# Patient Record
Sex: MALE | Race: BLACK OR AFRICAN AMERICAN | Employment: OTHER | URBAN - METROPOLITAN AREA
[De-identification: names, ages, dates, MRNs, and addresses within clinical notes are randomized per-mention and may not be internally consistent; named-entity substitution may affect disease eponyms.]

---

## 2017-05-26 ENCOUNTER — HOSPITAL ENCOUNTER (EMERGENCY)
Age: 67
Discharge: HOME | End: 2017-05-26
Attending: EMERGENCY MEDICINE
Payer: MEDICARE

## 2017-05-26 VITALS
TEMPERATURE: 97.9 F | SYSTOLIC BLOOD PRESSURE: 147 MMHG | DIASTOLIC BLOOD PRESSURE: 95 MMHG | RESPIRATION RATE: 18 BRPM | OXYGEN SATURATION: 98 % | HEART RATE: 81 BPM

## 2017-05-26 DIAGNOSIS — M25.511 PAIN OF BOTH SHOULDER JOINTS: Primary | ICD-10-CM

## 2017-05-26 DIAGNOSIS — F10.920 ALCOHOL INTOXICATION, UNCOMPLICATED (HCC): ICD-10-CM

## 2017-05-26 DIAGNOSIS — M25.512 PAIN OF BOTH SHOULDER JOINTS: Primary | ICD-10-CM

## 2017-05-26 LAB
ALBUMIN SERPL BCP-MCNC: 3.6 G/DL (ref 3.2–4.6)
ALBUMIN/GLOB SERPL: 0.9 {RATIO} (ref 1.2–3.5)
ALP SERPL-CCNC: 101 U/L (ref 50–136)
ALT SERPL-CCNC: 26 U/L (ref 12–65)
ANION GAP BLD CALC-SCNC: 9 MMOL/L (ref 7–16)
AST SERPL W P-5'-P-CCNC: 21 U/L (ref 15–37)
ATRIAL RATE: 87 BPM
BACTERIA URNS QL MICRO: 0 /HPF
BASOPHILS # BLD AUTO: 0 K/UL (ref 0–0.2)
BASOPHILS # BLD: 0 % (ref 0–2)
BILIRUB SERPL-MCNC: 0.6 MG/DL (ref 0.2–1.1)
BUN SERPL-MCNC: 15 MG/DL (ref 8–23)
CALCIUM SERPL-MCNC: 9 MG/DL (ref 8.3–10.4)
CALCULATED P AXIS, ECG09: 50 DEGREES
CALCULATED R AXIS, ECG10: 47 DEGREES
CALCULATED T AXIS, ECG11: 21 DEGREES
CASTS URNS QL MICRO: 0 /LPF
CHLORIDE SERPL-SCNC: 111 MMOL/L (ref 98–107)
CO2 SERPL-SCNC: 25 MMOL/L (ref 21–32)
CREAT SERPL-MCNC: 1.46 MG/DL (ref 0.8–1.5)
DIAGNOSIS, 93000: NORMAL
DIFFERENTIAL METHOD BLD: ABNORMAL
EOSINOPHIL # BLD: 0 K/UL (ref 0–0.8)
EOSINOPHIL NFR BLD: 1 % (ref 0.5–7.8)
EPI CELLS #/AREA URNS HPF: 0 /HPF
ERYTHROCYTE [DISTWIDTH] IN BLOOD BY AUTOMATED COUNT: 13.7 % (ref 11.9–14.6)
ETHANOL SERPL-MCNC: 263 MG/DL
GLOBULIN SER CALC-MCNC: 4 G/DL (ref 2.3–3.5)
GLUCOSE SERPL-MCNC: 107 MG/DL (ref 65–100)
HCT VFR BLD AUTO: 43.2 % (ref 41.1–50.3)
HGB BLD-MCNC: 15.7 G/DL (ref 13.6–17.2)
IMM GRANULOCYTES # BLD: 0 K/UL (ref 0–0.5)
IMM GRANULOCYTES NFR BLD AUTO: 0.2 % (ref 0–5)
LACTATE BLD-SCNC: 2 MMOL/L (ref 0.5–1.9)
LYMPHOCYTES # BLD AUTO: 43 % (ref 13–44)
LYMPHOCYTES # BLD: 2 K/UL (ref 0.5–4.6)
MCH RBC QN AUTO: 33.1 PG (ref 26.1–32.9)
MCHC RBC AUTO-ENTMCNC: 36.3 G/DL (ref 31.4–35)
MCV RBC AUTO: 91.1 FL (ref 79.6–97.8)
MONOCYTES # BLD: 0.3 K/UL (ref 0.1–1.3)
MONOCYTES NFR BLD AUTO: 6 % (ref 4–12)
NEUTS SEG # BLD: 2.3 K/UL (ref 1.7–8.2)
NEUTS SEG NFR BLD AUTO: 50 % (ref 43–78)
P-R INTERVAL, ECG05: 186 MS
PLATELET # BLD AUTO: 159 K/UL (ref 150–450)
PMV BLD AUTO: 10.3 FL (ref 10.8–14.1)
POTASSIUM SERPL-SCNC: 3.2 MMOL/L (ref 3.5–5.1)
PROT SERPL-MCNC: 7.6 G/DL (ref 6.3–8.2)
Q-T INTERVAL, ECG07: 356 MS
QRS DURATION, ECG06: 118 MS
QTC CALCULATION (BEZET), ECG08: 428 MS
RBC # BLD AUTO: 4.74 M/UL (ref 4.23–5.67)
RBC #/AREA URNS HPF: NORMAL /HPF
SODIUM SERPL-SCNC: 145 MMOL/L (ref 136–145)
TROPONIN I SERPL-MCNC: <0.02 NG/ML (ref 0.02–0.05)
VENTRICULAR RATE, ECG03: 87 BPM
WBC # BLD AUTO: 4.6 K/UL (ref 4.3–11.1)
WBC URNS QL MICRO: 0 /HPF

## 2017-05-26 PROCEDURE — 99285 EMERGENCY DEPT VISIT HI MDM: CPT | Performed by: EMERGENCY MEDICINE

## 2017-05-26 PROCEDURE — 81015 MICROSCOPIC EXAM OF URINE: CPT | Performed by: EMERGENCY MEDICINE

## 2017-05-26 PROCEDURE — 80053 COMPREHEN METABOLIC PANEL: CPT | Performed by: EMERGENCY MEDICINE

## 2017-05-26 PROCEDURE — 80307 DRUG TEST PRSMV CHEM ANLYZR: CPT | Performed by: EMERGENCY MEDICINE

## 2017-05-26 PROCEDURE — 84484 ASSAY OF TROPONIN QUANT: CPT | Performed by: EMERGENCY MEDICINE

## 2017-05-26 PROCEDURE — 81003 URINALYSIS AUTO W/O SCOPE: CPT | Performed by: EMERGENCY MEDICINE

## 2017-05-26 PROCEDURE — 93005 ELECTROCARDIOGRAM TRACING: CPT | Performed by: EMERGENCY MEDICINE

## 2017-05-26 PROCEDURE — 85025 COMPLETE CBC W/AUTO DIFF WBC: CPT | Performed by: EMERGENCY MEDICINE

## 2017-05-26 PROCEDURE — 83605 ASSAY OF LACTIC ACID: CPT

## 2017-05-26 RX ORDER — TRAMADOL HYDROCHLORIDE 50 MG/1
50 TABLET ORAL
Qty: 12 TAB | Refills: 0 | Status: SHIPPED | OUTPATIENT
Start: 2017-05-26 | End: 2018-11-16

## 2017-05-26 NOTE — ED NOTES
\"My shoulder has been hurting for a week but I was scared to come up here. 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 I quit hurting in this arm on this side and then the other one stared hurting\"

## 2017-05-26 NOTE — ED TRIAGE NOTES
PT arrived of ED c/o shoulder pain for the past week. Per EMS PT's girlfriend states that PT has been acting strange for the past 4 days. Pt admits to drinking ETOH. PT flows commands. PT is alert and oriented.

## 2017-05-26 NOTE — DISCHARGE INSTRUCTIONS
Acute Alcohol Intoxication: Care Instructions  Your Care Instructions  You have had treatment to help your body rid itself of alcohol. Too much alcohol upsets the body's fluid balance. Your doctor may have given you fluids and vitamins. For some people, drinking too much alcohol is a one-time event. For others, it is an ongoing problem. In either case, it is serious. It can be life-threatening. Follow-up care is a key part of your treatment and safety. Be sure to make and go to all appointments, and call your doctor if you are having problems. It's also a good idea to know your test results and keep a list of the medicines you take. How can you care for yourself at home? · Be safe with medicines. Take your medicines exactly as prescribed. Call your doctor if you think you are having a problem with your medicine. · Your doctor may have prescribed disulfiram (Antabuse). Do not drink any alcohol while you are taking this medicine. You may have severe or even life-threatening side effects from even small amounts of alcohol. · If you were given medicine to prevent nausea, be sure to take it exactly as prescribed. · Before you take any medicine, tell your doctor if:  ¨ You have had a bad reaction to any medicines in the past.  ¨ You are taking other medicines, including over-the-counter ones, or have other health problems. ¨ You are or could be pregnant. · Be prepared to have some symptoms of withdrawal in the next few days. · Drink plenty of liquids in the next few days. · Seek help if you need it to stop drinking. Getting counseling and joining a support group can help you stay sober. Try a support group such as Alcoholics Anonymous. · Avoid alcohol when you take medicines. It can react with many medicines and cause serious problems. When should you call for help? Call 911 anytime you think you may need emergency care.  For example, call if:  · You feel confused and are seeing things that are not there.  · You are thinking about killing yourself or hurting others. · You have a seizure. · You vomit blood or what looks like coffee grounds. Call your doctor now or seek immediate medical care if:  · You have trembling, restlessness, sweating, and other withdrawal symptoms that are new or that get worse. · Your withdrawal symptoms come back after not bothering you for days or weeks. · You can't stop vomiting. Watch closely for changes in your health, and be sure to contact your doctor if:  · You need help to stop drinking. Where can you learn more? Go to http://keonZila Networksgray.info/. Enter T102 in the search box to learn more about \"Acute Alcohol Intoxication: Care Instructions. \"  Current as of: November 3, 2016  Content Version: 11.2  © 1726-6461 [employer]. Care instructions adapted under license by [employer] (which disclaims liability or warranty for this information). If you have questions about a medical condition or this instruction, always ask your healthcare professional. [place] [address] any warranty or liability for your use of this information. Shoulder Pain: Care Instructions  Your Care Instructions    You can hurt your shoulder by using it too much during an activity, such as fishing or baseball. It can also happen as part of the everyday wear and tear of getting older. Shoulder injuries can be slow to heal, but your shoulder should get better with time. Your doctor may recommend a sling to rest your shoulder. If you have injured your shoulder, you may need testing and treatment. Follow-up care is a key part of your treatment and safety. Be sure to make and go to all appointments, and call your doctor if you are having problems. It's also a good idea to know your test results and keep a list of the medicines you take. How can you care for yourself at home? · Take pain medicines exactly as directed.   ¨ If the doctor gave you a prescription medicine for pain, take it as prescribed. ¨ If you are not taking a prescription pain medicine, ask your doctor if you can take an over-the-counter medicine. ¨ Do not take two or more pain medicines at the same time unless the doctor told you to. Many pain medicines contain acetaminophen, which is Tylenol. Too much acetaminophen (Tylenol) can be harmful. · If your doctor recommends that you wear a sling, use it as directed. Do not take it off before your doctor tells you to. · Put ice or a cold pack on the sore area for 10 to 20 minutes at a time. Put a thin cloth between the ice and your skin. · If there is no swelling, you can put moist heat, a heating pad, or a warm cloth on your shoulder. Some doctors suggest alternating between hot and cold. · Rest your shoulder for a few days. If your doctor recommends it, you can then begin gentle exercise of the shoulder, but do not lift anything heavy. When should you call for help? Call 911 anytime you think you may need emergency care. For example, call if:  · You have chest pain or pressure. This may occur with:  ¨ Sweating. ¨ Shortness of breath. ¨ Nausea or vomiting. ¨ Pain that spreads from the chest to the neck, jaw, or one or both shoulders or arms. ¨ Dizziness or lightheadedness. ¨ A fast or uneven pulse. After calling 911, chew 1 adult-strength aspirin. Wait for an ambulance. Do not try to drive yourself. · Your arm or hand is cool or pale or changes color. Call your doctor now or seek immediate medical care if:  · You have signs of infection, such as:  ¨ Increased pain, swelling, warmth, or redness in your shoulder. ¨ Red streaks leading from a place on your shoulder. ¨ Pus draining from an area of your shoulder. ¨ Swollen lymph nodes in your neck, armpits, or groin. ¨ A fever. Watch closely for changes in your health, and be sure to contact your doctor if:  · You cannot use your shoulder.   · Your shoulder does not get better as expected. Where can you learn more? Go to http://keon-gray.info/. Enter U025 in the search box to learn more about \"Shoulder Pain: Care Instructions. \"  Current as of: May 23, 2016  Content Version: 11.2  © 3334-7733 [employer]. Care instructions adapted under license by [employer] (which disclaims liability or warranty for this information). If you have questions about a medical condition or this instruction, always ask your healthcare professional. [address] any warranty or liability for your use of this information.

## 2017-05-26 NOTE — ED TRIAGE NOTES
PT arrived of ED c/o shoulder pain for the past week. Per EMS PT's girlfriend states that PT has been acting strange for the past 4 days. Pt admits to drinking ETOH. PT flows commands. PT is alert and oriented. PT has multiple bottles of the same medications.

## 2017-05-26 NOTE — ED PROVIDER NOTES
HPI Comments: Patient presents with bilateral shoulder pain and some question of chest pain over the last 3-4 days. Patient is a 73 y.o. male presenting with shoulder pain. The history is provided by the patient and the spouse. Shoulder Pain The incident occurred more than 2 days ago. There was no injury mechanism. Both shoulders are affected. The patient is experiencing no pain. The pain has been intermittent since onset. The pain does not radiate. There is no history of shoulder injury. He has no other injuries. There is no history of shoulder surgery. Pertinent negatives include no numbness, no muscle weakness and no tingling. He reports no foreign bodies present. Past Medical History:  
Diagnosis Date  Anxiety  Dermatophytosis of nail  Diabetes ([facility] [address] 9/12/2014  Diabetic polyneuropathy ([facility] [address]  Heart murmur AORTIC SYSTOLIC FLOW MURMUR, [address] [place]  
 Hepatic encephalopathy ([facility] [address] 5/5/2014  Hyperlipidemia  Hypertension  Metatarsal stress fracture  Metatarsalgia  Osteoarthritis  Peripheral edema  Psychiatric disorder   
 depression  UTI (urinary tract infection) Past Surgical History:  
Procedure Laterality Date  HX HERNIA REPAIR  2013  HX LAPAROTOMY  2012 ABDOMINAL SURGERY   
 HX OTHER SURGICAL    
 cyst removed from back x 1 week  NEUROLOGICAL PROCEDURE UNLISTED    
 plate in  head. Gevena Elaine Family History:  
Problem Relation Age of Onset  Colon Cancer Mother  Stroke Father  Hypertension Sister Social History Social History  Marital status:  Spouse name: N/A  
 Number of children: N/A  
 Years of education: N/A Occupational History  Not on file. Social History Main Topics  Smoking status: Current Every Day Smoker  Smokeless tobacco: Never Used Comment: LIGHT TOBACCO SMOKER 1-2 A DAY  Alcohol use 0.0 oz/week Comment: large unknown amount daily  Drug use: No  
 Sexual activity: Not Currently Other Topics Concern  Not on file Social History Narrative ALLERGIES: Pcn [penicillins] Review of Systems Constitutional: Negative for chills and fever. Neurological: Negative for tingling and numbness. All other systems reviewed and are negative. Vitals:  
 05/26/17 0449 BP: (!) 174/108 Pulse: 86 Resp: 18 Temp: 97.9 °F (36.6 °C) Physical Exam  
Constitutional: He is oriented to person, place, and time. He appears well-developed and well-nourished. No distress. HENT:  
Head: Normocephalic and atraumatic. Right Ear: Tympanic membrane and external ear normal.  
Left Ear: Tympanic membrane and external ear normal.  
Mouth/Throat: Oropharynx is clear and moist.  
Eyes: Conjunctivae and EOM are normal. Pupils are equal, round, and reactive to light. Neck: Normal range of motion. Neck supple. No tracheal deviation present. Cardiovascular: Normal rate, regular rhythm, normal heart sounds and intact distal pulses. Exam reveals no gallop and no friction rub. No murmur heard. Pulmonary/Chest: Effort normal and breath sounds normal. No respiratory distress. He has no wheezes. Abdominal: Soft. Bowel sounds are normal. He exhibits no distension and no mass. There is no hepatosplenomegaly. There is no tenderness. There is no rebound and no guarding. Musculoskeletal: Normal range of motion. He exhibits no edema. Patient's shoulders are non tender and he supports himself without difficulty with either one Lymphadenopathy:  
  He has no cervical adenopathy. Neurological: He is alert and oriented to person, place, and time. He has normal strength. No cranial nerve deficit or sensory deficit. Coordination abnormal.  
Skin: Skin is warm and dry. No rash noted. He is not diaphoretic. No erythema. Psychiatric: He has a normal mood and affect. His speech is slurred. He is slowed.  Cognition and memory are normal.  
Nursing note and vitals reviewed. MDM Number of Diagnoses or Management Options Alcohol intoxication, uncomplicated ([facility] [address]): new and requires workup Pain of both shoulder joints: new and requires workup Amount and/or Complexity of Data Reviewed Clinical lab tests: ordered and reviewed Tests in the radiology section of CPT®: reviewed and ordered Decide to obtain previous medical records or to obtain history from someone other than the patient: yes Review and summarize past medical records: yes Independent visualization of images, tracings, or specimens: yes Risk of Complications, Morbidity, and/or Mortality Presenting problems: high Diagnostic procedures: minimal 
Management options: moderate Patient Progress Patient progress: stable ED Course Procedures The patient was observed in the ED. Results Reviewed: 
 
 
Recent Results (from the past 24 hour(s)) EKG, 12 LEAD, INITIAL Collection Time: 05/26/17  4:50 AM  
Result Value Ref Range Ventricular Rate 87 BPM  
 Atrial Rate 87 BPM  
 P-R Interval 186 ms QRS Duration 118 ms Q-T Interval 356 ms  
 QTC Calculation (Bezet) 428 ms Calculated P Axis 50 degrees Calculated R Axis 47 degrees Calculated T Axis 21 degrees Diagnosis    
  !! AGE AND GENDER SPECIFIC ECG ANALYSIS !! Normal sinus rhythm Right bundle branch block Abnormal ECG When compared with ECG of 08-JUL-2015 00:58, Right bundle branch block is now Present CBC WITH AUTOMATED DIFF Collection Time: 05/26/17  5:06 AM  
Result Value Ref Range WBC 4.6 4.3 - 11.1 K/uL  
 RBC 4.74 4.23 - 5.67 M/uL  
 HGB 15.7 13.6 - 17.2 g/dL HCT 43.2 41.1 - 50.3 % MCV 91.1 79.6 - 97.8 FL  
 MCH 33.1 (H) 26.1 - 32.9 PG  
 MCHC 36.3 (H) 31.4 - 35.0 g/dL  
 RDW 13.7 11.9 - 14.6 % PLATELET 752 422 - 168 K/uL MPV 10.3 (L) 10.8 - 14.1 FL  
 DF AUTOMATED NEUTROPHILS 50 43 - 78 % LYMPHOCYTES 43 13 - 44 % MONOCYTES 6 4.0 - 12.0 %  EOSINOPHILS 1 0.5 - 7.8 % BASOPHILS 0 0.0 - 2.0 % IMMATURE GRANULOCYTES 0.2 0.0 - 5.0 %  
 ABS. NEUTROPHILS 2.3 1.7 - 8.2 K/UL  
 ABS. LYMPHOCYTES 2.0 0.5 - 4.6 K/UL  
 ABS. MONOCYTES 0.3 0.1 - 1.3 K/UL  
 ABS. EOSINOPHILS 0.0 0.0 - 0.8 K/UL  
 ABS. BASOPHILS 0.0 0.0 - 0.2 K/UL  
 ABS. IMM. GRANS. 0.0 0.0 - 0.5 K/UL METABOLIC PANEL, COMPREHENSIVE Collection Time: 05/26/17  5:06 AM  
Result Value Ref Range Sodium 145 136 - 145 mmol/L Potassium 3.2 (L) 3.5 - 5.1 mmol/L Chloride 111 (H) 98 - 107 mmol/L  
 CO2 25 21 - 32 mmol/L Anion gap 9 7 - 16 mmol/L Glucose 107 (H) 65 - 100 mg/dL BUN 15 8 - 23 MG/DL Creatinine 1.46 0.8 - 1.5 MG/DL  
 GFR est AA >60 >60 ml/min/1.73m2 GFR est non-AA 52 (L) >60 ml/min/1.73m2 Calcium 9.0 8.3 - 10.4 MG/DL Bilirubin, total 0.6 0.2 - 1.1 MG/DL  
 ALT (SGPT) 26 12 - 65 U/L  
 AST (SGOT) 21 15 - 37 U/L Alk. phosphatase 101 50 - 136 U/L Protein, total 7.6 6.3 - 8.2 g/dL Albumin 3.6 3.2 - 4.6 g/dL Globulin 4.0 (H) 2.3 - 3.5 g/dL A-G Ratio 0.9 (L) 1.2 - 3.5    
TROPONIN I Collection Time: 05/26/17  5:06 AM  
Result Value Ref Range Troponin-I, Qt. <0.02 (L) 0.02 - 0.05 NG/ML  
ETHYL ALCOHOL Collection Time: 05/26/17  5:06 AM  
Result Value Ref Range ALCOHOL(ETHYL),SERUM 263 MG/DL POC LACTIC ACID Collection Time: 05/26/17  5:09 AM  
Result Value Ref Range Lactic Acid (POC) 2.0 (H) 0.5 - 1.9 mmol/L  
URINE MICROSCOPIC Collection Time: 05/26/17  5:30 AM  
Result Value Ref Range WBC 0 0 /hpf  
 RBC 0-3 0 /hpf Epithelial cells 0 0 /hpf Bacteria 0 0 /hpf Casts 0 0 /lpf No orders to display I discussed the results of all labs, procedures, radiographs, and treatments with the patient and available family. Treatment plan is agreed upon and the patient is ready for discharge. All voiced understanding of the discharge plan and medication instructions or changes as appropriate.   Questions about treatment in the ED were answered. All were encouraged to return should symptoms worsen or new problems develop.

## 2017-06-30 ENCOUNTER — HOSPITAL ENCOUNTER (EMERGENCY)
Age: 67
Discharge: HOME | End: 2017-06-30
Attending: EMERGENCY MEDICINE
Payer: MEDICARE

## 2017-06-30 ENCOUNTER — APPOINTMENT (OUTPATIENT)
Dept: GENERAL RADIOLOGY | Age: 67
End: 2017-06-30
Attending: EMERGENCY MEDICINE
Payer: MEDICARE

## 2017-06-30 VITALS
WEIGHT: 180 LBS | DIASTOLIC BLOOD PRESSURE: 94 MMHG | BODY MASS INDEX: 29.99 KG/M2 | RESPIRATION RATE: 16 BRPM | OXYGEN SATURATION: 97 % | HEART RATE: 63 BPM | SYSTOLIC BLOOD PRESSURE: 170 MMHG | HEIGHT: 65 IN | TEMPERATURE: 98.4 F

## 2017-06-30 DIAGNOSIS — I10 ESSENTIAL HYPERTENSION: Primary | ICD-10-CM

## 2017-06-30 DIAGNOSIS — K59.00 CONSTIPATION, UNSPECIFIED CONSTIPATION TYPE: ICD-10-CM

## 2017-06-30 LAB
ALBUMIN SERPL BCP-MCNC: 4 G/DL (ref 3.2–4.6)
ALBUMIN/GLOB SERPL: 1.1 {RATIO} (ref 1.2–3.5)
ALP SERPL-CCNC: 98 U/L (ref 50–136)
ALT SERPL-CCNC: 27 U/L (ref 12–65)
ANION GAP BLD CALC-SCNC: 10 MMOL/L (ref 7–16)
AST SERPL W P-5'-P-CCNC: 23 U/L (ref 15–37)
ATRIAL RATE: 61 BPM
BASOPHILS # BLD AUTO: 0 K/UL (ref 0–0.2)
BASOPHILS # BLD: 0 % (ref 0–2)
BILIRUB SERPL-MCNC: 1.6 MG/DL (ref 0.2–1.1)
BUN SERPL-MCNC: 9 MG/DL (ref 8–23)
CALCIUM SERPL-MCNC: 8.7 MG/DL (ref 8.3–10.4)
CALCULATED P AXIS, ECG09: 59 DEGREES
CALCULATED R AXIS, ECG10: 31 DEGREES
CALCULATED T AXIS, ECG11: 22 DEGREES
CHLORIDE SERPL-SCNC: 105 MMOL/L (ref 98–107)
CO2 SERPL-SCNC: 25 MMOL/L (ref 21–32)
CREAT SERPL-MCNC: 1.44 MG/DL (ref 0.8–1.5)
DIAGNOSIS, 93000: NORMAL
DIFFERENTIAL METHOD BLD: ABNORMAL
EOSINOPHIL # BLD: 0.1 K/UL (ref 0–0.8)
EOSINOPHIL NFR BLD: 1 % (ref 0.5–7.8)
ERYTHROCYTE [DISTWIDTH] IN BLOOD BY AUTOMATED COUNT: 13.7 % (ref 11.9–14.6)
ETHANOL SERPL-MCNC: <3 MG/DL
GLOBULIN SER CALC-MCNC: 3.8 G/DL (ref 2.3–3.5)
GLUCOSE SERPL-MCNC: 92 MG/DL (ref 65–100)
HCT VFR BLD AUTO: 45.3 % (ref 41.1–50.3)
HGB BLD-MCNC: 16.7 G/DL (ref 13.6–17.2)
IMM GRANULOCYTES # BLD: 0 K/UL (ref 0–0.5)
IMM GRANULOCYTES NFR BLD AUTO: 0.2 % (ref 0–5)
LYMPHOCYTES # BLD AUTO: 23 % (ref 13–44)
LYMPHOCYTES # BLD: 1.4 K/UL (ref 0.5–4.6)
MCH RBC QN AUTO: 33.1 PG (ref 26.1–32.9)
MCHC RBC AUTO-ENTMCNC: 36.9 G/DL (ref 31.4–35)
MCV RBC AUTO: 89.7 FL (ref 79.6–97.8)
MONOCYTES # BLD: 0.5 K/UL (ref 0.1–1.3)
MONOCYTES NFR BLD AUTO: 8 % (ref 4–12)
NEUTS SEG # BLD: 4.1 K/UL (ref 1.7–8.2)
NEUTS SEG NFR BLD AUTO: 68 % (ref 43–78)
P-R INTERVAL, ECG05: 162 MS
PLATELET # BLD AUTO: 145 K/UL (ref 150–450)
PMV BLD AUTO: 10.7 FL (ref 10.8–14.1)
POTASSIUM SERPL-SCNC: 3.4 MMOL/L (ref 3.5–5.1)
PROT SERPL-MCNC: 7.8 G/DL (ref 6.3–8.2)
Q-T INTERVAL, ECG07: 406 MS
QRS DURATION, ECG06: 118 MS
QTC CALCULATION (BEZET), ECG08: 408 MS
RBC # BLD AUTO: 5.05 M/UL (ref 4.23–5.67)
SODIUM SERPL-SCNC: 140 MMOL/L (ref 136–145)
VENTRICULAR RATE, ECG03: 61 BPM
WBC # BLD AUTO: 6.1 K/UL (ref 4.3–11.1)

## 2017-06-30 PROCEDURE — 80053 COMPREHEN METABOLIC PANEL: CPT

## 2017-06-30 PROCEDURE — 74022 RADEX COMPL AQT ABD SERIES: CPT

## 2017-06-30 PROCEDURE — 74011250637 HC RX REV CODE- 250/637: Performed by: EMERGENCY MEDICINE

## 2017-06-30 PROCEDURE — 81003 URINALYSIS AUTO W/O SCOPE: CPT | Performed by: EMERGENCY MEDICINE

## 2017-06-30 PROCEDURE — 99285 EMERGENCY DEPT VISIT HI MDM: CPT | Performed by: EMERGENCY MEDICINE

## 2017-06-30 PROCEDURE — 93005 ELECTROCARDIOGRAM TRACING: CPT | Performed by: EMERGENCY MEDICINE

## 2017-06-30 PROCEDURE — 80307 DRUG TEST PRSMV CHEM ANLYZR: CPT | Performed by: EMERGENCY MEDICINE

## 2017-06-30 PROCEDURE — 85025 COMPLETE CBC W/AUTO DIFF WBC: CPT

## 2017-06-30 RX ORDER — SODIUM CHLORIDE 0.9 % (FLUSH) 0.9 %
5-10 SYRINGE (ML) INJECTION AS NEEDED
Status: DISCONTINUED | OUTPATIENT
Start: 2017-06-30 | End: 2017-06-30 | Stop reason: HOSPADM

## 2017-06-30 RX ORDER — CLONIDINE HYDROCHLORIDE 0.1 MG/1
0.1 TABLET ORAL
Status: COMPLETED | OUTPATIENT
Start: 2017-06-30 | End: 2017-06-30

## 2017-06-30 RX ORDER — DOCUSATE SODIUM 100 MG/1
100 CAPSULE, LIQUID FILLED ORAL 2 TIMES DAILY
Qty: 20 CAP | Refills: 2 | Status: SHIPPED | OUTPATIENT
Start: 2017-06-30 | End: 2017-07-10

## 2017-06-30 RX ORDER — POLYETHYLENE GLYCOL 3350 17 G/17G
17 POWDER, FOR SOLUTION ORAL DAILY
Qty: 238 G | Refills: 0 | Status: SHIPPED | OUTPATIENT
Start: 2017-06-30 | End: 2019-01-21 | Stop reason: SDUPTHER

## 2017-06-30 RX ORDER — SODIUM CHLORIDE 0.9 % (FLUSH) 0.9 %
5-10 SYRINGE (ML) INJECTION EVERY 8 HOURS
Status: DISCONTINUED | OUTPATIENT
Start: 2017-06-30 | End: 2017-06-30 | Stop reason: HOSPADM

## 2017-06-30 RX ADMIN — CLONIDINE HYDROCHLORIDE 0.1 MG: 0.1 TABLET ORAL at 01:54

## 2017-06-30 NOTE — ED PROVIDER NOTES
Patient is a 73 y.o. male presenting with abdominal pain. The history is provided by the patient. Abdominal Pain This is a recurrent problem. The current episode started 12 to 24 hours ago. The problem occurs constantly. The problem has not changed since onset. The pain is associated with an unknown factor. The pain is located in the generalized abdominal region. The quality of the pain is aching and cramping. The pain is moderate. Associated symptoms include nausea, constipation, headaches and arthralgias. Pertinent negatives include no fever, no diarrhea, no vomiting, no dysuria, no frequency, no hematuria, no myalgias, no chest pain and no back pain. The pain is worsened by activity. The pain is relieved by nothing. Past workup includes no CT scan, no ultrasound. His past medical history is significant for PUD. His past medical history does not include DM. perforated duodenal ulcer repair Past Medical History:  
Diagnosis Date  Anxiety  Dermatophytosis of nail  Diabetes ([facility] [address] 9/12/2014  Diabetic polyneuropathy ([facility] [address]  Heart murmur AORTIC SYSTOLIC FLOW MURMUR, [address] [place]  
 Hepatic encephalopathy ([address] 5/5/2014  Hyperlipidemia  Hypertension  Metatarsal stress fracture  Metatarsalgia  Osteoarthritis  Peripheral edema  Psychiatric disorder   
 depression  UTI (urinary tract infection) Past Surgical History:  
Procedure Laterality Date  HX HERNIA REPAIR  2013  HX LAPAROTOMY  2012 ABDOMINAL SURGERY   
 HX OTHER SURGICAL    
 cyst removed from back x 1 week  NEUROLOGICAL PROCEDURE UNLISTED    
 plate in  head. Chet Jennings Family History:  
Problem Relation Age of Onset  Colon Cancer Mother  Stroke Father  Hypertension Sister Social History Social History  Marital status:  Spouse name: N/A  
 Number of children: N/A  
 Years of education: N/A Occupational History  Not on file.   
 
Social History Main Topics  Smoking status: Current Every Day Smoker  Smokeless tobacco: Never Used Comment: LIGHT TOBACCO SMOKER 1-2 A DAY  Alcohol use 0.0 oz/week Comment: large unknown amount daily  Drug use: No  
 Sexual activity: Not Currently Other Topics Concern  Not on file Social History Narrative ALLERGIES: Pcn [penicillins] Review of Systems Constitutional: Negative for activity change, chills, diaphoresis and fever. HENT: Negative for dental problem, hearing loss, nosebleeds, rhinorrhea and sore throat. Eyes: Negative for pain, discharge, redness and visual disturbance. Respiratory: Negative for cough, chest tightness and shortness of breath. Cardiovascular: Negative for chest pain, palpitations and leg swelling. Gastrointestinal: Positive for abdominal pain, constipation and nausea. Negative for diarrhea and vomiting. Endocrine: Negative for cold intolerance, heat intolerance, polydipsia and polyuria. Genitourinary: Negative for dysuria, flank pain, frequency and hematuria. Musculoskeletal: Positive for arthralgias. Negative for back pain, joint swelling, myalgias and neck pain. Skin: Negative for pallor and rash. Allergic/Immunologic: Negative for environmental allergies and food allergies. Neurological: Positive for headaches. Negative for dizziness, tremors, light-headedness and numbness. Hematological: Negative for adenopathy. Does not bruise/bleed easily. Psychiatric/Behavioral: Negative for confusion and dysphoric mood. The patient is not nervous/anxious and is not hyperactive. All other systems reviewed and are negative. Vitals:  
 06/30/17 0040 BP: (!) 172/111 Pulse: 80 Resp: 20 Temp: 98.4 °F (36.9 °C) SpO2: 97% Weight: 81.6 kg (180 lb) Height: 5' 5\" (1.651 m) Physical Exam  
Constitutional: He is oriented to person, place, and time. He appears well-developed and well-nourished.  He appears distressed. HENT:  
Head: Normocephalic and atraumatic. Mouth/Throat: Oropharynx is clear and moist.  
Eyes: Conjunctivae and EOM are normal. Pupils are equal, round, and reactive to light. Right eye exhibits no discharge. Left eye exhibits no discharge. No scleral icterus. Neck: Normal range of motion. Neck supple. No JVD present. Cardiovascular: Normal rate, regular rhythm, normal heart sounds and intact distal pulses. Exam reveals no gallop and no friction rub. No murmur heard. Pulmonary/Chest: Effort normal and breath sounds normal. No respiratory distress. He has no wheezes. Abdominal: Soft. Bowel sounds are normal. He exhibits no distension. There is no hepatosplenomegaly. There is generalized tenderness. There is no rigidity, no rebound and no guarding. Musculoskeletal: Normal range of motion. He exhibits no edema or tenderness. Lymphadenopathy:  
  He has no cervical adenopathy. Neurological: He is alert and oriented to person, place, and time. He has normal strength. No cranial nerve deficit or sensory deficit. He exhibits normal muscle tone. GCS eye subscore is 4. GCS verbal subscore is 5. GCS motor subscore is 6. Skin: Skin is warm and dry. No rash noted. He is not diaphoretic. No erythema. Psychiatric: His speech is normal and behavior is normal. Judgment and thought content normal. His mood appears anxious. Cognition and memory are normal.  
Nursing note and vitals reviewed. MDM Number of Diagnoses or Management Options Constipation, unspecified constipation type: new and requires workup Essential hypertension: established and worsening Diagnosis management comments: Urinalysis is negative. EKG reviewed Sinus rhythm, normal intervals, no axis, no ectopy, no acute ischemic changes 
 
acute abdominal series revealed a nonspecific bowel gas pattern with subtle constipation. There is no free air.   I see no air fluid levels that would indicate ileus or obstruction. Amount and/or Complexity of Data Reviewed Clinical lab tests: ordered and reviewed Tests in the radiology section of CPT®: ordered and reviewed Tests in the medicine section of CPT®: ordered and reviewed Review and summarize past medical records: yes Risk of Complications, Morbidity, and/or Mortality Presenting problems: moderate Diagnostic procedures: moderate Management options: moderate General comments: Elements of this note have been dictated via voice recognition software. Text and phrases may be limited by the accuracy of the software. The chart has been reviewed, but errors may still be present. Patient Progress Patient progress: improved ED Course Procedures

## 2017-06-30 NOTE — DISCHARGE INSTRUCTIONS
Constipation: Care Instructions  Your Care Instructions  Constipation means that you have a hard time passing stools (bowel movements). People pass stools from 3 times a day to once every 3 days. What is normal for you may be different. Constipation may occur with pain in the rectum and cramping. The pain may get worse when you try to pass stools. Sometimes there are small amounts of bright red blood on toilet paper or the surface of stools. This is because of enlarged veins near the rectum (hemorrhoids). A few changes in your diet and lifestyle may help you avoid ongoing constipation. Your doctor may also prescribe medicine to help loosen your stool. Some medicines can cause constipation. These include pain medicines and antidepressants. Tell your doctor about all the medicines you take. Your doctor may want to make a medicine change to ease your symptoms. Follow-up care is a key part of your treatment and safety. Be sure to make and go to all appointments, and call your doctor if you are having problems. It's also a good idea to know your test results and keep a list of the medicines you take. How can you care for yourself at home? · Drink plenty of fluids, enough so that your urine is light yellow or clear like water. If you have kidney, heart, or liver disease and have to limit fluids, talk with your doctor before you increase the amount of fluids you drink. · Include high-fiber foods in your diet each day. These include fruits, vegetables, beans, and whole grains. · Get at least 30 minutes of exercise on most days of the week. Walking is a good choice. You also may want to do other activities, such as running, swimming, cycling, or playing tennis or team sports. · Take a fiber supplement, such as Citrucel or Metamucil, every day. Read and follow all instructions on the label. · Schedule time each day for a bowel movement. A daily routine may help.  Take your time having your bowel movement. · Support your feet with a small step stool when you sit on the toilet. This helps flex your hips and places your pelvis in a squatting position. · Your doctor may recommend an over-the-counter laxative to relieve your constipation. Examples are Milk of Magnesia and MiraLax. Read and follow all instructions on the label. Do not use laxatives on a long-term basis. When should you call for help? Call your doctor now or seek immediate medical care if:  · You have new or worse belly pain. · You have new or worse nausea or vomiting. · You have blood in your stools. Watch closely for changes in your health, and be sure to contact your doctor if:  · Your constipation is getting worse. · You do not get better as expected. Where can you learn more? Go to http://keon-gray.info/. Enter 21 [phone] in the search box to learn more about \"Constipation: Care Instructions. \"  Current as of: March 20, 2017  Content Version: 11.3  © 1844-2652 [employer]. Care instructions adapted under license by [employer] (which disclaims liability or warranty for this information). If you have questions about a medical condition or this instruction, always ask your healthcare professional. [address] any warranty or liability for your use of this information. High Blood Pressure: Care Instructions  Your Care Instructions  If your blood pressure is usually above 140/90, you have high blood pressure, or hypertension. That means the top number is 140 or higher or the bottom number is 90 or higher, or both. Despite what a lot of people think, high blood pressure usually doesn't cause headaches or make you feel dizzy or lightheaded. It usually has no symptoms. But it does increase your risk for heart attack, stroke, and kidney or eye damage. The higher your blood pressure, the more your risk increases. Your doctor will give you a goal for your blood pressure. Your goal will be based on your health and your age. An example of a goal is to keep your blood pressure below 140/90. Lifestyle changes, such as eating healthy and being active, are always important to help lower blood pressure. You might also take medicine to reach your blood pressure goal.  Follow-up care is a key part of your treatment and safety. Be sure to make and go to all appointments, and call your doctor if you are having problems. It's also a good idea to know your test results and keep a list of the medicines you take. How can you care for yourself at home? Medical treatment  · If you stop taking your medicine, your blood pressure will go back up. You may take one or more types of medicine to lower your blood pressure. Be safe with medicines. Take your medicine exactly as prescribed. Call your doctor if you think you are having a problem with your medicine. · Talk to your doctor before you start taking aspirin every day. Aspirin can help certain people lower their risk of a heart attack or stroke. But taking aspirin isn't right for everyone, because it can cause serious bleeding. · See your doctor regularly. You may need to see the doctor more often at first or until your blood pressure comes down. · If you are taking blood pressure medicine, talk to your doctor before you take decongestants or anti-inflammatory medicine, such as ibuprofen. Some of these medicines can raise blood pressure. · Learn how to check your blood pressure at home. Lifestyle changes  · Stay at a healthy weight. This is especially important if you put on weight around the waist. Losing even 10 pounds can help you lower your blood pressure. · If your doctor recommends it, get more exercise. Walking is a good choice. Bit by bit, increase the amount you walk every day. Try for at least 30 minutes on most days of the week. You also may want to swim, bike, or do other activities. · Avoid or limit alcohol.  Talk to your doctor about whether you can drink any alcohol. · Try to limit how much sodium you eat to less than 2,300 milligrams (mg) a day. Your doctor may ask you to try to eat less than 1,500 mg a day. · Eat plenty of fruits (such as bananas and oranges), vegetables, legumes, whole grains, and low-fat dairy products. · Lower the amount of saturated fat in your diet. Saturated fat is found in animal products such as milk, cheese, and meat. Limiting these foods may help you lose weight and also lower your risk for heart disease. · Do not smoke. Smoking increases your risk for heart attack and stroke. If you need help quitting, talk to your doctor about stop-smoking programs and medicines. These can increase your chances of quitting for good. When should you call for help? Call 911 anytime you think you may need emergency care. This may mean having symptoms that suggest that your blood pressure is causing a serious heart or blood vessel problem. Your blood pressure may be over 180/110. For example, call 911 if:  · You have symptoms of a heart attack. These may include:  ¨ Chest pain or pressure, or a strange feeling in the chest.  ¨ Sweating. ¨ Shortness of breath. ¨ Nausea or vomiting. ¨ Pain, pressure, or a strange feeling in the back, neck, jaw, or upper belly or in one or both shoulders or arms. ¨ Lightheadedness or sudden weakness. ¨ A fast or irregular heartbeat. · You have symptoms of a stroke. These may include:  ¨ Sudden numbness, tingling, weakness, or loss of movement in your face, arm, or leg, especially on only one side of your body. ¨ Sudden vision changes. ¨ Sudden trouble speaking. ¨ Sudden confusion or trouble understanding simple statements. ¨ Sudden problems with walking or balance. ¨ A sudden, severe headache that is different from past headaches. · You have severe back or belly pain. Do not wait until your blood pressure comes down on its own. Get help right away.   Call your doctor now or seek immediate care if:  · Your blood pressure is much higher than normal (such as 180/110 or higher), but you don't have symptoms. · You think high blood pressure is causing symptoms, such as:  ¨ Severe headache. ¨ Blurry vision. Watch closely for changes in your health, and be sure to contact your doctor if:  · Your blood pressure measures 140/90 or higher at least 2 times. That means the top number is 140 or higher or the bottom number is 90 or higher, or both. · You think you may be having side effects from your blood pressure medicine. · Your blood pressure is usually normal, but it goes above normal at least 2 times. Where can you learn more? Go to http://keon-gray.info/. Enter O394 in the search box to learn more about \"High Blood Pressure: Care Instructions. \"  Current as of: August 8, 2016  Content Version: 11.3  © 7377-4841 [employer]. Care instructions adapted under license by [employer] (which disclaims liability or warranty for this information). If you have questions about a medical condition or this instruction, always ask your healthcare professional. [place] [address] any warranty or liability for your use of this information.

## 2017-06-30 NOTE — ED TRIAGE NOTES
Constipation x 1 day with abdominal pain. Also having a headache. Pt states headache is from fall 3 - 4 months ago. Denies nausea, vomiting or diarrhea.

## 2018-07-12 ENCOUNTER — HOSPITAL ENCOUNTER (EMERGENCY)
Age: 68
Discharge: HOME | End: 2018-07-12
Attending: STUDENT IN AN ORGANIZED HEALTH CARE EDUCATION/TRAINING PROGRAM
Payer: MEDICARE

## 2018-07-12 ENCOUNTER — APPOINTMENT (OUTPATIENT)
Dept: GENERAL RADIOLOGY | Age: 68
End: 2018-07-12
Attending: STUDENT IN AN ORGANIZED HEALTH CARE EDUCATION/TRAINING PROGRAM
Payer: MEDICARE

## 2018-07-12 VITALS
SYSTOLIC BLOOD PRESSURE: 129 MMHG | TEMPERATURE: 98.5 F | HEART RATE: 108 BPM | DIASTOLIC BLOOD PRESSURE: 82 MMHG | RESPIRATION RATE: 18 BRPM | OXYGEN SATURATION: 91 %

## 2018-07-12 DIAGNOSIS — F10.10 ALCOHOL ABUSE: Primary | ICD-10-CM

## 2018-07-12 LAB
ALBUMIN SERPL-MCNC: 3.9 G/DL (ref 3.2–4.6)
ALBUMIN/GLOB SERPL: 1 {RATIO} (ref 1.2–3.5)
ALP SERPL-CCNC: 96 U/L (ref 50–136)
ALT SERPL-CCNC: 27 U/L (ref 12–65)
ANION GAP SERPL CALC-SCNC: 12 MMOL/L (ref 7–16)
AST SERPL-CCNC: 22 U/L (ref 15–37)
ATRIAL RATE: 93 BPM
BASOPHILS # BLD: 0.1 K/UL (ref 0–0.2)
BASOPHILS NFR BLD: 1 % (ref 0–2)
BILIRUB SERPL-MCNC: 0.5 MG/DL (ref 0.2–1.1)
BUN SERPL-MCNC: 21 MG/DL (ref 8–23)
CALCIUM SERPL-MCNC: 9.3 MG/DL (ref 8.3–10.4)
CALCULATED P AXIS, ECG09: 64 DEGREES
CALCULATED R AXIS, ECG10: 40 DEGREES
CALCULATED T AXIS, ECG11: 34 DEGREES
CHLORIDE SERPL-SCNC: 108 MMOL/L (ref 98–107)
CO2 SERPL-SCNC: 24 MMOL/L (ref 21–32)
CREAT SERPL-MCNC: 2.18 MG/DL (ref 0.8–1.5)
DIAGNOSIS, 93000: NORMAL
DIFFERENTIAL METHOD BLD: ABNORMAL
EOSINOPHIL # BLD: 0.1 K/UL (ref 0–0.8)
EOSINOPHIL NFR BLD: 1 % (ref 0.5–7.8)
ERYTHROCYTE [DISTWIDTH] IN BLOOD BY AUTOMATED COUNT: 13.8 % (ref 11.9–14.6)
ETHANOL SERPL-MCNC: 280 MG/DL
GLOBULIN SER CALC-MCNC: 3.8 G/DL (ref 2.3–3.5)
GLUCOSE SERPL-MCNC: 105 MG/DL (ref 65–100)
HCT VFR BLD AUTO: 44.8 % (ref 41.1–50.3)
HGB BLD-MCNC: 15.9 G/DL (ref 13.6–17.2)
IMM GRANULOCYTES # BLD: 0 K/UL (ref 0–0.5)
IMM GRANULOCYTES NFR BLD AUTO: 0 % (ref 0–5)
LYMPHOCYTES # BLD: 1.7 K/UL (ref 0.5–4.6)
LYMPHOCYTES NFR BLD: 34 % (ref 13–44)
MAGNESIUM SERPL-MCNC: 2 MG/DL (ref 1.8–2.4)
MCH RBC QN AUTO: 32.5 PG (ref 26.1–32.9)
MCHC RBC AUTO-ENTMCNC: 35.5 G/DL (ref 31.4–35)
MCV RBC AUTO: 91.6 FL (ref 79.6–97.8)
MONOCYTES # BLD: 0.3 K/UL (ref 0.1–1.3)
MONOCYTES NFR BLD: 7 % (ref 4–12)
NEUTS SEG # BLD: 2.8 K/UL (ref 1.7–8.2)
NEUTS SEG NFR BLD: 57 % (ref 43–78)
P-R INTERVAL, ECG05: 136 MS
PLATELET # BLD AUTO: 148 K/UL (ref 150–450)
PMV BLD AUTO: 10.3 FL (ref 10.8–14.1)
POTASSIUM SERPL-SCNC: 3.5 MMOL/L (ref 3.5–5.1)
PROT SERPL-MCNC: 7.7 G/DL (ref 6.3–8.2)
Q-T INTERVAL, ECG07: 348 MS
QRS DURATION, ECG06: 122 MS
QTC CALCULATION (BEZET), ECG08: 432 MS
RBC # BLD AUTO: 4.89 M/UL (ref 4.23–5.67)
SODIUM SERPL-SCNC: 144 MMOL/L (ref 136–145)
TROPONIN I BLD-MCNC: 0.01 NG/ML (ref 0.02–0.05)
VENTRICULAR RATE, ECG03: 93 BPM
WBC # BLD AUTO: 5 K/UL (ref 4.3–11.1)

## 2018-07-12 PROCEDURE — 80307 DRUG TEST PRSMV CHEM ANLYZR: CPT | Performed by: STUDENT IN AN ORGANIZED HEALTH CARE EDUCATION/TRAINING PROGRAM

## 2018-07-12 PROCEDURE — 93005 ELECTROCARDIOGRAM TRACING: CPT | Performed by: STUDENT IN AN ORGANIZED HEALTH CARE EDUCATION/TRAINING PROGRAM

## 2018-07-12 PROCEDURE — 85025 COMPLETE CBC W/AUTO DIFF WBC: CPT | Performed by: STUDENT IN AN ORGANIZED HEALTH CARE EDUCATION/TRAINING PROGRAM

## 2018-07-12 PROCEDURE — 80053 COMPREHEN METABOLIC PANEL: CPT | Performed by: STUDENT IN AN ORGANIZED HEALTH CARE EDUCATION/TRAINING PROGRAM

## 2018-07-12 PROCEDURE — 99284 EMERGENCY DEPT VISIT MOD MDM: CPT | Performed by: STUDENT IN AN ORGANIZED HEALTH CARE EDUCATION/TRAINING PROGRAM

## 2018-07-12 PROCEDURE — 84484 ASSAY OF TROPONIN QUANT: CPT

## 2018-07-12 PROCEDURE — 83735 ASSAY OF MAGNESIUM: CPT | Performed by: STUDENT IN AN ORGANIZED HEALTH CARE EDUCATION/TRAINING PROGRAM

## 2018-07-12 NOTE — ED NOTES
Walking down [address] with backpack in hand. Stating \" I need to get home\"  Pt removed IV. Bleeding controlled. Redirected pt back to treatment room.

## 2018-07-12 NOTE — ED PROVIDER NOTES
HPI Comments: 55-year-old male patient presents via EMS with various reports of pain including general body aches/cramping and intermittent chest discomfort. Patient admits to using alcohol earlier tonight and states he drank 2 beers. He drinks regularly poor reports. He denies shortness of breath, diaphoresis, nausea or vomiting with this discomfort. Pain is poorly described by patient as he is intoxicated on his initial evaluation. He does state this pain has been present for at least 2 days. Poor historian. Patient is a 78 y.o. male presenting with alcohol problem. The history is provided by the patient. Alcohol Problem   There areno weakness present at this time. This is a recurrent problem. Suspected agents include alcohol. Pertinent negatives include no fever, no nausea and no vomiting. Past Medical History:   Diagnosis Date    Anxiety     Dermatophytosis of nail     Diabetes ([address] 9/12/2014    [facility] [address]     Heart murmur     AORTIC SYSTOLIC FLOW MURMUR, [address]    Hepatic encephalopathy ([address] 5/5/2014    Hyperlipidemia     Hypertension     Metatarsal stress fracture     Metatarsalgia     Osteoarthritis     Peripheral edema     Psychiatric disorder     depression    UTI (urinary tract infection)        Past Surgical History:   Procedure Laterality Date    HX HERNIA REPAIR  2013    HX LAPAROTOMY  2012    ABDOMINAL SURGERY     HX OTHER SURGICAL      cyst removed from back x 1 week    NEUROLOGICAL PROCEDURE UNLISTED      plate in  head. .         Family History:   Problem Relation Age of Onset    Colon Cancer Mother     Stroke Father     Hypertension Sister        Social History     Social History    Marital status:      Spouse name: N/A    Number of children: N/A    Years of education: N/A     Occupational History    Not on file.      Social History Main Topics    Smoking status: Current Every Day Smoker    Smokeless tobacco: Never Used      Comment: LIGHT TOBACCO SMOKER 1-2 A DAY     Alcohol use 0.0 oz/week      Comment: large unknown amount daily    Drug use: No    Sexual activity: Not Currently     Other Topics Concern    Not on file     Social History Narrative         ALLERGIES: Pcn [penicillins]    Review of Systems   Constitutional: Negative for chills, diaphoresis and fever. HENT: Negative for congestion, sneezing and sore throat. Eyes: Negative for visual disturbance. Respiratory: Negative for cough, chest tightness, shortness of breath and wheezing. Cardiovascular: Positive for chest pain. Negative for leg swelling. Gastrointestinal: Negative for abdominal pain, blood in stool, diarrhea, nausea and vomiting. Endocrine: Negative for polyuria. Genitourinary: Negative for difficulty urinating, dysuria, flank pain, hematuria and urgency. Musculoskeletal: Positive for myalgias. Negative for back pain, neck pain and neck stiffness. Skin: Negative for color change and rash. Neurological: Negative for dizziness, syncope, speech difficulty, weakness, light-headedness, numbness and headaches. Psychiatric/Behavioral: Negative for behavioral problems. All other systems reviewed and are negative. Vitals:    07/12/18 0347 07/12/18 0359 07/12/18 0400   BP: 127/73  129/82   Pulse: 98 (!) 108    Resp: 18     Temp: 98.5 °F (36.9 °C)     SpO2: 93% 91%             Physical Exam   Constitutional: He is oriented to person, place, and time. He appears well-developed and well-nourished. No distress. Patient appears intoxicated and smells of alcohol. Alert and oriented to person, place and time. No acute distress. Speaks in clear, fluent sentences. HENT:   Head: Normocephalic and atraumatic. Nose: Nose normal.   Eyes: Conjunctivae and EOM are normal. Pupils are equal, round, and reactive to light. Neck: Normal range of motion. Neck supple. No JVD present. No tracheal deviation present.    Cardiovascular: Normal rate, regular rhythm, S1 normal, S2 normal, normal heart sounds and intact distal pulses. Exam reveals no gallop, no distant heart sounds and no friction rub. No murmur heard. Pulmonary/Chest: Effort normal and breath sounds normal. No accessory muscle usage or stridor. No tachypnea and no bradypnea. No respiratory distress. He has no decreased breath sounds. He has no wheezes. He has no rhonchi. He has no rales. He exhibits no tenderness. Abdominal: Soft. Normal appearance. He exhibits no distension and no mass. There is no hepatosplenomegaly, splenomegaly or hepatomegaly. There is no tenderness. There is no rigidity, no rebound, no guarding, no CVA tenderness, no tenderness at McBurney's point and negative Mayfield's sign. Musculoskeletal: Normal range of motion. He exhibits no edema, tenderness or deformity. Neurological: He is alert and oriented to person, place, and time. No cranial nerve deficit. Skin: Skin is warm and dry. No rash noted. He is not diaphoretic. Psychiatric: He has a normal mood and affect. His behavior is normal.   Nursing note and vitals reviewed. MDM  Number of Diagnoses or Management Options  Alcohol abuse: established and worsening  Diagnosis management comments: EKG obtained on arrival shows a normal sinus rhythm without evidence of acute ischemia. The suspicion for cardiac cause of patient's pain as it is intermittent in nature and has been present for at least 2 days. Initial evaluation is unremarkable aside from slight increase in patient's creatinine level. Apparently within normal limits    Unfortunately patient eloped prior to completion of his treatment. He was able to ambulate about the department without difficulty.        Amount and/or Complexity of Data Reviewed  Clinical lab tests: ordered and reviewed  Tests in the radiology section of CPT®: ordered and reviewed  Tests in the medicine section of CPT®: ordered and reviewed  Independent visualization of images, tracings, or specimens: yes    Risk of Complications, Morbidity, and/or Mortality  Presenting problems: moderate  Diagnostic procedures: low  Management options: moderate    Patient Progress  Patient progress: stable        ED Course       Procedures

## 2018-07-12 NOTE — ED NOTES
Patient found out of bed and sitting in chair with phone in hand. Patient states that he would like to leave. Patient's IV found in bed. Bleeding appears to have stopped at removal site. Charge RN aware.

## 2018-07-12 NOTE — ED NOTES
Backpack in hand again heading toward waiting room down back [address] to avoid staff. Again greeted pt and redirected to treatment. Pt refused and headed toward waiting room. Dr. Rodney Jeffery aware.

## 2018-07-12 NOTE — ED TRIAGE NOTES
Pt arrives to ED via [facility] from home. Initial complaint of low blood sugar, which was 118. Then complaining of headache and now back pain. Hx HTN and DM type 1. Pt states he has been drinking and had 2 beers tonight.

## 2018-07-31 ENCOUNTER — APPOINTMENT (OUTPATIENT)
Dept: GENERAL RADIOLOGY | Age: 68
End: 2018-07-31
Attending: EMERGENCY MEDICINE
Payer: MEDICARE

## 2018-07-31 ENCOUNTER — HOSPITAL ENCOUNTER (EMERGENCY)
Age: 68
Discharge: HOME | End: 2018-07-31
Attending: EMERGENCY MEDICINE
Payer: MEDICARE

## 2018-07-31 ENCOUNTER — APPOINTMENT (OUTPATIENT)
Dept: CT IMAGING | Age: 68
End: 2018-07-31
Attending: EMERGENCY MEDICINE
Payer: MEDICARE

## 2018-07-31 VITALS
TEMPERATURE: 98.1 F | DIASTOLIC BLOOD PRESSURE: 91 MMHG | OXYGEN SATURATION: 97 % | BODY MASS INDEX: 26.66 KG/M2 | SYSTOLIC BLOOD PRESSURE: 132 MMHG | WEIGHT: 180 LBS | HEIGHT: 69 IN | RESPIRATION RATE: 18 BRPM | HEART RATE: 98 BPM

## 2018-07-31 DIAGNOSIS — R51.9 ACUTE NONINTRACTABLE HEADACHE, UNSPECIFIED HEADACHE TYPE: Primary | ICD-10-CM

## 2018-07-31 LAB
ALBUMIN SERPL-MCNC: 3.7 G/DL (ref 3.2–4.6)
ALBUMIN/GLOB SERPL: 0.9 {RATIO} (ref 1.2–3.5)
ALP SERPL-CCNC: 94 U/L (ref 50–136)
ALT SERPL-CCNC: 26 U/L (ref 12–65)
ANION GAP SERPL CALC-SCNC: 9 MMOL/L (ref 7–16)
AST SERPL-CCNC: 21 U/L (ref 15–37)
BASOPHILS # BLD: 0 K/UL (ref 0–0.2)
BASOPHILS NFR BLD: 1 % (ref 0–2)
BILIRUB DIRECT SERPL-MCNC: 0.1 MG/DL
BILIRUB SERPL-MCNC: 0.6 MG/DL (ref 0.2–1.1)
BUN SERPL-MCNC: 8 MG/DL (ref 8–23)
CALCIUM SERPL-MCNC: 8.9 MG/DL (ref 8.3–10.4)
CHLORIDE SERPL-SCNC: 110 MMOL/L (ref 98–107)
CO2 SERPL-SCNC: 24 MMOL/L (ref 21–32)
CREAT SERPL-MCNC: 1.6 MG/DL (ref 0.8–1.5)
DIFFERENTIAL METHOD BLD: ABNORMAL
EOSINOPHIL # BLD: 0 K/UL (ref 0–0.8)
EOSINOPHIL NFR BLD: 1 % (ref 0.5–7.8)
ERYTHROCYTE [DISTWIDTH] IN BLOOD BY AUTOMATED COUNT: 14.3 % (ref 11.9–14.6)
ETHANOL SERPL-MCNC: 270 MG/DL
GLOBULIN SER CALC-MCNC: 4 G/DL (ref 2.3–3.5)
GLUCOSE SERPL-MCNC: 96 MG/DL (ref 65–100)
HCT VFR BLD AUTO: 43.8 % (ref 41.1–50.3)
HGB BLD-MCNC: 15.8 G/DL (ref 13.6–17.2)
IMM GRANULOCYTES # BLD: 0 K/UL (ref 0–0.5)
IMM GRANULOCYTES NFR BLD AUTO: 0 % (ref 0–5)
LIPASE SERPL-CCNC: 487 U/L (ref 73–393)
LYMPHOCYTES # BLD: 2.3 K/UL (ref 0.5–4.6)
LYMPHOCYTES NFR BLD: 40 % (ref 13–44)
MCH RBC QN AUTO: 33.1 PG (ref 26.1–32.9)
MCHC RBC AUTO-ENTMCNC: 36.1 G/DL (ref 31.4–35)
MCV RBC AUTO: 91.6 FL (ref 79.6–97.8)
MONOCYTES # BLD: 0.3 K/UL (ref 0.1–1.3)
MONOCYTES NFR BLD: 5 % (ref 4–12)
NEUTS SEG # BLD: 3 K/UL (ref 1.7–8.2)
NEUTS SEG NFR BLD: 53 % (ref 43–78)
PLATELET # BLD AUTO: 162 K/UL (ref 150–450)
PMV BLD AUTO: 10.1 FL (ref 10.8–14.1)
POTASSIUM SERPL-SCNC: 3.5 MMOL/L (ref 3.5–5.1)
PROT SERPL-MCNC: 7.7 G/DL (ref 6.3–8.2)
RBC # BLD AUTO: 4.78 M/UL (ref 4.23–5.67)
SODIUM SERPL-SCNC: 143 MMOL/L (ref 136–145)
WBC # BLD AUTO: 5.7 K/UL (ref 4.3–11.1)

## 2018-07-31 PROCEDURE — 71045 X-RAY EXAM CHEST 1 VIEW: CPT

## 2018-07-31 PROCEDURE — 80048 BASIC METABOLIC PNL TOTAL CA: CPT | Performed by: EMERGENCY MEDICINE

## 2018-07-31 PROCEDURE — 74011250636 HC RX REV CODE- 250/636: Performed by: EMERGENCY MEDICINE

## 2018-07-31 PROCEDURE — 80076 HEPATIC FUNCTION PANEL: CPT | Performed by: EMERGENCY MEDICINE

## 2018-07-31 PROCEDURE — 99284 EMERGENCY DEPT VISIT MOD MDM: CPT | Performed by: EMERGENCY MEDICINE

## 2018-07-31 PROCEDURE — 83690 ASSAY OF LIPASE: CPT | Performed by: EMERGENCY MEDICINE

## 2018-07-31 PROCEDURE — 85025 COMPLETE CBC W/AUTO DIFF WBC: CPT | Performed by: EMERGENCY MEDICINE

## 2018-07-31 PROCEDURE — 80307 DRUG TEST PRSMV CHEM ANLYZR: CPT | Performed by: EMERGENCY MEDICINE

## 2018-07-31 PROCEDURE — 96374 THER/PROPH/DIAG INJ IV PUSH: CPT | Performed by: EMERGENCY MEDICINE

## 2018-07-31 RX ORDER — ONDANSETRON 2 MG/ML
4 INJECTION INTRAMUSCULAR; INTRAVENOUS
Status: COMPLETED | OUTPATIENT
Start: 2018-07-31 | End: 2018-07-31

## 2018-07-31 RX ADMIN — SODIUM CHLORIDE 1000 ML: 900 INJECTION, SOLUTION INTRAVENOUS at 06:41

## 2018-07-31 RX ADMIN — ONDANSETRON 4 MG: 2 INJECTION INTRAMUSCULAR; INTRAVENOUS at 06:41

## 2018-07-31 NOTE — ED PROVIDER NOTES
HPI Comments: Patient has been seen in our ER before for alcohol abuse. Presents with bilateral frontal headache and bilateral leg pain. Brought her by EMS. He is slow to answer questions. Denies any chest pain or shortness of breath. Patient is a 78 y.o. male presenting with head problem. The history is provided by the patient. No  was used. Head Pain This is a new problem. The current episode started more than 1 week ago. The problem occurs constantly. The problem has not changed since onset. The headache is aggravated by nothing. The pain is located in the bilateral and frontal region. The quality of the pain is described as throbbing. The pain is moderate. Pertinent negatives include no fever, no malaise/fatigue, no chest pressure, no orthopnea, no palpitations, no shortness of breath, no weakness, no tingling, no dizziness, no visual change, no nausea and no vomiting. He has tried nothing for the symptoms. Past Medical History:  
Diagnosis Date  Anxiety  Dermatophytosis of nail  Diabetes ([facility] [address] 9/12/2014  Diabetic polyneuropathy ([address]  Heart murmur AORTIC SYSTOLIC FLOW MURMUR, [address]  
 Hepatic encephalopathy ([address] 5/5/2014  Hyperlipidemia  Hypertension  Metatarsal stress fracture  Metatarsalgia  Osteoarthritis  Peripheral edema  Psychiatric disorder   
 depression  UTI (urinary tract infection) Past Surgical History:  
Procedure Laterality Date  HX HERNIA REPAIR  2013  HX LAPAROTOMY  2012 ABDOMINAL SURGERY   
 HX OTHER SURGICAL    
 cyst removed from back x 1 week  NEUROLOGICAL PROCEDURE UNLISTED    
 plate in  head. Nathen Bucio Family History:  
Problem Relation Age of Onset  Colon Cancer Mother  Stroke Father  Hypertension Sister Social History Social History  Marital status:    Spouse name: N/A  
 Number of children: N/A  
 Years of education: N/A Occupational History  Not on file. Social History Main Topics  Smoking status: Current Every Day Smoker  Smokeless tobacco: Never Used Comment: LIGHT TOBACCO SMOKER 1-2 A DAY  Alcohol use 0.0 oz/week Comment: large unknown amount daily  Drug use: No  
 Sexual activity: Not Currently Other Topics Concern  Not on file Social History Narrative ALLERGIES: Pcn [penicillins] Review of Systems Constitutional: Negative for chills, fever and malaise/fatigue. HENT: Negative for rhinorrhea and sore throat. Eyes: Negative for pain and redness. Respiratory: Negative for chest tightness, shortness of breath and wheezing. Cardiovascular: Negative for chest pain, palpitations, orthopnea and leg swelling. Gastrointestinal: Negative for abdominal pain, diarrhea, nausea and vomiting. Genitourinary: Negative for dysuria and hematuria. Musculoskeletal: Positive for arthralgias. Negative for back pain, gait problem, neck pain and neck stiffness. Skin: Negative for color change and rash. Neurological: Positive for headaches. Negative for dizziness, tingling, weakness and numbness. Psychiatric/Behavioral: Negative for confusion. Vitals:  
 07/31/18 8277 07/31/18 7115 BP: 115/74 131/86 Pulse: 100 97 Resp: 16 Temp: 98.1 °F (36.7 °C) SpO2: 96% 97% Weight: 81.6 kg (180 lb) Height: 5' 9\" (1.753 m) Physical Exam  
Constitutional: He is oriented to person, place, and time. He appears well-developed and well-nourished. HENT:  
Head: Normocephalic and atraumatic. Eyes: Conjunctivae and EOM are normal. Pupils are equal, round, and reactive to light. Neck: Normal range of motion. Neck supple. Cardiovascular: Normal rate and regular rhythm. No murmur heard. Pulmonary/Chest: Effort normal and breath sounds normal. He has no wheezes. Abdominal: Soft. Bowel sounds are normal. There is no tenderness.   
Musculoskeletal: Normal range of motion. He exhibits tenderness (left foot). He exhibits no edema. Neurological: He is alert and oriented to person, place, and time. No cranial nerve deficit. Slow to answer but answer appropriately. Skin: Skin is warm and dry. Nursing note and vitals reviewed. MDM Number of Diagnoses or Management Options Diagnosis management comments: Pt decided he did not want to be here anymore. He took his IV out and left the ER. Our nurse try to keep him here for further workup. He left anyways. Did not talk with me. Amount and/or Complexity of Data Reviewed Clinical lab tests: ordered and reviewed Tests in the radiology section of CPT®: ordered and reviewed Tests in the medicine section of CPT®: ordered and reviewed Patient Progress Patient progress: stable ED Course Procedures

## 2018-07-31 NOTE — ED NOTES
Pt came to the ed c/o leg pain for a couple of weeks. Denies cp or sob. Pt is alert to self but disoriented to year and situation. Discussed poc. Call bell in reach.

## 2018-07-31 NOTE — ED TRIAGE NOTES
Pt c\o headache and bilateral leg pain and left wrist pain also c\o kidney pain which he has had for years per pt

## 2018-09-13 ENCOUNTER — HOSPITAL ENCOUNTER (EMERGENCY)
Age: 68
Discharge: HOME | End: 2018-09-14
Attending: EMERGENCY MEDICINE
Payer: MEDICARE

## 2018-09-13 VITALS
BODY MASS INDEX: 26.64 KG/M2 | TEMPERATURE: 98.1 F | SYSTOLIC BLOOD PRESSURE: 153 MMHG | OXYGEN SATURATION: 96 % | DIASTOLIC BLOOD PRESSURE: 83 MMHG | WEIGHT: 179.9 LBS | RESPIRATION RATE: 18 BRPM | HEART RATE: 107 BPM | HEIGHT: 69 IN

## 2018-09-13 DIAGNOSIS — F10.920 ALCOHOLIC INTOXICATION WITHOUT COMPLICATION (HCC): Primary | ICD-10-CM

## 2018-09-13 LAB
ALBUMIN SERPL-MCNC: 4 G/DL (ref 3.2–4.6)
ALBUMIN/GLOB SERPL: 1.1 {RATIO} (ref 1.2–3.5)
ALP SERPL-CCNC: 98 U/L (ref 50–136)
ALT SERPL-CCNC: 26 U/L (ref 12–65)
ANION GAP SERPL CALC-SCNC: 11 MMOL/L (ref 7–16)
AST SERPL-CCNC: 22 U/L (ref 15–37)
BASOPHILS # BLD: 0.1 K/UL (ref 0–0.2)
BASOPHILS NFR BLD: 1 % (ref 0–2)
BILIRUB SERPL-MCNC: 0.4 MG/DL (ref 0.2–1.1)
BUN SERPL-MCNC: 14 MG/DL (ref 8–23)
CALCIUM SERPL-MCNC: 8.9 MG/DL (ref 8.3–10.4)
CHLORIDE SERPL-SCNC: 109 MMOL/L (ref 98–107)
CO2 SERPL-SCNC: 23 MMOL/L (ref 21–32)
CREAT SERPL-MCNC: 1.31 MG/DL (ref 0.8–1.5)
DIFFERENTIAL METHOD BLD: ABNORMAL
EOSINOPHIL # BLD: 0.1 K/UL (ref 0–0.8)
EOSINOPHIL NFR BLD: 2 % (ref 0.5–7.8)
ERYTHROCYTE [DISTWIDTH] IN BLOOD BY AUTOMATED COUNT: 14.6 %
ETHANOL SERPL-MCNC: 373 MG/DL
GLOBULIN SER CALC-MCNC: 3.7 G/DL (ref 2.3–3.5)
GLUCOSE SERPL-MCNC: 111 MG/DL (ref 65–100)
HCT VFR BLD AUTO: 44.5 % (ref 41.1–50.3)
HGB BLD-MCNC: 15.6 G/DL (ref 13.6–17.2)
IMM GRANULOCYTES # BLD: 0 K/UL (ref 0–0.5)
IMM GRANULOCYTES NFR BLD AUTO: 0 % (ref 0–5)
LIPASE SERPL-CCNC: 498 U/L (ref 73–393)
LYMPHOCYTES # BLD: 2.3 K/UL (ref 0.5–4.6)
LYMPHOCYTES NFR BLD: 39 % (ref 13–44)
MAGNESIUM SERPL-MCNC: 2.2 MG/DL (ref 1.8–2.4)
MCH RBC QN AUTO: 32.8 PG (ref 26.1–32.9)
MCHC RBC AUTO-ENTMCNC: 35.1 G/DL (ref 31.4–35)
MCV RBC AUTO: 93.7 FL (ref 79.6–97.8)
MONOCYTES # BLD: 0.6 K/UL (ref 0.1–1.3)
MONOCYTES NFR BLD: 9 % (ref 4–12)
NEUTS SEG # BLD: 2.9 K/UL (ref 1.7–8.2)
NEUTS SEG NFR BLD: 49 % (ref 43–78)
NRBC # BLD: 0 K/UL (ref 0–0.2)
PLATELET # BLD AUTO: 157 K/UL (ref 150–450)
PMV BLD AUTO: 10.6 FL (ref 9.4–12.3)
POTASSIUM SERPL-SCNC: 3.4 MMOL/L (ref 3.5–5.1)
PROT SERPL-MCNC: 7.7 G/DL (ref 6.3–8.2)
RBC # BLD AUTO: 4.75 M/UL (ref 4.23–5.6)
SODIUM SERPL-SCNC: 143 MMOL/L (ref 136–145)
WBC # BLD AUTO: 6 K/UL (ref 4.3–11.1)

## 2018-09-13 PROCEDURE — 99284 EMERGENCY DEPT VISIT MOD MDM: CPT | Performed by: EMERGENCY MEDICINE

## 2018-09-13 PROCEDURE — 80307 DRUG TEST PRSMV CHEM ANLYZR: CPT

## 2018-09-13 PROCEDURE — 85025 COMPLETE CBC W/AUTO DIFF WBC: CPT

## 2018-09-13 PROCEDURE — 80053 COMPREHEN METABOLIC PANEL: CPT

## 2018-09-13 PROCEDURE — 83735 ASSAY OF MAGNESIUM: CPT

## 2018-09-13 PROCEDURE — 83690 ASSAY OF LIPASE: CPT

## 2018-09-14 NOTE — ED PROVIDER NOTES
HPI Comments: Patient presents to the ER for alcohol intoxication. Apparently patient had been somewhat more belligerent at home. Patient did admit to drinking significant amount of alcohol tonight. History is limited by the patient's current intoxication    Patient is a 78 y.o. male presenting with intoxication. The history is provided by the patient. The history is limited by the condition of the patient. Alcohol intoxication   Primary symptoms include: intoxication. This is a new problem. The current episode started 6 to 12 hours ago. The problem has not changed since onset. Past Medical History:   Diagnosis Date    Anxiety     Dermatophytosis of nail     Diabetes ([facility] [address] 9/12/2014    Diabetic polyneuropathy ([facility] [address]     Heart murmur     AORTIC SYSTOLIC FLOW MURMUR, [address]    Hepatic encephalopathy ([facility] [address] 5/5/2014    Hyperlipidemia     Hypertension     Metatarsal stress fracture     Metatarsalgia     Osteoarthritis     Peripheral edema     Psychiatric disorder     depression    UTI (urinary tract infection)        Past Surgical History:   Procedure Laterality Date    HX HERNIA REPAIR  2013    HX LAPAROTOMY  2012    ABDOMINAL SURGERY     HX OTHER SURGICAL      cyst removed from back x 1 week    NEUROLOGICAL PROCEDURE UNLISTED      plate in  head. .         Family History:   Problem Relation Age of Onset    Colon Cancer Mother     Stroke Father     Hypertension Sister        Social History     Social History    Marital status:      Spouse name: N/A    Number of children: N/A    Years of education: N/A     Occupational History    Not on file.      Social History Main Topics    Smoking status: Current Every Day Smoker    Smokeless tobacco: Never Used      Comment: LIGHT TOBACCO SMOKER 1-2 A DAY     Alcohol use 0.0 oz/week      Comment: large unknown amount daily    Drug use: No    Sexual activity: Not Currently     Other Topics Concern    Not on file     Social History Narrative         ALLERGIES: Pcn [penicillins]    Review of Systems   Unable to perform ROS: Acuity of condition       Vitals:    09/13/18 2043   BP: 153/83   Pulse: (!) 107   Resp: 18   Temp: 98.1 °F (36.7 °C)   SpO2: 96%   Weight: 81.6 kg (179 lb 14.3 oz)   Height: 5' 9\" (1.753 m)            Physical Exam   Constitutional: He appears well-developed and well-nourished. intoxicated   Eyes: Conjunctivae and EOM are normal.   Cardiovascular: Normal rate, regular rhythm and intact distal pulses. Pulmonary/Chest: Effort normal and breath sounds normal. No respiratory distress. He has no wheezes. Abdominal: Soft. Bowel sounds are normal. He exhibits no distension. There is no tenderness. Neurological: He is alert. Nursing note and vitals reviewed. MDM  Number of Diagnoses or Management Options  Diagnosis management comments:  Will continue to monitor symptoms, check basic labs including electrolytes       Amount and/or Complexity of Data Reviewed  Clinical lab tests: ordered and reviewed    Risk of Complications, Morbidity, and/or Mortality  Presenting problems: moderate  Diagnostic procedures: moderate  Management options: moderate    Patient Progress  Patient progress: stable        ED Course       Procedures

## 2018-09-14 NOTE — ED NOTES
I have reviewed discharge instructions with the patient. The patient verbalized understanding. Patient left ED via Discharge Method: wheelchair to Home with (insert name of family/friend, self, transport wife). Opportunity for questions and clarification provided. Patient given 0 scripts. To continue your aftercare when you leave the hospital, you may receive an automated call from our care team to check in on how you are doing. This is a free service and part of our promise to provide the best care and service to meet your aftercare needs.  If you have questions, or wish to unsubscribe from this service please call [phone]. Thank you for Choosing our [facility].

## 2018-09-14 NOTE — ED TRIAGE NOTES
Pt arrived via EMS from home c/o alcohol intoxication. Pt is hard to understand when speaking and is not able to tell much information about what happened tonight but does admit to drinking tonight.

## 2018-11-03 ENCOUNTER — APPOINTMENT (OUTPATIENT)
Dept: GENERAL RADIOLOGY | Age: 68
End: 2018-11-03
Attending: EMERGENCY MEDICINE
Payer: MEDICARE

## 2018-11-03 ENCOUNTER — HOSPITAL ENCOUNTER (EMERGENCY)
Age: 68
Discharge: HOME | End: 2018-11-03
Attending: EMERGENCY MEDICINE
Payer: MEDICARE

## 2018-11-03 VITALS
DIASTOLIC BLOOD PRESSURE: 98 MMHG | WEIGHT: 179 LBS | RESPIRATION RATE: 16 BRPM | SYSTOLIC BLOOD PRESSURE: 186 MMHG | HEIGHT: 69 IN | HEART RATE: 80 BPM | BODY MASS INDEX: 26.51 KG/M2 | TEMPERATURE: 98.1 F | OXYGEN SATURATION: 96 %

## 2018-11-03 DIAGNOSIS — K59.00 CONSTIPATION, UNSPECIFIED CONSTIPATION TYPE: Primary | ICD-10-CM

## 2018-11-03 LAB
ALBUMIN SERPL-MCNC: 4.2 G/DL (ref 3.2–4.6)
ALBUMIN/GLOB SERPL: 1.2 {RATIO} (ref 1.2–3.5)
ALP SERPL-CCNC: 112 U/L (ref 50–136)
ALT SERPL-CCNC: 28 U/L (ref 12–65)
ANION GAP SERPL CALC-SCNC: 9 MMOL/L (ref 7–16)
AST SERPL-CCNC: 28 U/L (ref 15–37)
BASOPHILS # BLD: 0 K/UL (ref 0–0.2)
BASOPHILS NFR BLD: 0 % (ref 0–2)
BILIRUB SERPL-MCNC: 2 MG/DL (ref 0.2–1.1)
BUN SERPL-MCNC: 9 MG/DL (ref 8–23)
CALCIUM SERPL-MCNC: 8.8 MG/DL (ref 8.3–10.4)
CHLORIDE SERPL-SCNC: 105 MMOL/L (ref 98–107)
CO2 SERPL-SCNC: 25 MMOL/L (ref 21–32)
CREAT SERPL-MCNC: 1.35 MG/DL (ref 0.8–1.5)
DIFFERENTIAL METHOD BLD: ABNORMAL
EOSINOPHIL # BLD: 0 K/UL (ref 0–0.8)
EOSINOPHIL NFR BLD: 1 % (ref 0.5–7.8)
ERYTHROCYTE [DISTWIDTH] IN BLOOD BY AUTOMATED COUNT: 12.9 %
GLOBULIN SER CALC-MCNC: 3.5 G/DL (ref 2.3–3.5)
GLUCOSE SERPL-MCNC: 94 MG/DL (ref 65–100)
HCT VFR BLD AUTO: 46.6 % (ref 41.1–50.3)
HGB BLD-MCNC: 16.1 G/DL (ref 13.6–17.2)
IMM GRANULOCYTES # BLD: 0 K/UL (ref 0–0.5)
IMM GRANULOCYTES NFR BLD AUTO: 0 % (ref 0–5)
LYMPHOCYTES # BLD: 1.4 K/UL (ref 0.5–4.6)
LYMPHOCYTES NFR BLD: 26 % (ref 13–44)
MCH RBC QN AUTO: 32.9 PG (ref 26.1–32.9)
MCHC RBC AUTO-ENTMCNC: 34.5 G/DL (ref 31.4–35)
MCV RBC AUTO: 95.1 FL (ref 79.6–97.8)
MONOCYTES # BLD: 0.5 K/UL (ref 0.1–1.3)
MONOCYTES NFR BLD: 9 % (ref 4–12)
NEUTS SEG # BLD: 3.4 K/UL (ref 1.7–8.2)
NEUTS SEG NFR BLD: 64 % (ref 43–78)
NRBC # BLD: 0 K/UL (ref 0–0.2)
PLATELET # BLD AUTO: 141 K/UL (ref 150–450)
PMV BLD AUTO: 10.1 FL (ref 9.4–12.3)
POTASSIUM SERPL-SCNC: 3.3 MMOL/L (ref 3.5–5.1)
PROT SERPL-MCNC: 7.7 G/DL (ref 6.3–8.2)
RBC # BLD AUTO: 4.9 M/UL (ref 4.23–5.6)
SODIUM SERPL-SCNC: 139 MMOL/L (ref 136–145)
WBC # BLD AUTO: 5.4 K/UL (ref 4.3–11.1)

## 2018-11-03 PROCEDURE — 85025 COMPLETE CBC W/AUTO DIFF WBC: CPT

## 2018-11-03 PROCEDURE — 74022 RADEX COMPL AQT ABD SERIES: CPT

## 2018-11-03 PROCEDURE — 80053 COMPREHEN METABOLIC PANEL: CPT

## 2018-11-03 PROCEDURE — 99283 EMERGENCY DEPT VISIT LOW MDM: CPT | Performed by: EMERGENCY MEDICINE

## 2018-11-03 RX ORDER — DOCUSATE SODIUM 100 MG/1
100 CAPSULE, LIQUID FILLED ORAL 2 TIMES DAILY
Qty: 60 CAP | Refills: 2 | Status: SHIPPED | OUTPATIENT
Start: 2018-11-03 | End: 2019-02-01

## 2018-11-03 NOTE — ED PROVIDER NOTES
67-year-old male presenting for constipation. States he hasn't had a bowel movement in 3 days. He thinks it's related to eating a lot of cheese. Denies any fevers or chills. He's had no vomiting. He does have some low abdominal cramping that radiates to his lower back but otherwise has no complaints The history is provided by the patient. Constipation This is a new problem. The current episode started more than 2 days ago. Associated symptoms include abdominal pain, flatus, back pain and constipation. Pertinent negatives include no dysuria, no abdominal distention, no chills, no fever, no nausea, no vomiting and no diarrhea. He has tried nothing for the symptoms. Past Medical History:  
Diagnosis Date  Anxiety  Dermatophytosis of nail  Diabetes ([facility] [address] 9/12/2014  Diabetic polyneuropathy ([facility] [address]  Heart murmur AORTIC SYSTOLIC FLOW MURMUR, [address]  
 Hepatic encephalopathy ([facility] [address] 5/5/2014  Hyperlipidemia  Hypertension  Metatarsal stress fracture  Metatarsalgia  Osteoarthritis  Peripheral edema  Psychiatric disorder   
 depression  UTI (urinary tract infection) Past Surgical History:  
Procedure Laterality Date  HX HERNIA REPAIR  2013  HX LAPAROTOMY  2012 ABDOMINAL SURGERY   
 HX OTHER SURGICAL    
 cyst removed from back x 1 week  NEUROLOGICAL PROCEDURE UNLISTED    
 plate in  head. [address] Family History:  
Problem Relation Age of Onset  Colon Cancer Mother  Stroke Father  Hypertension Sister Social History Socioeconomic History  Marital status:  Spouse name: Not on file  Number of children: Not on file  Years of education: Not on file  Highest education level: Not on file Social Needs  Financial resource strain: Not on file  Food insecurity - worry: Not on file  Food insecurity - inability: Not on file  Transportation needs - medical: Not on file  Transportation needs - non-medical: Not on file Occupational History  Not on file Tobacco Use  Smoking status: Current Every Day Smoker  Smokeless tobacco: Never Used  Tobacco comment: LIGHT TOBACCO SMOKER 1-2 A DAY Substance and Sexual Activity  Alcohol use: Yes Alcohol/week: 0.0 oz  
  Comment: large unknown amount daily  Drug use: No  
 Sexual activity: Not Currently Other Topics Concern  Not on file Social History Narrative  Not on file ALLERGIES: Pcn [penicillins] Review of Systems Constitutional: Negative for chills and fever. Gastrointestinal: Positive for abdominal pain, constipation and flatus. Negative for abdominal distention, diarrhea, nausea and vomiting. Genitourinary: Negative for dysuria. Musculoskeletal: Positive for back pain. All other systems reviewed and are negative. Vitals:  
 11/03/18 1838 BP: (!) 199/114 Pulse: 88 Resp: 20 Temp: 97.9 °F (36.6 °C) SpO2: 96% Weight: 81.2 kg (179 lb) Height: 5' 9\" (1.753 m) Physical Exam  
Constitutional: He is oriented to person, place, and time. He appears well-developed and well-nourished. HENT:  
Head: Normocephalic and atraumatic. Eyes: Conjunctivae and EOM are normal. Pupils are equal, round, and reactive to light. Neck: Normal range of motion. Neck supple. Cardiovascular: Normal rate, regular rhythm, normal heart sounds and intact distal pulses. Pulmonary/Chest: Effort normal and breath sounds normal.  
Abdominal: Soft. Bowel sounds are normal.  
Musculoskeletal: Normal range of motion. He exhibits no deformity. Neurological: He is alert and oriented to person, place, and time. No cranial nerve deficit. Skin: Skin is warm and dry. Psychiatric: He has a normal mood and affect. His behavior is normal.  
Nursing note and vitals reviewed. MDM Number of Diagnoses or Management Options Constipation, unspecified constipation type:  
Diagnosis management comments: 69-year-old male presenting for 3 days of no bowel movement. This likely is dysfunctional constipation. Palpation of the abdomen is benign. We will get an abdominal series x-ray and treat accordingly. Amount and/or Complexity of Data Reviewed Clinical lab tests: ordered and reviewed Tests in the radiology section of CPT®: ordered and reviewed Risk of Complications, Morbidity, and/or Mortality Presenting problems: moderate Diagnostic procedures: moderate Management options: moderate General comments: Reevaluated the patient is feeling much better after a bowel movement. He would prefer to go home. His laboratory values are not concerning. We did discharge her with a prescription for Colace M follow-up with his primary care doctor to get his blood pressure medications refilled. Patient Progress Patient progress: improved ED Course as of Nov 03 1958 Sat Nov 03, 2018  
1852 Went to the room to evaluate the patient he was not there to be seen. His belonging were in the room but he was not there. I will attempt to reevaluate the patient a few minutes  [JS] 1902 Upon my evaluation the room the patient actually stated that he felt like he probably needed to go to the bathroom to \"do #2\". [JS] 1956 Back to the room and the patient stated he had a bowel movement usually go home. Based on his belly exam, clinical presentation and laboratory results think this is safe. [JS] ED Course User Index [JS] Maddison Rodríguez MD  
 
 
Procedures

## 2018-11-03 NOTE — ED TRIAGE NOTES
C/o constipation. No bm in approx 3 days, states normally uses daily. Reports eating \"cheese\". Attempted otc meds without relief. Hx of same. Denies n/v. Reports normal urination. C/o abdominal pain and right sided abdominal pain. Abdomen soft on arrival. Reports chills.

## 2018-11-04 NOTE — ED NOTES
I have reviewed discharge instructions with the patient. The patient verbalized understanding. Patient left ED via Discharge Method: ambulatory to Home with self. Opportunity for questions and clarification provided. Patient given 1 scripts. To continue your aftercare when you leave the hospital, you may receive an automated call from our care team to check in on how you are doing. This is a free service and part of our promise to provide the best care and service to meet your aftercare needs.  If you have questions, or wish to unsubscribe from this service please call [phone]. Thank you for Choosing our [facility] Emergency Department.

## 2018-11-16 ENCOUNTER — HOSPITAL ENCOUNTER (EMERGENCY)
Age: 68
Discharge: HOME | End: 2018-11-16
Attending: EMERGENCY MEDICINE
Payer: MEDICARE

## 2018-11-16 ENCOUNTER — APPOINTMENT (OUTPATIENT)
Dept: GENERAL RADIOLOGY | Age: 68
End: 2018-11-16
Attending: EMERGENCY MEDICINE
Payer: MEDICARE

## 2018-11-16 VITALS
WEIGHT: 175 LBS | OXYGEN SATURATION: 97 % | DIASTOLIC BLOOD PRESSURE: 121 MMHG | TEMPERATURE: 98 F | HEIGHT: 66 IN | RESPIRATION RATE: 18 BRPM | HEART RATE: 78 BPM | SYSTOLIC BLOOD PRESSURE: 194 MMHG | BODY MASS INDEX: 28.12 KG/M2

## 2018-11-16 DIAGNOSIS — S86.912A KNEE STRAIN, LEFT, INITIAL ENCOUNTER: Primary | ICD-10-CM

## 2018-11-16 PROCEDURE — L1830 KO IMMOB CANVAS LONG PRE OTS: HCPCS

## 2018-11-16 PROCEDURE — 73562 X-RAY EXAM OF KNEE 3: CPT

## 2018-11-16 PROCEDURE — 74011250637 HC RX REV CODE- 250/637: Performed by: EMERGENCY MEDICINE

## 2018-11-16 PROCEDURE — 99283 EMERGENCY DEPT VISIT LOW MDM: CPT | Performed by: EMERGENCY MEDICINE

## 2018-11-16 PROCEDURE — 75810000053 HC SPLINT APPLICATION: Performed by: EMERGENCY MEDICINE

## 2018-11-16 RX ORDER — TRAMADOL HYDROCHLORIDE 50 MG/1
50 TABLET ORAL
Qty: 12 TAB | Refills: 0 | Status: SHIPPED | OUTPATIENT
Start: 2018-11-16 | End: 2018-12-07 | Stop reason: SDUPTHER

## 2018-11-16 RX ORDER — TRAMADOL HYDROCHLORIDE 50 MG/1
50 TABLET ORAL
Status: COMPLETED | OUTPATIENT
Start: 2018-11-16 | End: 2018-11-16

## 2018-11-16 RX ADMIN — TRAMADOL HYDROCHLORIDE 50 MG: 50 TABLET, FILM COATED ORAL at 20:51

## 2018-11-17 NOTE — DISCHARGE INSTRUCTIONS
Knee Pain or Injury: Care Instructions  Your Care Instructions    Injuries are a common cause of knee problems. Sudden (acute) injuries may be caused by a direct blow to the knee. They can also be caused by abnormal twisting, bending, or falling on the knee. Pain, bruising, or swelling may be severe, and may start within minutes of the injury. Overuse is another cause of knee pain. Other causes are climbing stairs, kneeling, and other activities that use the knee. Everyday wear and tear, especially as you get older, also can cause knee pain. Rest, along with home treatment, often relieves pain and allows your knee to heal. If you have a serious knee injury, you may need tests and treatment. Follow-up care is a key part of your treatment and safety. Be sure to make and go to all appointments, and call your doctor if you are having problems. It's also a good idea to know your test results and keep a list of the medicines you take. How can you care for yourself at home? · Be safe with medicines. Read and follow all instructions on the label. ? If the doctor gave you a prescription medicine for pain, take it as prescribed. ? If you are not taking a prescription pain medicine, ask your doctor if you can take an over-the-counter medicine. · Rest and protect your knee. Take a break from any activity that may cause pain. · Put ice or a cold pack on your knee for 10 to 20 minutes at a time. Put a thin cloth between the ice and your skin. · Prop up a sore knee on a pillow when you ice it or anytime you sit or lie down for the next 3 days. Try to keep it above the level of your heart. This will help reduce swelling. · If your knee is not swollen, you can put moist heat, a heating pad, or a warm cloth on your knee. · If your doctor recommends an elastic bandage, sleeve, or other type of support for your knee, wear it as directed.   · Follow your doctor's instructions about how much weight you can put on your leg. Use a cane, crutches, or a walker as instructed. · Follow your doctor's instructions about activity during your healing process. If you can do mild exercise, slowly increase your activity. · Reach and stay at a healthy weight. Extra weight can strain the joints, especially the knees and hips, and make the pain worse. Losing even a few pounds may help. When should you call for help? Call 911 anytime you think you may need emergency care. For example, call if:    · You have symptoms of a blood clot in your lung (called a pulmonary embolism). These may include:  ? Sudden chest pain. ? Trouble breathing. ? Coughing up blood.    Call your doctor now or seek immediate medical care if:    · You have severe or increasing pain.     · Your leg or foot turns cold or changes color.     · You cannot stand or put weight on your knee.     · Your knee looks twisted or bent out of shape.     · You cannot move your knee.     · You have signs of infection, such as:  ? Increased pain, swelling, warmth, or redness. ? Red streaks leading from the knee. ? Pus draining from a place on your knee. ? A fever.     · You have signs of a blood clot in your leg (called a deep vein thrombosis), such as:  ? Pain in your calf, back of the knee, thigh, or groin. ? Redness and swelling in your leg or groin.    Watch closely for changes in your health, and be sure to contact your doctor if:    · You have tingling, weakness, or numbness in your knee.     · You have any new symptoms, such as swelling.     · You have bruises from a knee injury that last longer than 2 weeks.     · You do not get better as expected. Where can you learn more? Go to http://keon-gray.info/. Enter K195 in the search box to learn more about \"Knee Pain or Injury: Care Instructions. \"  Current as of: November 20, 2017  Content Version: 11.8  © 6615-9137 [employer], [employer].  Care instructions adapted under license by [employer] (which disclaims liability or warranty for this information). If you have questions about a medical condition or this instruction, always ask your healthcare professional. [address] any warranty or liability for your use of this information.

## 2018-11-17 NOTE — ED NOTES
I have reviewed discharge instructions with the patient and spouse. The patient and spouse verbalized understanding. Patient left ED via Discharge Method: ambulatory to Home with family Opportunity for questions and clarification provided. Patient given 1 scripts. To continue your aftercare when you leave the hospital, you may receive an automated call from our care team to check in on how you are doing. This is a free service and part of our promise to provide the best care and service to meet your aftercare needs.  If you have questions, or wish to unsubscribe from this service please call [phone]. Thank you for Choosing our [facility] Emergency Department.

## 2018-11-17 NOTE — ED PROVIDER NOTES
Presents with left knee pain. Patient states he twisted his knee while cleaning the bathroom. It's been swollen and painful to walk on. Uses cane. The history is provided by the patient. Ankle Injury This is a new problem. The current episode started more than 2 days ago. The problem occurs constantly. The problem has been gradually worsening. The pain is present in the left knee. The quality of the pain is described as aching and pounding. The pain is moderate. Associated symptoms include stiffness. Pertinent negatives include no numbness. There has been a history of trauma. Past Medical History:  
Diagnosis Date  Anxiety  Dermatophytosis of nail  Diabetes ([facility] [address] 9/12/2014  Diabetic polyneuropathy ([facility] [address]  Heart murmur AORTIC SYSTOLIC FLOW [place]UR[place]UR, [address]  
 Hepatic encephalopathy ([facility] [address] 5/5/2014  Hyperlipidemia  Hypertension  Metatarsal stress fracture  Metatarsalgia  Osteoarthritis  Peripheral edema  Psychiatric disorder   
 depression  UTI (urinary tract infection) Past Surgical History:  
Procedure Laterality Date  HX HERNIA REPAIR  2013  HX LAPAROTOMY  2012 ABDOMINAL SURGERY   
 HX OTHER SURGICAL    
 cyst removed from back x 1 week  NEUROLOGICAL PROCEDURE UNLISTED    
 plate in  head. [place] Family History:  
Problem Relation Age of Onset  Colon Cancer Mother  Stroke Father  Hypertension Sister Social History Socioeconomic History  Marital status:  Spouse name: Not on file  Number of children: Not on file  Years of education: Not on file  Highest education level: Not on file Social Needs  Financial resource strain: Not on file  Food insecurity - worry: Not on file  Food insecurity - inability: Not on file  Transportation needs - medical: Not on file  Transportation needs - non-medical: Not on file Occupational History  Not on file Tobacco Use  
 Smoking status: Current Every Day Smoker  Smokeless tobacco: Never Used  Tobacco comment: LIGHT TOBACCO SMOKER 1-2 A DAY Substance and Sexual Activity  Alcohol use: Yes Alcohol/week: 0.0 oz  
  Comment: large unknown amount daily  Drug use: No  
 Sexual activity: Not Currently Other Topics Concern  Not on file Social History Narrative  Not on file ALLERGIES: Pcn [penicillins] Review of Systems Musculoskeletal: Positive for stiffness. Neurological: Negative for numbness. All other systems reviewed and are negative. Vitals:  
 11/16/18 1850 11/16/18 2051 BP:  (!) 194/121 Pulse: 90 78 Resp: 18 18 Temp: 98 °F (36.7 °C) 98 °F (36.7 °C) SpO2: 98% 97% Weight: 79.4 kg (175 lb) Height: 5' 6\" (1.676 m) Physical Exam  
Constitutional: He is oriented to person, place, and time. He appears well-developed and well-nourished. HENT:  
Head: Normocephalic and atraumatic. Musculoskeletal: Normal range of motion. He exhibits edema and tenderness. He exhibits no deformity. Right knee: He exhibits swelling and effusion. He exhibits normal range of motion, normal alignment, no LCL laxity, normal patellar mobility and no MCL laxity. Tenderness found. Patellar tendon tenderness noted. Neurological: He is alert and oriented to person, place, and time. Nursing note and vitals reviewed. MDM Number of Diagnoses or Management Options Knee strain, left, initial encounter:  
Diagnosis management comments: X-rays negative Knee immobilizer andTramadol to go home with. Amount and/or Complexity of Data Reviewed Tests in the radiology section of CPT®: ordered and reviewed Risk of Complications, Morbidity, and/or Mortality Presenting problems: low Diagnostic procedures: low Management options: low Patient Progress Patient progress: improved Procedures

## 2018-12-12 PROBLEM — E66.3 OVERWEIGHT (BMI 25.0-29.9): Status: ACTIVE | Noted: 2018-12-12

## 2019-01-21 PROBLEM — E11.21 TYPE 2 DIABETES WITH NEPHROPATHY (HCC): Status: ACTIVE | Noted: 2019-01-21

## 2019-02-11 ENCOUNTER — HOSPITAL ENCOUNTER (INPATIENT)
Age: 69
LOS: 2 days | Discharge: HOME HEALTH | DRG: 247 | End: 2019-02-13
Attending: EMERGENCY MEDICINE | Admitting: INTERNAL MEDICINE
Payer: COMMERCIAL

## 2019-02-11 ENCOUNTER — APPOINTMENT (OUTPATIENT)
Dept: GENERAL RADIOLOGY | Age: 69
DRG: 247 | End: 2019-02-11
Attending: EMERGENCY MEDICINE
Payer: COMMERCIAL

## 2019-02-11 DIAGNOSIS — R77.8 ELEVATED TROPONIN: Primary | ICD-10-CM

## 2019-02-11 DIAGNOSIS — I48.91 ATRIAL FIBRILLATION WITH RVR (HCC): ICD-10-CM

## 2019-02-11 PROBLEM — R07.9 CHEST PAIN: Status: ACTIVE | Noted: 2019-02-11

## 2019-02-11 PROBLEM — R11.10 VOMITING: Status: ACTIVE | Noted: 2019-02-11

## 2019-02-11 LAB
ALBUMIN SERPL-MCNC: 4 G/DL (ref 3.2–4.6)
ALBUMIN/GLOB SERPL: 1.1 {RATIO} (ref 1.2–3.5)
ALP SERPL-CCNC: 97 U/L (ref 50–136)
ALT SERPL-CCNC: 27 U/L (ref 12–65)
ANION GAP SERPL CALC-SCNC: 14 MMOL/L (ref 7–16)
AST SERPL-CCNC: 23 U/L (ref 15–37)
ATRIAL RATE: 138 BPM
BASOPHILS # BLD: 0 K/UL (ref 0–0.2)
BASOPHILS NFR BLD: 0 % (ref 0–2)
BILIRUB SERPL-MCNC: 0.8 MG/DL (ref 0.2–1.1)
BUN SERPL-MCNC: 11 MG/DL (ref 8–23)
CALCIUM SERPL-MCNC: 9.3 MG/DL (ref 8.3–10.4)
CALCULATED R AXIS, ECG10: 30 DEGREES
CALCULATED T AXIS, ECG11: -33 DEGREES
CHLORIDE SERPL-SCNC: 103 MMOL/L (ref 98–107)
CO2 SERPL-SCNC: 24 MMOL/L (ref 21–32)
CREAT SERPL-MCNC: 1.57 MG/DL (ref 0.8–1.5)
DIAGNOSIS, 93000: NORMAL
DIFFERENTIAL METHOD BLD: ABNORMAL
EOSINOPHIL # BLD: 0 K/UL (ref 0–0.8)
EOSINOPHIL NFR BLD: 0 % (ref 0.5–7.8)
ERYTHROCYTE [DISTWIDTH] IN BLOOD BY AUTOMATED COUNT: 13.2 % (ref 11.9–14.6)
ETHANOL SERPL-MCNC: <3 MG/DL
GLOBULIN SER CALC-MCNC: 3.7 G/DL (ref 2.3–3.5)
GLUCOSE BLD STRIP.AUTO-MCNC: 141 MG/DL (ref 65–100)
GLUCOSE SERPL-MCNC: 172 MG/DL (ref 65–100)
HCT VFR BLD AUTO: 46.4 % (ref 41.1–50.3)
HGB BLD-MCNC: 16.1 G/DL (ref 13.6–17.2)
IMM GRANULOCYTES # BLD AUTO: 0 K/UL (ref 0–0.5)
IMM GRANULOCYTES NFR BLD AUTO: 0 % (ref 0–5)
LIPASE SERPL-CCNC: 165 U/L (ref 73–393)
LYMPHOCYTES # BLD: 0.5 K/UL (ref 0.5–4.6)
LYMPHOCYTES NFR BLD: 9 % (ref 13–44)
MAGNESIUM SERPL-MCNC: 1.7 MG/DL (ref 1.8–2.4)
MCH RBC QN AUTO: 32.3 PG (ref 26.1–32.9)
MCHC RBC AUTO-ENTMCNC: 34.7 G/DL (ref 31.4–35)
MCV RBC AUTO: 93.2 FL (ref 79.6–97.8)
MONOCYTES # BLD: 0.1 K/UL (ref 0.1–1.3)
MONOCYTES NFR BLD: 2 % (ref 4–12)
NEUTS SEG # BLD: 5.7 K/UL (ref 1.7–8.2)
NEUTS SEG NFR BLD: 89 % (ref 43–78)
NRBC # BLD: 0 K/UL (ref 0–0.2)
PLATELET # BLD AUTO: 167 K/UL (ref 150–450)
PMV BLD AUTO: 10.4 FL (ref 9.4–12.3)
POTASSIUM SERPL-SCNC: 3.5 MMOL/L (ref 3.5–5.1)
PROT SERPL-MCNC: 7.7 G/DL (ref 6.3–8.2)
Q-T INTERVAL, ECG07: 316 MS
QRS DURATION, ECG06: 114 MS
QTC CALCULATION (BEZET), ECG08: 442 MS
RBC # BLD AUTO: 4.98 M/UL (ref 4.23–5.6)
SODIUM SERPL-SCNC: 141 MMOL/L (ref 136–145)
TROPONIN I SERPL-MCNC: 0.71 NG/ML (ref 0.02–0.05)
TROPONIN I SERPL-MCNC: 5.4 NG/ML (ref 0.02–0.05)
TSH SERPL DL<=0.005 MIU/L-ACNC: 0.86 UIU/ML (ref 0.36–3.74)
VENTRICULAR RATE, ECG03: 118 BPM
WBC # BLD AUTO: 6.4 K/UL (ref 4.3–11.1)

## 2019-02-11 PROCEDURE — 74011250637 HC RX REV CODE- 250/637: Performed by: PHYSICIAN ASSISTANT

## 2019-02-11 PROCEDURE — 94760 N-INVAS EAR/PLS OXIMETRY 1: CPT

## 2019-02-11 PROCEDURE — 93005 ELECTROCARDIOGRAM TRACING: CPT | Performed by: EMERGENCY MEDICINE

## 2019-02-11 PROCEDURE — 80307 DRUG TEST PRSMV CHEM ANLYZR: CPT

## 2019-02-11 PROCEDURE — 36415 COLL VENOUS BLD VENIPUNCTURE: CPT

## 2019-02-11 PROCEDURE — 80053 COMPREHEN METABOLIC PANEL: CPT

## 2019-02-11 PROCEDURE — 74011250637 HC RX REV CODE- 250/637: Performed by: INTERNAL MEDICINE

## 2019-02-11 PROCEDURE — 96374 THER/PROPH/DIAG INJ IV PUSH: CPT | Performed by: EMERGENCY MEDICINE

## 2019-02-11 PROCEDURE — 99285 EMERGENCY DEPT VISIT HI MDM: CPT | Performed by: EMERGENCY MEDICINE

## 2019-02-11 PROCEDURE — 82962 GLUCOSE BLOOD TEST: CPT

## 2019-02-11 PROCEDURE — 74011250636 HC RX REV CODE- 250/636: Performed by: NURSE PRACTITIONER

## 2019-02-11 PROCEDURE — 83735 ASSAY OF MAGNESIUM: CPT

## 2019-02-11 PROCEDURE — 74011000250 HC RX REV CODE- 250: Performed by: EMERGENCY MEDICINE

## 2019-02-11 PROCEDURE — 83690 ASSAY OF LIPASE: CPT

## 2019-02-11 PROCEDURE — 84484 ASSAY OF TROPONIN QUANT: CPT

## 2019-02-11 PROCEDURE — 65660000000 HC RM CCU STEPDOWN

## 2019-02-11 PROCEDURE — 74011250637 HC RX REV CODE- 250/637: Performed by: NURSE PRACTITIONER

## 2019-02-11 PROCEDURE — 85025 COMPLETE CBC W/AUTO DIFF WBC: CPT

## 2019-02-11 PROCEDURE — 71045 X-RAY EXAM CHEST 1 VIEW: CPT

## 2019-02-11 PROCEDURE — 84443 ASSAY THYROID STIM HORMONE: CPT

## 2019-02-11 RX ORDER — INSULIN LISPRO 100 [IU]/ML
0-10 INJECTION, SOLUTION INTRAVENOUS; SUBCUTANEOUS
Status: DISCONTINUED | OUTPATIENT
Start: 2019-02-11 | End: 2019-02-13 | Stop reason: HOSPADM

## 2019-02-11 RX ORDER — FAMOTIDINE 20 MG/1
20 TABLET, FILM COATED ORAL EVERY EVENING
Status: DISCONTINUED | OUTPATIENT
Start: 2019-02-12 | End: 2019-02-11

## 2019-02-11 RX ORDER — DOCUSATE SODIUM 100 MG/1
100 CAPSULE, LIQUID FILLED ORAL 2 TIMES DAILY
COMMUNITY

## 2019-02-11 RX ORDER — SODIUM CHLORIDE 0.9 % (FLUSH) 0.9 %
5-40 SYRINGE (ML) INJECTION AS NEEDED
Status: DISCONTINUED | OUTPATIENT
Start: 2019-02-11 | End: 2019-02-13 | Stop reason: HOSPADM

## 2019-02-11 RX ORDER — FAMOTIDINE 20 MG/1
20 TABLET, FILM COATED ORAL 2 TIMES DAILY
Status: DISCONTINUED | OUTPATIENT
Start: 2019-02-11 | End: 2019-02-11

## 2019-02-11 RX ORDER — DILTIAZEM HYDROCHLORIDE 5 MG/ML
20 INJECTION INTRAVENOUS ONCE
Status: COMPLETED | OUTPATIENT
Start: 2019-02-11 | End: 2019-02-11

## 2019-02-11 RX ORDER — METOPROLOL TARTRATE 25 MG/1
25 TABLET, FILM COATED ORAL EVERY 6 HOURS
Status: DISCONTINUED | OUTPATIENT
Start: 2019-02-11 | End: 2019-02-13

## 2019-02-11 RX ORDER — NITROGLYCERIN 0.4 MG/1
0.4 TABLET SUBLINGUAL
Status: DISCONTINUED | OUTPATIENT
Start: 2019-02-11 | End: 2019-02-12 | Stop reason: SDUPTHER

## 2019-02-11 RX ORDER — DILTIAZEM HYDROCHLORIDE 30 MG/1
30 TABLET, FILM COATED ORAL
Status: DISCONTINUED | OUTPATIENT
Start: 2019-02-11 | End: 2019-02-13

## 2019-02-11 RX ORDER — PREGABALIN 50 MG/1
50 CAPSULE ORAL 3 TIMES DAILY
Status: DISCONTINUED | OUTPATIENT
Start: 2019-02-11 | End: 2019-02-13 | Stop reason: HOSPADM

## 2019-02-11 RX ORDER — PRAVASTATIN SODIUM 20 MG/1
40 TABLET ORAL
Status: DISCONTINUED | OUTPATIENT
Start: 2019-02-11 | End: 2019-02-12

## 2019-02-11 RX ORDER — TRAMADOL HYDROCHLORIDE 50 MG/1
50 TABLET ORAL
Status: DISCONTINUED | OUTPATIENT
Start: 2019-02-11 | End: 2019-02-13 | Stop reason: HOSPADM

## 2019-02-11 RX ORDER — FAMOTIDINE 20 MG/1
20 TABLET, FILM COATED ORAL EVERY EVENING
Status: DISCONTINUED | OUTPATIENT
Start: 2019-02-11 | End: 2019-02-13 | Stop reason: HOSPADM

## 2019-02-11 RX ORDER — HEPARIN SODIUM 5000 [USP'U]/100ML
12-25 INJECTION, SOLUTION INTRAVENOUS
Status: DISCONTINUED | OUTPATIENT
Start: 2019-02-11 | End: 2019-02-12

## 2019-02-11 RX ORDER — ONDANSETRON 2 MG/ML
4 INJECTION INTRAMUSCULAR; INTRAVENOUS
Status: DISCONTINUED | OUTPATIENT
Start: 2019-02-11 | End: 2019-02-13 | Stop reason: HOSPADM

## 2019-02-11 RX ORDER — SODIUM CHLORIDE 0.9 % (FLUSH) 0.9 %
5-40 SYRINGE (ML) INJECTION EVERY 8 HOURS
Status: DISCONTINUED | OUTPATIENT
Start: 2019-02-11 | End: 2019-02-13 | Stop reason: HOSPADM

## 2019-02-11 RX ORDER — HEPARIN SODIUM 5000 [USP'U]/ML
4000 INJECTION, SOLUTION INTRAVENOUS; SUBCUTANEOUS ONCE
Status: COMPLETED | OUTPATIENT
Start: 2019-02-11 | End: 2019-02-11

## 2019-02-11 RX ORDER — ASPIRIN 81 MG/1
81 TABLET ORAL DAILY
Status: DISCONTINUED | OUTPATIENT
Start: 2019-02-12 | End: 2019-02-13

## 2019-02-11 RX ORDER — LOSARTAN POTASSIUM 50 MG/1
50 TABLET ORAL DAILY
Status: DISCONTINUED | OUTPATIENT
Start: 2019-02-12 | End: 2019-02-13 | Stop reason: HOSPADM

## 2019-02-11 RX ADMIN — DILTIAZEM HYDROCHLORIDE 30 MG: 30 TABLET, FILM COATED ORAL at 22:34

## 2019-02-11 RX ADMIN — PREGABALIN 50 MG: 50 CAPSULE ORAL at 22:34

## 2019-02-11 RX ADMIN — Medication 5 ML: at 18:36

## 2019-02-11 RX ADMIN — HEPARIN SODIUM 4000 UNITS: 5000 INJECTION INTRAVENOUS; SUBCUTANEOUS at 18:29

## 2019-02-11 RX ADMIN — DILTIAZEM HYDROCHLORIDE 30 MG: 30 TABLET, FILM COATED ORAL at 18:51

## 2019-02-11 RX ADMIN — Medication 10 ML: at 22:35

## 2019-02-11 RX ADMIN — PRAVASTATIN SODIUM 40 MG: 20 TABLET ORAL at 22:34

## 2019-02-11 RX ADMIN — METOPROLOL TARTRATE 25 MG: 25 TABLET ORAL at 18:51

## 2019-02-11 RX ADMIN — DILTIAZEM HYDROCHLORIDE 20 MG: 5 INJECTION INTRAVENOUS at 14:36

## 2019-02-11 RX ADMIN — HEPARIN SODIUM AND DEXTROSE 12 UNITS/KG/HR: 5000; 5 INJECTION INTRAVENOUS at 18:30

## 2019-02-11 RX ADMIN — FAMOTIDINE 20 MG: 20 TABLET ORAL at 18:51

## 2019-02-11 RX ADMIN — TRAMADOL HYDROCHLORIDE 50 MG: 50 TABLET, FILM COATED ORAL at 18:51

## 2019-02-11 NOTE — PROGRESS NOTES
Danielle Bobby, RN reporting Pt admitted with AF with RVR and was given Cardizem 20 mg IV bolus in ER hours ago and HR still fairly controlled 90- low 100's. Cardizem drip was ordered but never started in ER. Will not start drip now with fairly controlled rate. Will add BB and PO Cardizem and monitor.  
 
Farrah Sanchez PA-C

## 2019-02-11 NOTE — PROGRESS NOTES
TRANSFER - IN REPORT: 
 
[facility] for routine progression of care Report consisted of patients Situation, Background, Assessment and Recommendations(SBAR). Information from the following report(s) SBAR, Kardex, [facility] and Recent Results was reviewed with the receiving nurse. Opportunity for questions and clarification was provided. Assessment completed upon patients arrival to unit and care assumed.

## 2019-02-11 NOTE — ED TRIAGE NOTES
PT TO ED VIA EMS/STRETCHER FROM HOME WITH CHEST PAIN, HYPERTENSION, AND VOMITING - PER EMS PT HAD IRREGULAR PULSE UPON THEIR ARRIVAL - POSSIBLY NEW ONSET A. FIB - ASA AND NITRO GIVEN BY EMS - PT REPORTS THAT NITRO MADE HIS CHEST PAIN WORSE - PT ALERT AND ORIENTED X3 - PT DENIES ANY RECENT COUGH OR COLD

## 2019-02-11 NOTE — H&P
[facility] Cardiology History & Physical  
  
Date of  Admission: 2/11/2019  1:20 PM  
 
Primary Care Physician: Dr. Gamal Jade 
Primary Cardiologist: none Admitting Physician: Dr. Kyaw Reagan CC: chest pain, HTN,  vomiting and EKG showing A fib  
 
HPI:  Corene Dance is a 80 y.o. male presented to the ED c/o vomiting that started yesterday with also some chest soreness/pressure. Unable to scale chest pain but states it lasts hours at times and worse with exertion. He reports some heart racing over last several days. When  EMS took pulse it was  irregular pulse upon arrival. He has a PMH of DM, Aortic Murmur, hepatic encephalopathy, hyperlipidemia, HTN, and depression. Prior smoking and ETOH abuse and quit both last year. In ED, EKG showed A Fib with RVR, incomplete RBBB, and NSST changes Previous EKGs did not show A. Fib. Troponin is 0.71. Cardizem IV bolus given in ED. Currently with chest soreness scale as \" a little\" and his vomiting has resolved. Past Medical History:  
Diagnosis Date  Anxiety  Dermatophytosis of nail  Diabetes ([facility] [address] 9/12/2014  Diabetic polyneuropathy ([facility] [address]  Heart murmur AORTIC SYSTOLIC FLOW MURMUR, [address]  
 Hepatic encephalopathy ([facility] [address] 5/5/2014  Hyperlipidemia  Hypertension  Metatarsal stress fracture  Metatarsalgia  Osteoarthritis  Peripheral edema  Psychiatric disorder   
 depression  UTI (urinary tract infection) Past Surgical History:  
Procedure Laterality Date  HX HERNIA REPAIR  2013  HX LAPAROTOMY  2012 ABDOMINAL SURGERY   
 HX OTHER SURGICAL    
 cyst removed from back x 1 week  NEUROLOGICAL PROCEDURE UNLISTED    
 plate in  head. Kimberly Kapoor Allergies Allergen Reactions  Pcn [Penicillins] Itching *PCN-200, ASSORTED CLASSES* Social History Socioeconomic History  Marital status:  Spouse name: Not on file  Number of children: Not on file  Years of education: Not on file  Highest education level: Not on file Social Needs  Financial resource strain: Not on file  Food insecurity - worry: Not on file  Food insecurity - inability: Not on file  Transportation needs - medical: Not on file  Transportation needs - non-medical: Not on file Occupational History  Not on file Tobacco Use  Smoking status: Current Every Day Smoker  Smokeless tobacco: Never Used  Tobacco comment: LIGHT TOBACCO SMOKER 1-2 A DAY Substance and Sexual Activity  Alcohol use: No  
  Alcohol/week: 0.0 oz Frequency: Never  Drug use: No  
 Sexual activity: Not Currently Other Topics Concern  Not on file Social History Narrative  Not on file Family History Problem Relation Age of Onset  Colon Cancer Mother  Stroke Father  Hypertension Sister No current facility-administered medications for this encounter. Current Outpatient Medications Medication Sig  
 multivitamin (ONE A DAY) tablet Take 1 Tab by mouth daily.  traMADol (ULTRAM) 50 mg tablet Take 50 mg by mouth every six (6) hours as needed for Pain.  aspirin delayed-release 81 mg tablet Take  by mouth daily.  amLODIPine (NORVASC) 5 mg tablet Take 1 Tab by mouth daily.  losartan (COZAAR) 50 mg tablet TAKE 1 TABLET BY MOUTH EVERY DAY  raNITIdine (ZANTAC) 150 mg tablet Take 1 Tab by mouth two (2) times a day.  pregabalin (LYRICA) 50 mg capsule Take 1 Cap by mouth three (3) times daily. Max Daily Amount: 150 mg.  polyethylene glycol (MIRALAX) 17 gram/dose powder Take 17 g by mouth daily. 1 tablespoon with 8 oz of water daily  lactulose (KRISTALOSE) 10 gram packet Take 1 Packet by mouth three (3) times daily.  metFORMIN (GLUCOPHAGE) 500 mg tablet Take 1 Tab by mouth two (2) times a day.  pravastatin (PRAVACHOL) 40 mg tablet Take 1 Tab by mouth nightly.  gabapentin (NEURONTIN) 100 mg capsule Take  by mouth three (3) times daily.   
 
 
Review of Systems Review of Systems Constitution: Negative for diaphoresis, weakness, malaise/fatigue, weight gain and weight loss. HENT: Negative for congestion. Cardiovascular: Positive for chest pain, irregular heartbeat and palpitations. Negative for claudication, cyanosis, dyspnea on exertion, leg swelling, near-syncope, orthopnea, paroxysmal nocturnal dyspnea and syncope. Respiratory: Negative for cough, shortness of breath, sputum production and wheezing. Hematologic/Lymphatic: Negative for bleeding problem. Does not bruise/bleed easily. Skin: Negative for nail changes. Gastrointestinal: Positive for abdominal pain (soreness from vomiting ) and vomiting. Negative for bloating, heartburn, hematemesis, hematochezia and melena. Genitourinary: Negative for hematuria. Neurological: Negative for dizziness and headaches. Psychiatric/Behavioral: Negative for altered mental status. Subjective:  
 
Visit Vitals /76 Pulse 94 Temp 97.8 °F (36.6 °C) Resp 20 Ht 5' 6\" (1.676 m) Wt 79.4 kg (175 lb) SpO2 96% BMI 28.25 kg/m² No intake/output data recorded. No intake/output data recorded. Physical Exam: 
General: Well Developed, Well Nourished, No Acute Distress HEENT: pupils equal and round, no abnormalities noted Neck: supple, no JVD, no carotid bruits Heart: irregular Lungs: Clear throughout auscultation bilaterally without adventitious sounds Abd: soft, nontender, nondistended, with good bowel sounds Ext: warm, no edema, calves supple/nontender, pulses 2+ bilaterally Skin: warm and dry Psychiatric: Normal mood and affect Neurologic: Alert and oriented X 3 Cardiographics Telemetry: AFIB 
ECG: A fib with RVR, NSST changes Echocardiogram: ordered Labs:  
Recent Results (from the past 24 hour(s)) EKG, 12 LEAD, INITIAL Collection Time: 02/11/19  1:28 PM  
Result Value Ref Range  Ventricular Rate 118 BPM  
 Atrial Rate 138 BPM  
 QRS Duration 114 ms Q-T Interval 316 ms  
 QTC Calculation (Bezet) 442 ms Calculated R Axis 30 degrees Calculated T Axis -33 degrees Diagnosis    
  !! AGE AND GENDER SPECIFIC ECG ANALYSIS !! Atrial fibrillation with rapid ventricular response with premature  
ventricular or aberrantly conducted complexes Incomplete right bundle branch block Voltage criteria for left ventricular hypertrophy ST & T wave abnormality, consider inferior ischemia or digitalis effect Abnormal ECG When compared with ECG of 12-JUL-2018 03:53, Atrial fibrillation has replaced Sinus rhythm ST now depressed in Anterolateral leads T wave inversion more evident in Inferior leads Nonspecific T wave abnormality, improved in Anterolateral leads METABOLIC PANEL, COMPREHENSIVE Collection Time: 02/11/19  1:29 PM  
Result Value Ref Range Sodium 141 136 - 145 mmol/L Potassium 3.5 3.5 - 5.1 mmol/L Chloride 103 98 - 107 mmol/L  
 CO2 24 21 - 32 mmol/L Anion gap 14 7 - 16 mmol/L Glucose 172 (H) 65 - 100 mg/dL BUN 11 8 - 23 MG/DL Creatinine 1.57 (H) 0.8 - 1.5 MG/DL  
 GFR est AA 57 (L) >60 ml/min/1.73m2 GFR est non-AA 47 (L) >60 ml/min/1.73m2 Calcium 9.3 8.3 - 10.4 MG/DL Bilirubin, total 0.8 0.2 - 1.1 MG/DL  
 ALT (SGPT) 27 12 - 65 U/L  
 AST (SGOT) 23 15 - 37 U/L Alk. phosphatase 97 50 - 136 U/L Protein, total 7.7 6.3 - 8.2 g/dL Albumin 4.0 3.2 - 4.6 g/dL Globulin 3.7 (H) 2.3 - 3.5 g/dL A-G Ratio 1.1 (L) 1.2 - 3.5    
CBC WITH AUTOMATED DIFF Collection Time: 02/11/19  1:29 PM  
Result Value Ref Range WBC 6.4 4.3 - 11.1 K/uL  
 RBC 4.98 4.23 - 5.6 M/uL  
 HGB 16.1 13.6 - 17.2 g/dL HCT 46.4 41.1 - 50.3 % MCV 93.2 79.6 - 97.8 FL  
 MCH 32.3 26.1 - 32.9 PG  
 MCHC 34.7 31.4 - 35.0 g/dL  
 RDW 13.2 11.9 - 14.6 % PLATELET 632 554 - 761 K/uL MPV 10.4 9.4 - 12.3 FL ABSOLUTE NRBC 0.00 0.0 - 0.2 K/uL  
 DF AUTOMATED NEUTROPHILS 89 (H) 43 - 78 % LYMPHOCYTES 9 (L) 13 - 44 %  MONOCYTES 2 (L) 4.0 - 12.0 % EOSINOPHILS 0 (L) 0.5 - 7.8 % BASOPHILS 0 0.0 - 2.0 % IMMATURE GRANULOCYTES 0 0.0 - 5.0 %  
 ABS. NEUTROPHILS 5.7 1.7 - 8.2 K/UL  
 ABS. LYMPHOCYTES 0.5 0.5 - 4.6 K/UL  
 ABS. MONOCYTES 0.1 0.1 - 1.3 K/UL  
 ABS. EOSINOPHILS 0.0 0.0 - 0.8 K/UL  
 ABS. BASOPHILS 0.0 0.0 - 0.2 K/UL  
 ABS. IMM. GRANS. 0.0 0.0 - 0.5 K/UL  
TROPONIN I Collection Time: 02/11/19  1:29 PM  
Result Value Ref Range Troponin-I, Qt. 0.71 (HH) 0.02 - 0.05 NG/ML  
LIPASE Collection Time: 02/11/19  1:29 PM  
Result Value Ref Range Lipase 165 73 - 393 U/L MAGNESIUM Collection Time: 02/11/19  1:29 PM  
Result Value Ref Range Magnesium 1.7 (L) 1.8 - 2.4 mg/dL ETHYL ALCOHOL Collection Time: 02/11/19  1:29 PM  
Result Value Ref Range ALCOHOL(ETHYL),SERUM <3 MG/DL  
TSH 3RD GENERATION Collection Time: 02/11/19  1:29 PM  
Result Value Ref Range TSH 0.861 0.358 - 3.740 uIU/mL Patient has been seen and examined by Dr. Ben Wallis and he agrees with the following assessment and plan: 
 
 Assessment/Plan:  
  
 Principal Problem: 
  Atrial fibrillation with RVR ([facility] [address] (2/11/2019)- will admit to tele; start cardizem gtt and heparin gtt. Will need to consider NOAC prior to d/c. Will order echo. Further recommendations pending clinical course. Atrial Fibrillation CHADSVASC2 Score Stroke Risk:  
80 y.o. 72 to 76  +1  
male Male     +0  
CHF HX: No    + 0 HTN HX: Yes    +1 Stroke/TIA/Thromboembolism No    +0 Vascular Disease HX: No    + 0 Diabetes Mellitus Yes    +1 CHADSVASC 2 Score 3      Annual Stroke Risk 3.2% - moderate-high Active Problems: 
  Diabetes ([facility] [address] (9/12/2014)-hold metformin in case we need to do a heart cath and start SSI. HTN (hypertension) (9/12/2014)- controlled but will stop norvasc to allow more B/P room Hyperlipidemia ()-continue pravastatin Chest pain (2/11/2019)- more chest soreness from vomiting but also some heaviness at times.  Will check serial troponins, echo. Continue ASA, ARB and statin. Will consider adding BB when RVR resolved. Vomiting (2/11/2019)- will treat PRN currently resolved Elevated troponin (2/11/2019)- likely demand ischemia secondary to RVR but will trend troponins, check echo, starting heparin. Continue home med ARB. May need invasive w/u pending clinical course.   
 
 
Yadira Godwin NP 
2/11/2019 2:50 PM

## 2019-02-11 NOTE — ROUTINE PROCESS
TRANSFER - OUT REPORT: 
 
Verbal report given to Piedad Choi RN on Jaden Flores  being transferred to [phone] for routine progression of care Report consisted of patients Situation, Background, Assessment and  
Recommendations(SBAR). Information from the following report(s) SBAR was reviewed with the receiving nurse. Lines:  
Peripheral IV 02/11/19 Left Antecubital (Active) Site Assessment Clean, dry, & intact 2/11/2019  2:50 PM  
Phlebitis Assessment 0 2/11/2019  2:50 PM  
Infiltration Assessment 0 2/11/2019  2:50 PM  
Dressing Status Clean, dry, & intact 2/11/2019  2:50 PM  
  
 
Opportunity for questions and clarification was provided. Patient transported with: 
 Monitor Registered Nurse

## 2019-02-11 NOTE — ED PROVIDER NOTES
60-year-old male presents to ED via EMS from home with complaint of substernal chest pain without radiation and palpitations since last evening. According to EMS patient with A. Fib with RVR. Patient denies history of atrial fibrillation. Patient given an aspirin and nitroglycerin by EMS. Patient denies fever, chills, productive cough, dizziness, weakness, leg swelling, hemoptysis. Patient poor historian. Denies history of previous MI. Denies any recent alcohol or illicit drug use. The history is provided by the patient. No  was used. Past Medical History:  
Diagnosis Date  Anxiety  Dermatophytosis of nail  Diabetes ([facility] [address] 9/12/2014  Diabetic polyneuropathy ([facility] [address]  Heart murmur AORTIC SYSTOLIC FLOW MURMUR, [address]  
 Hepatic encephalopathy ([facility] [address] 5/5/2014  Hyperlipidemia  Hypertension  Metatarsal stress fracture  Metatarsalgia  Osteoarthritis  Peripheral edema  Psychiatric disorder   
 depression  UTI (urinary tract infection) Past Surgical History:  
Procedure Laterality Date  HX HERNIA REPAIR  2013  HX LAPAROTOMY  2012 ABDOMINAL SURGERY   
 HX OTHER SURGICAL    
 cyst removed from back x 1 week  NEUROLOGICAL PROCEDURE UNLISTED    
 plate in  head. Owen Carr Family History:  
Problem Relation Age of Onset  Colon Cancer Mother  Stroke Father  Hypertension Sister Social History Socioeconomic History  Marital status:  Spouse name: Not on file  Number of children: Not on file  Years of education: Not on file  Highest education level: Not on file Social Needs  Financial resource strain: Not on file  Food insecurity - worry: Not on file  Food insecurity - inability: Not on file  Transportation needs - medical: Not on file  Transportation needs - non-medical: Not on file Occupational History  Not on file Tobacco Use  Smoking status: Current Every Day Smoker  Smokeless tobacco: Never Used  Tobacco comment: LIGHT TOBACCO SMOKER 1-2 A DAY Substance and Sexual Activity  Alcohol use: No  
  Alcohol/week: 0.0 oz Frequency: Never  Drug use: No  
 Sexual activity: Not Currently Other Topics Concern  Not on file Social History Narrative  Not on file ALLERGIES: Pcn [penicillins] Review of Systems Constitutional: Negative for chills, fatigue and fever. Respiratory: Negative for cough and shortness of breath. Cardiovascular: Positive for chest pain and palpitations. Gastrointestinal: Positive for nausea. Negative for abdominal pain, constipation and vomiting. Genitourinary: Negative for dysuria and flank pain. Musculoskeletal: Negative for myalgias. Skin: Negative for rash. Neurological: Negative for dizziness, weakness, light-headedness and numbness. Psychiatric/Behavioral: Negative for confusion. Vitals:  
 02/11/19 1326 BP: 136/88 Pulse: (!) 119 Resp: 20 Temp: 97.8 °F (36.6 °C) SpO2: 98% Weight: 79.4 kg (175 lb) Height: 5' 6\" (1.676 m) Physical Exam  
Constitutional: He is oriented to person, place, and time. He appears well-developed. Patient well-appearing and in no acute distress. HENT:  
MMM. Cardiovascular: Normal heart sounds. Tachycardic. Irregularly irregular. Radial pulses 2+ and equal bilaterally. Pulmonary/Chest:  
CTAB. No wheezes, rhonchi, or rales. Abdominal:  
Soft, NTND. Musculoskeletal: Normal range of motion. He exhibits no edema. No LE edema or calf TTP. Neurological: He is alert and oriented to person, place, and time. No cranial nerve deficit or sensory deficit. Strength 5/5 throughout. Normal sensory exam.    
Skin: Skin is warm and dry. No rash. Psychiatric: He has a normal mood and affect. Nursing note and vitals reviewed. MDM Number of Diagnoses or Management Options Atrial fibrillation with RVR ([address]): new and requires workup Elevated troponin: new and requires workup Diagnosis management comments: Patient with new onset A. Fib with RVR. Trop elevated at 0.71. Remainder of labs otherwise normal. 
Patient given diltiazem 20 mg IV. Cards consulted. Cards to admit. Amount and/or Complexity of Data Reviewed Clinical lab tests: ordered and reviewed Tests in the radiology section of CPT®: reviewed and ordered Tests in the medicine section of CPT®: ordered Review and summarize past medical records: yes Discuss the patient with other providers: yes Independent visualization of images, tracings, or specimens: yes Risk of Complications, Morbidity, and/or Mortality Presenting problems: moderate Diagnostic procedures: moderate Management options: moderate Patient Progress Patient progress: stable ED Course as of Feb 11 1424 Mon Feb 11, 2019  
1358 CXR IMPRESSION: No acute cardiopulmonary process Severe arthritic changes of the right shoulder joint  [DF] ED Course User Index 
[DF] Murtaza Sanz MD  
 
 
EKG Date/Time: 2/11/2019 2:26 PM 
Performed by: Murtaza Sanz MD 
Authorized by: Murtaza Sanz MD  
 
ECG reviewed by ED Physician in the absence of a cardiologist: yes Rate:  
  ECG rate:  118 ECG rate assessment: tachycardic Rhythm:  
  Rhythm: atrial fibrillation Ectopy:  
  Ectopy: none QRS:  
  QRS axis:  Normal 
  QRS intervals:  Normal 
Conduction:  
  Conduction: normal   
ST segments: ST segments:  Normal 
T waves:  
  T waves: normal   
 
 
 
 
Results Include: 
 
Recent Results (from the past 24 hour(s)) EKG, 12 LEAD, INITIAL Collection Time: 02/11/19  1:28 PM  
Result Value Ref Range Ventricular Rate 118 BPM  
 Atrial Rate 138 BPM  
 QRS Duration 114 ms Q-T Interval 316 ms  
 QTC Calculation (Bezet) 442 ms Calculated R Axis 30 degrees Calculated T Axis -33 degrees  Diagnosis    
  !! AGE AND GENDER SPECIFIC ECG ANALYSIS !! Atrial fibrillation with rapid ventricular response with premature  
ventricular or aberrantly conducted complexes Incomplete right bundle branch block Voltage criteria for left ventricular hypertrophy Nonspecific ST abnormality Abnormal ECG When compared with ECG of 12-JUL-2018 03:53, Atrial fibrillation has replaced Sinus rhythm ST now depressed in Anterolateral leads T wave inversion more evident in Inferior leads Nonspecific T wave abnormality, improved in Anterolateral leads Confirmed by [facility]  MD ()EASTON ([id number]) on 2/11/2019 2:65:64 PM 
  
METABOLIC PANEL, COMPREHENSIVE Collection Time: 02/11/19  1:29 PM  
Result Value Ref Range Sodium 141 136 - 145 mmol/L Potassium 3.5 3.5 - 5.1 mmol/L Chloride 103 98 - 107 mmol/L  
 CO2 24 21 - 32 mmol/L Anion gap 14 7 - 16 mmol/L Glucose 172 (H) 65 - 100 mg/dL BUN 11 8 - 23 MG/DL Creatinine 1.57 (H) 0.8 - 1.5 MG/DL  
 GFR est AA 57 (L) >60 ml/min/1.73m2 GFR est non-AA 47 (L) >60 ml/min/1.73m2 Calcium 9.3 8.3 - 10.4 MG/DL Bilirubin, total 0.8 0.2 - 1.1 MG/DL  
 ALT (SGPT) 27 12 - 65 U/L  
 AST (SGOT) 23 15 - 37 U/L Alk. phosphatase 97 50 - 136 U/L Protein, total 7.7 6.3 - 8.2 g/dL Albumin 4.0 3.2 - 4.6 g/dL Globulin 3.7 (H) 2.3 - 3.5 g/dL A-G Ratio 1.1 (L) 1.2 - 3.5    
CBC WITH AUTOMATED DIFF Collection Time: 02/11/19  1:29 PM  
Result Value Ref Range WBC 6.4 4.3 - 11.1 K/uL  
 RBC 4.98 4.23 - 5.6 M/uL  
 HGB 16.1 13.6 - 17.2 g/dL HCT 46.4 41.1 - 50.3 % MCV 93.2 79.6 - 97.8 FL  
 MCH 32.3 26.1 - 32.9 PG  
 MCHC 34.7 31.4 - 35.0 g/dL  
 RDW 13.2 11.9 - 14.6 % PLATELET 838 071 - 123 K/uL MPV 10.4 9.4 - 12.3 FL ABSOLUTE NRBC 0.00 0.0 - 0.2 K/uL  
 DF AUTOMATED NEUTROPHILS 89 (H) 43 - 78 % LYMPHOCYTES 9 (L) 13 - 44 % MONOCYTES 2 (L) 4.0 - 12.0 % EOSINOPHILS 0 (L) 0.5 - 7.8 % BASOPHILS 0 0.0 - 2.0 % IMMATURE GRANULOCYTES 0 0.0 - 5.0 %  
 ABS. NEUTROPHILS 5.7 1.7 - 8.2 K/UL  
 ABS. LYMPHOCYTES 0.5 0.5 - 4.6 K/UL  
 ABS. MONOCYTES 0.1 0.1 - 1.3 K/UL  
 ABS. EOSINOPHILS 0.0 0.0 - 0.8 K/UL  
 ABS. BASOPHILS 0.0 0.0 - 0.2 K/UL  
 ABS. IMM. GRANS. 0.0 0.0 - 0.5 K/UL  
TROPONIN I Collection Time: 02/11/19  1:29 PM  
Result Value Ref Range Troponin-I, Qt. 0.71 (HH) 0.02 - 0.05 NG/ML  
LIPASE Collection Time: 02/11/19  1:29 PM  
Result Value Ref Range Lipase 165 73 - 393 U/L MAGNESIUM Collection Time: 02/11/19  1:29 PM  
Result Value Ref Range Magnesium 1.7 (L) 1.8 - 2.4 mg/dL ETHYL ALCOHOL Collection Time: 02/11/19  1:29 PM  
Result Value Ref Range ALCOHOL(ETHYL),SERUM <3 MG/DL  
TSH 3RD GENERATION Collection Time: 02/11/19  1:29 PM  
Result Value Ref Range TSH 0.861 0.358 - 3.740 uIU/mL Jacqueline Brooks MD; 2/11/2019 @2:25 PM Voice dictation software was used during the making of this note. This software is not perfect and grammatical and other typographical errors may be present.   This note has not been proofread for errors. 
===================================================================

## 2019-02-12 PROBLEM — I21.4 NSTEMI (NON-ST ELEVATED MYOCARDIAL INFARCTION) (HCC): Status: ACTIVE | Noted: 2019-02-12

## 2019-02-12 PROBLEM — I21.4 NSTEMI (NON-ST ELEVATED MYOCARDIAL INFARCTION) (HCC): Status: RESOLVED | Noted: 2019-02-12 | Resolved: 2019-02-12

## 2019-02-12 LAB
ACT BLD: 367 SECS (ref 70–128)
ANION GAP SERPL CALC-SCNC: 12 MMOL/L (ref 7–16)
APTT PPP: 132.3 SEC (ref 24.7–39.8)
ATRIAL RATE: 122 BPM
BUN SERPL-MCNC: 15 MG/DL (ref 8–23)
CALCIUM SERPL-MCNC: 8.8 MG/DL (ref 8.3–10.4)
CALCULATED R AXIS, ECG10: 47 DEGREES
CALCULATED T AXIS, ECG11: -50 DEGREES
CHLORIDE SERPL-SCNC: 105 MMOL/L (ref 98–107)
CHOLEST SERPL-MCNC: 231 MG/DL
CO2 SERPL-SCNC: 23 MMOL/L (ref 21–32)
CREAT SERPL-MCNC: 1.38 MG/DL (ref 0.8–1.5)
DIAGNOSIS, 93000: NORMAL
ERYTHROCYTE [DISTWIDTH] IN BLOOD BY AUTOMATED COUNT: 13.5 % (ref 11.9–14.6)
GLUCOSE BLD STRIP.AUTO-MCNC: 110 MG/DL (ref 65–100)
GLUCOSE BLD STRIP.AUTO-MCNC: 112 MG/DL (ref 65–100)
GLUCOSE BLD STRIP.AUTO-MCNC: 124 MG/DL (ref 65–100)
GLUCOSE BLD STRIP.AUTO-MCNC: 131 MG/DL (ref 65–100)
GLUCOSE SERPL-MCNC: 115 MG/DL (ref 65–100)
HCT VFR BLD AUTO: 47.3 % (ref 41.1–50.3)
HDLC SERPL-MCNC: 56 MG/DL (ref 40–60)
HDLC SERPL: 4.1 {RATIO}
HGB BLD-MCNC: 16.7 G/DL (ref 13.6–17.2)
LDLC SERPL CALC-MCNC: 157.4 MG/DL
LIPID PROFILE,FLP: ABNORMAL
MCH RBC QN AUTO: 32.7 PG (ref 26.1–32.9)
MCHC RBC AUTO-ENTMCNC: 35.3 G/DL (ref 31.4–35)
MCV RBC AUTO: 92.6 FL (ref 79.6–97.8)
NRBC # BLD: 0 K/UL (ref 0–0.2)
PLATELET # BLD AUTO: 173 K/UL (ref 150–450)
PMV BLD AUTO: 10.8 FL (ref 9.4–12.3)
POTASSIUM SERPL-SCNC: 3.5 MMOL/L (ref 3.5–5.1)
Q-T INTERVAL, ECG07: 366 MS
QRS DURATION, ECG06: 100 MS
QTC CALCULATION (BEZET), ECG08: 411 MS
RBC # BLD AUTO: 5.11 M/UL (ref 4.23–5.6)
SODIUM SERPL-SCNC: 140 MMOL/L (ref 136–145)
TRIGL SERPL-MCNC: 88 MG/DL (ref 35–150)
TROPONIN I SERPL-MCNC: 10.4 NG/ML (ref 0.02–0.05)
VENTRICULAR RATE, ECG03: 76 BPM
VLDLC SERPL CALC-MCNC: 17.6 MG/DL (ref 6–23)
WBC # BLD AUTO: 8.1 K/UL (ref 4.3–11.1)

## 2019-02-12 PROCEDURE — C1887 CATHETER, GUIDING: HCPCS

## 2019-02-12 PROCEDURE — 80061 LIPID PANEL: CPT

## 2019-02-12 PROCEDURE — C1769 GUIDE WIRE: HCPCS

## 2019-02-12 PROCEDURE — 74011250636 HC RX REV CODE- 250/636: Performed by: NURSE PRACTITIONER

## 2019-02-12 PROCEDURE — 84484 ASSAY OF TROPONIN QUANT: CPT

## 2019-02-12 PROCEDURE — 74011250637 HC RX REV CODE- 250/637: Performed by: NURSE PRACTITIONER

## 2019-02-12 PROCEDURE — 74011000250 HC RX REV CODE- 250: Performed by: INTERNAL MEDICINE

## 2019-02-12 PROCEDURE — 85347 COAGULATION TIME ACTIVATED: CPT

## 2019-02-12 PROCEDURE — 74011250636 HC RX REV CODE- 250/636: Performed by: INTERNAL MEDICINE

## 2019-02-12 PROCEDURE — 93005 ELECTROCARDIOGRAM TRACING: CPT | Performed by: INTERNAL MEDICINE

## 2019-02-12 PROCEDURE — C8929 TTE W OR WO FOL WCON,DOPPLER: HCPCS

## 2019-02-12 PROCEDURE — C1874 STENT, COATED/COV W/DEL SYS: HCPCS

## 2019-02-12 PROCEDURE — 74011000302 HC RX REV CODE- 302: Performed by: NURSE PRACTITIONER

## 2019-02-12 PROCEDURE — 86580 TB INTRADERMAL TEST: CPT | Performed by: NURSE PRACTITIONER

## 2019-02-12 PROCEDURE — 92928 PRQ TCAT PLMT NTRAC ST 1 LES: CPT

## 2019-02-12 PROCEDURE — 80048 BASIC METABOLIC PNL TOTAL CA: CPT

## 2019-02-12 PROCEDURE — 74011250637 HC RX REV CODE- 250/637: Performed by: PHYSICIAN ASSISTANT

## 2019-02-12 PROCEDURE — 77030019569 HC BND COMPR RAD TERU -B

## 2019-02-12 PROCEDURE — 85730 THROMBOPLASTIN TIME PARTIAL: CPT

## 2019-02-12 PROCEDURE — 93454 CORONARY ARTERY ANGIO S&I: CPT

## 2019-02-12 PROCEDURE — 77030020263 HC SOL INJ SOD CL0.9% LFCR 1000ML

## 2019-02-12 PROCEDURE — 74011250636 HC RX REV CODE- 250/636

## 2019-02-12 PROCEDURE — 74011636320 HC RX REV CODE- 636/320: Performed by: INTERNAL MEDICINE

## 2019-02-12 PROCEDURE — 74011250637 HC RX REV CODE- 250/637: Performed by: INTERNAL MEDICINE

## 2019-02-12 PROCEDURE — 77030004534 HC CATH ANGI DX INFN CARD -A

## 2019-02-12 PROCEDURE — 36415 COLL VENOUS BLD VENIPUNCTURE: CPT

## 2019-02-12 PROCEDURE — 027034Z DILATION OF CORONARY ARTERY, ONE ARTERY WITH DRUG-ELUTING INTRALUMINAL DEVICE, PERCUTANEOUS APPROACH: ICD-10-PCS | Performed by: INTERNAL MEDICINE

## 2019-02-12 PROCEDURE — C1725 CATH, TRANSLUMIN NON-LASER: HCPCS

## 2019-02-12 PROCEDURE — 82962 GLUCOSE BLOOD TEST: CPT

## 2019-02-12 PROCEDURE — 4A023N7 MEASUREMENT OF CARDIAC SAMPLING AND PRESSURE, LEFT HEART, PERCUTANEOUS APPROACH: ICD-10-PCS | Performed by: INTERNAL MEDICINE

## 2019-02-12 PROCEDURE — 85027 COMPLETE CBC AUTOMATED: CPT

## 2019-02-12 PROCEDURE — 65660000000 HC RM CCU STEPDOWN

## 2019-02-12 PROCEDURE — C1894 INTRO/SHEATH, NON-LASER: HCPCS

## 2019-02-12 PROCEDURE — B2111ZZ FLUOROSCOPY OF MULTIPLE CORONARY ARTERIES USING LOW OSMOLAR CONTRAST: ICD-10-PCS | Performed by: INTERNAL MEDICINE

## 2019-02-12 PROCEDURE — 99153 MOD SED SAME PHYS/QHP EA: CPT

## 2019-02-12 PROCEDURE — 99152 MOD SED SAME PHYS/QHP 5/>YRS: CPT

## 2019-02-12 PROCEDURE — 77030015766

## 2019-02-12 RX ORDER — SODIUM CHLORIDE 0.9 % (FLUSH) 0.9 %
5-40 SYRINGE (ML) INJECTION EVERY 8 HOURS
Status: DISCONTINUED | OUTPATIENT
Start: 2019-02-12 | End: 2019-02-13 | Stop reason: HOSPADM

## 2019-02-12 RX ORDER — SODIUM CHLORIDE 9 MG/ML
75 INJECTION, SOLUTION INTRAVENOUS CONTINUOUS
Status: DISCONTINUED | OUTPATIENT
Start: 2019-02-12 | End: 2019-02-13

## 2019-02-12 RX ORDER — FENTANYL CITRATE 50 UG/ML
25-50 INJECTION, SOLUTION INTRAMUSCULAR; INTRAVENOUS
Status: DISCONTINUED | OUTPATIENT
Start: 2019-02-12 | End: 2019-02-12

## 2019-02-12 RX ORDER — CLOPIDOGREL BISULFATE 75 MG/1
75 TABLET ORAL DAILY
Status: DISCONTINUED | OUTPATIENT
Start: 2019-02-13 | End: 2019-02-13

## 2019-02-12 RX ORDER — LIDOCAINE HYDROCHLORIDE 10 MG/ML
6 INJECTION INFILTRATION; PERINEURAL ONCE
Status: COMPLETED | OUTPATIENT
Start: 2019-02-12 | End: 2019-02-12

## 2019-02-12 RX ORDER — ATORVASTATIN CALCIUM 40 MG/1
80 TABLET, FILM COATED ORAL
Status: DISCONTINUED | OUTPATIENT
Start: 2019-02-12 | End: 2019-02-13 | Stop reason: HOSPADM

## 2019-02-12 RX ORDER — MIDAZOLAM HYDROCHLORIDE 1 MG/ML
.5-2 INJECTION, SOLUTION INTRAMUSCULAR; INTRAVENOUS
Status: DISCONTINUED | OUTPATIENT
Start: 2019-02-12 | End: 2019-02-12

## 2019-02-12 RX ORDER — ACETAMINOPHEN 325 MG/1
650 TABLET ORAL
Status: DISCONTINUED | OUTPATIENT
Start: 2019-02-12 | End: 2019-02-13 | Stop reason: HOSPADM

## 2019-02-12 RX ORDER — NITROGLYCERIN 0.4 MG/1
0.4 TABLET SUBLINGUAL
Status: DISCONTINUED | OUTPATIENT
Start: 2019-02-12 | End: 2019-02-13 | Stop reason: HOSPADM

## 2019-02-12 RX ORDER — CLOPIDOGREL BISULFATE 75 MG/1
600 TABLET ORAL ONCE
Status: COMPLETED | OUTPATIENT
Start: 2019-02-12 | End: 2019-02-12

## 2019-02-12 RX ORDER — HEPARIN SODIUM 200 [USP'U]/100ML
3 INJECTION, SOLUTION INTRAVENOUS CONTINUOUS
Status: DISCONTINUED | OUTPATIENT
Start: 2019-02-12 | End: 2019-02-12

## 2019-02-12 RX ORDER — HYDROCODONE BITARTRATE AND ACETAMINOPHEN 5; 325 MG/1; MG/1
1 TABLET ORAL
Status: DISCONTINUED | OUTPATIENT
Start: 2019-02-12 | End: 2019-02-13 | Stop reason: HOSPADM

## 2019-02-12 RX ORDER — HEPARIN SODIUM 10000 [USP'U]/ML
1000-10000 INJECTION, SOLUTION INTRAVENOUS; SUBCUTANEOUS
Status: DISCONTINUED | OUTPATIENT
Start: 2019-02-12 | End: 2019-02-12

## 2019-02-12 RX ORDER — SODIUM CHLORIDE 9 MG/ML
50 INJECTION, SOLUTION INTRAVENOUS CONTINUOUS
Status: DISCONTINUED | OUTPATIENT
Start: 2019-02-12 | End: 2019-02-12

## 2019-02-12 RX ORDER — MAG HYDROX/ALUMINUM HYD/SIMETH 200-200-20
30 SUSPENSION, ORAL (FINAL DOSE FORM) ORAL AS NEEDED
Status: DISCONTINUED | OUTPATIENT
Start: 2019-02-12 | End: 2019-02-13 | Stop reason: HOSPADM

## 2019-02-12 RX ORDER — SODIUM CHLORIDE 0.9 % (FLUSH) 0.9 %
5-40 SYRINGE (ML) INJECTION AS NEEDED
Status: DISCONTINUED | OUTPATIENT
Start: 2019-02-12 | End: 2019-02-13 | Stop reason: HOSPADM

## 2019-02-12 RX ADMIN — ACETAMINOPHEN 650 MG: 325 TABLET, FILM COATED ORAL at 23:05

## 2019-02-12 RX ADMIN — Medication 5 ML: at 21:49

## 2019-02-12 RX ADMIN — IOPAMIDOL 200 ML: 755 INJECTION, SOLUTION INTRAVENOUS at 10:14

## 2019-02-12 RX ADMIN — MIDAZOLAM HYDROCHLORIDE 2 MG: 1 INJECTION, SOLUTION INTRAMUSCULAR; INTRAVENOUS at 09:24

## 2019-02-12 RX ADMIN — METOPROLOL TARTRATE 25 MG: 25 TABLET ORAL at 08:20

## 2019-02-12 RX ADMIN — DILTIAZEM HYDROCHLORIDE 30 MG: 30 TABLET, FILM COATED ORAL at 21:49

## 2019-02-12 RX ADMIN — TUBERCULIN PURIFIED PROTEIN DERIVATIVE 5 UNITS: 5 INJECTION, SOLUTION INTRADERMAL at 21:52

## 2019-02-12 RX ADMIN — FENTANYL CITRATE 25 MCG: 50 INJECTION, SOLUTION INTRAMUSCULAR; INTRAVENOUS at 09:24

## 2019-02-12 RX ADMIN — HEPARIN SODIUM 4000 UNITS: 10000 INJECTION INTRAVENOUS; SUBCUTANEOUS at 09:43

## 2019-02-12 RX ADMIN — PREGABALIN 50 MG: 50 CAPSULE ORAL at 08:17

## 2019-02-12 RX ADMIN — LOSARTAN POTASSIUM 50 MG: 50 TABLET ORAL at 08:18

## 2019-02-12 RX ADMIN — METOPROLOL TARTRATE 25 MG: 25 TABLET ORAL at 00:48

## 2019-02-12 RX ADMIN — ALUMINUM HYDROXIDE, MAGNESIUM HYDROXIDE, AND SIMETHICONE 30 ML: 200; 200; 20 SUSPENSION ORAL at 10:08

## 2019-02-12 RX ADMIN — METOPROLOL TARTRATE 25 MG: 25 TABLET ORAL at 18:11

## 2019-02-12 RX ADMIN — FAMOTIDINE 20 MG: 20 TABLET ORAL at 18:11

## 2019-02-12 RX ADMIN — PERFLUTREN 1 ML: 6.52 INJECTION, SUSPENSION INTRAVENOUS at 12:00

## 2019-02-12 RX ADMIN — ZINC 1 TABLET: TAB ORAL at 08:17

## 2019-02-12 RX ADMIN — RIVAROXABAN 15 MG: 15 TABLET, FILM COATED ORAL at 16:46

## 2019-02-12 RX ADMIN — HEPARIN SODIUM 3 ML/HR: 5000 INJECTION, SOLUTION INTRAVENOUS; SUBCUTANEOUS at 09:30

## 2019-02-12 RX ADMIN — ATORVASTATIN CALCIUM 80 MG: 40 TABLET, FILM COATED ORAL at 21:49

## 2019-02-12 RX ADMIN — LIDOCAINE HYDROCHLORIDE 6 ML: 10 INJECTION, SOLUTION INFILTRATION; PERINEURAL at 09:26

## 2019-02-12 RX ADMIN — DILTIAZEM HYDROCHLORIDE 30 MG: 30 TABLET, FILM COATED ORAL at 05:38

## 2019-02-12 RX ADMIN — CLOPIDOGREL 600 MG: 75 TABLET, FILM COATED ORAL at 10:08

## 2019-02-12 RX ADMIN — ASPIRIN 81 MG: 81 TABLET, COATED ORAL at 08:17

## 2019-02-12 RX ADMIN — Medication 10 ML: at 05:38

## 2019-02-12 RX ADMIN — SODIUM CHLORIDE 50 ML/HR: 900 INJECTION, SOLUTION INTRAVENOUS at 04:31

## 2019-02-12 RX ADMIN — DILTIAZEM HYDROCHLORIDE 30 MG: 30 TABLET, FILM COATED ORAL at 13:05

## 2019-02-12 RX ADMIN — Medication 5 ML: at 14:09

## 2019-02-12 RX ADMIN — HEPARIN SODIUM 2 ML: 10000 INJECTION INTRAVENOUS; SUBCUTANEOUS at 09:29

## 2019-02-12 RX ADMIN — PREGABALIN 50 MG: 50 CAPSULE ORAL at 16:46

## 2019-02-12 RX ADMIN — Medication 5 ML: at 21:50

## 2019-02-12 RX ADMIN — DILTIAZEM HYDROCHLORIDE 30 MG: 30 TABLET, FILM COATED ORAL at 16:46

## 2019-02-12 RX ADMIN — PREGABALIN 50 MG: 50 CAPSULE ORAL at 21:49

## 2019-02-12 RX ADMIN — METOPROLOL TARTRATE 25 MG: 25 TABLET ORAL at 13:07

## 2019-02-12 NOTE — PROCEDURES
[facility]  CARDIAC CATH    Name:  Holland Alvarez  MR#:  [id number]  :  1950  ACCOUNT #:  [de-identified]  DATE OF SERVICE:  2019      PROCEDURES PERFORMED:  Coronary angiography via the right radial approach. INDICATIONS:  Non-ST elevation myocardial infarction. TECHNIQUE AND FINDINGS:  After informed consent was obtained, the patient was brought  to the cardiac catheterization lab. The right radial artery was prepped and draped  in the usual sterile fashion. Utilizing modified Seldinger technique and  micropuncture needle, the right radial artery was entered. A 6-Kittitian Terumo Slender  sheath was placed without difficulty. A radial cocktail consisted of 2000 units of  heparin, 2 mg of verapamil, and 200 mcg of nitroglycerin was administered. A  5-Kittitian [place] catheter was advanced, but could not engage to coronary arteries. The  right subclavian was very tortuous, it made catheter manipulation difficult. A JL-4  catheter was used to selectively engage the ostium of left main coronary artery. With this injection, I was able to visualize this well. The right coronary artery  came off the left coronary cusp. Selective injection verification was performed. Despite attempts with a AL1, AL2, and a multipurpose catheter, I could not engage the  anomalous right coronary artery due to difficulty with catheter manipulation. Based  on images with nonselective injection, it appeared to be patent with mild-to-moderate  disease as outlined below. At the conclusion of the procedure, the patient was  referred for PCI of the distal circumflex. SEDATION:  The patient received 2 mg of Versed and 25 mcg of fentanyl. Sedation  start time was 09:25 a.m. with procedure completion time at 10:06. Sedation was  administered by Ameena Koo RN, under my supervision. CONTRAST:  Isovue 200. HEMODYNAMIC RESULTS:  Aortic pressure 108/76 with a mean of 87. ANGIOGRAPHIC RESULTS:  1. Left main coronary artery:  Large caliber vessel. Angiographically normal.  2.  LAD:  Medium caliber vessel. Claims 30% mid stenosis. 20%-30% distal stenosis. 3.  First diagonal artery:  The small caliber vessel. Diffuse disease in the  proximal segment with 60% stenosis. 4.  Second diagonal artery:  Small caliber vessel, patent. 5.  Left circumflex:  Medium caliber codominant vessel. It is occluded in the distal  segment. This is a culprit of the non-ST elevation myocardial infarction. 6.  First obtuse marginal artery:  Medium caliber vessel, 20% ostial stenosis. 7.  Second obtuse marginal artery:  Small caliber vessel, patent. 8.  Right coronary artery:  Arises from the left cusp. This is seen with left  coronary injections. There is a 40% proximal and mid eccentric stenosis. The  remainder of the vessel contains mild luminal irregularities. 9.  Right PDA:  Medium caliber vessel, appears to be patent. 10.  Right posterior and lateral branch:  Small caliber vessels were patent. 11.  Left ventriculogram:  Not performed. CONCLUSION:  1. Occluded distal circumflex, which is the culprit of non-ST elevation myocardial  infarction. 2.  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 Difficult  for selective engagement and was visualized with injection of the left coronary  circulation. 3.  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 Plan:  Proceed with PCI with distal circumflex. PCI NOTE:  Distal circumflex, pre-stenosis 100%, post-stenosis 0%. TECHNIQUE AND FINDINGS:  The patient has received intravenous heparin. Additional  4000 units was given. ACT was greater than 300. A XT 3.0 guide was used to  selectively engage the ostium of the left main coronary artery. A Whisper wire was  placed distal to the stenosis. The stenosis was dilated with Emerge 2.0 x 12 mm  balloon to 8 atmospheres.   Following pre-dilatation, a Resolute Nik 2.0 x 15 mm  drug-eluting stent was positioned, deployed to 12 atmospheres. This was then  post-dilated with a Jensen IDANIA 2.0 x 12 mm balloon to 14 atmospheres. Followup  postdilatation, there was excellent angiographic result with no residual stenosis. The wire was removed, final thought and projections were obtained. CONCLUSION:  Successful percutaneous coronary intervention with stenting of the  distal circumflex with a 2.0 x 15 mm Resolute [place] drug-eluting stent. PLAN:  Ongoing medical therapy. Note:  In the future, if cardiac catheterization is needed, I would proceed with a  right femoral approach to avoid subclavian tortuosity for better catheter  manipulations. The anomalous right coronary artery can be better engaged for more  adequate visualization.         Sona Terrazas MD      [id number]  D:  02/12/2019 10:32  T:  02/12/2019 13:44  JOB #:  [id number]

## 2019-02-12 NOTE — PROGRESS NOTES
TRANSFER - OUT REPORT: 
 
Verbal report given to Nitza(name) on Bala Veloz  being transferred to cpru(unit) for routine progression of care Report consisted of patients Situation, Background, Assessment and  
Recommendations(SBAR). Information from the following report(s) SBAR was reviewed with the receiving nurse. Opportunity for questions and clarification was provided. Procedure: [facility]   Finding Summary: stent x 1 circ, 100% occluded(cath/pci/pacer settings) Location: rwrist    Closure Device: trband 15ml(yes/no/description) Post Site Assessment: no bleeding no hematoma Intra Procedure Meds: 
 
Versed: 2mg Fentanyl: 25mcg Antiplatelet: 100 mg Plavix 30ml Mylanta Peripheral IV 02/11/19 Left Antecubital (Active) Site Assessment Clean, dry, & intact 2/12/2019  4:30 AM  
Phlebitis Assessment 0 2/12/2019  4:30 AM  
Infiltration Assessment 0 2/12/2019  4:30 AM  
Dressing Status Clean, dry, & intact 2/12/2019  4:30 AM  
Dressing Type Transparent 2/12/2019  4:30 AM  
Hub Color/Line Status Patent 2/12/2019  4:30 AM  
Alcohol Cap Used No 2/11/2019  5:50 PM  
   
Peripheral IV 02/12/19 Right Arm (Active) Site Assessment Clean, dry, & intact 2/12/2019  5:51 AM  
Phlebitis Assessment 0 2/12/2019  5:51 AM  
Infiltration Assessment 0 2/12/2019  5:51 AM  
Dressing Status Clean, dry, & intact 2/12/2019  5:51 AM  
Dressing Type Transparent 2/12/2019  5:51 AM  
Hub Color/Line Status Patent 2/12/2019  5:51 AM  
 
  
Post-Procedure Site Assessment (1) Wound Type: Catheter entry/exit Location: Wrist 
Orientation : Right Hemostasis : TR Band(15ml) Site Assessment: No bleeding, No hematoma 
  
  
  
  
  
  
  
is allergic to pcn [penicillins]. Past Medical History:  
Diagnosis Date  Anxiety  Dermatophytosis of nail  Diabetes ([facility] [address] 9/12/2014  Diabetic polyneuropathy ([facility] [facility]  Heart murmur  AORTIC SYSTOLIC FLOW MURMUR, [address]  
 Hepatic encephalopathy ([facility] 5/5/2014  Hyperlipidemia  Hypertension  Metatarsal stress fracture  Metatarsalgia  Osteoarthritis  Peripheral edema  Psychiatric disorder   
 depression  UTI (urinary tract infection) Visit Vitals /75 (BP Patient Position: Supine) Pulse 78 Temp 97.5 °F (36.4 °C) Resp 16 Ht 5' 6\" (1.676 m) Wt 73.7 kg (162 lb 8 oz) SpO2 98% BMI 26.23 kg/m²

## 2019-02-12 NOTE — PROGRESS NOTES
Bedside and Verbal shift change report given to Abril Grover and Ana Maria Newsome (oncoming nurse) by self Scarlet barkley). Report included the following information SBAR, Kardex, MAR and Recent Results.  Heparin gtt verified at bedside with oncoming RN

## 2019-02-12 NOTE — PROGRESS NOTES
Care Management Interventions PCP Verified by CM: Yes(2 months ago ) Palliative Care Criteria Met (RRAT>21 & CHF Dx)?: No(RRAT 26 Dx Atrial fib ) Transition of Care Consult (CM Consult): Discharge Planning Discharge Durable Medical Equipment: No(cane) Physical Therapy Consult: No 
Occupational Therapy Consult: No 
Speech Therapy Consult: No 
Current Support Network: Lives Alone Confirm Follow Up Transport: Friends(friends) Plan discussed with Pt/Family/Caregiver: Yes Freedom of Choice Offered: Yes Discharge Location Discharge Placement: Home Met with patient for d/c planning. Patient alert and oriented x 3, independent of ADL's and lives alone. He has a cane and did have electric wheelchair but it is broken. Discussed options to have his PCP Dr. Go Britton order another W/C for him when he see him for follow up. He states Medicaid paid for the last one about 8-9 years ago. He lives in an apartment on the bottom floor with no stairs to entrance. He does not drive and ask friends to provide transportation. Patient has [facility] and is able to obtain his medications at [facility] on [address]. He relies on friends for transportation and CM discussed [place] care transport for Medicaid patients and will provide phone number for patient to set up his medical appointments. Current d/c plan is home when medically stable. CM following.

## 2019-02-12 NOTE — PROGRESS NOTES
Bedside and Verbal shift change report given to Manule (oncoming nurse) by Hilda Blake (offgoing nurse). Report included the following information SBAR, Kardex, MAR and Recent Results. Heparin gtt verified at bedside

## 2019-02-12 NOTE — PROGRESS NOTES
[facility] CARDIOLOGY PROGRESS NOTE 
      
 
2/12/2019 8:13 AM 
 
Admit Date: 2/11/2019 Subjective:  
Patient denies any chest pain or dyspnea. Rate controlled with PO cardizem and B-blocker. ROS: 
Cardiovascular:  As noted above Objective:  
  
Vitals:  
 02/12/19 0663 02/12/19 0126 02/12/19 0440 02/12/19 1593 BP: 159/80 123/74 95/51 107/64 Pulse: 76 89 67 86 Resp:  18 18 Temp:  98.5 °F (36.9 °C) 97.5 °F (36.4 °C) SpO2:  97% 96% Weight:   73.7 kg (162 lb 8 oz) Height:      
 
 
Physical Exam: 
General-No Acute Distress Neck- supple, no JVD 
CV- regular rate and rhythm no MRG Lung- clear bilaterally Abd- soft, nontender, nondistended Ext- no edema bilaterally. Skin- warm and dry Data Review:  
Recent Labs  
  02/12/19 
0118 02/11/19 
1329  141  
K 3.5 3.5 MG  --  1.7*  
BUN 15 11 CREA 1.38 1.57* * 172* WBC 8.1 6.4 HGB 16.7 16.1 HCT 47.3 46.4  167 TRIGL 88  --   
HDL 56  --   
 
  
Lab Results Component Value Date/Time TROIQ 10.40 () 02/12/2019 01:18 AM  
 TROIQ 5.40 ([facility]) 02/11/2019 07:44 PM  
 TROIQ 0.71 ([facility]) 02/11/2019 01:29 PM  
 
 
Assessment/Plan:  
 
Principal Problem: 
  Atrial fibrillation with RVR ([facility] [address] (2/11/2019) Rate controlled. On IV heparin. Active Problems: 
  Diabetes ([facility] [address] (9/12/2014) SSI and diabetic diet. HTN (hypertension) (9/12/2014) BP controlled. Hyperlipidemia () Change to PO Lipitor. NSTEMI. C today. Vomiting (2/11/2019) REsolved. No abdominal pain. No recurrence.   
 
 
 
 
 
 
 
 
Naveed Hawthorne MD 
2/12/2019 8:13 AM

## 2019-02-12 NOTE — PROGRESS NOTES
responded to pt's request for assistance with a HCPOA.  arrived on unit and spoke with the pt's Rn. Rn stated that pt was alert and oriented and had moments when the pt spaces out.  and Rn together visited with pt and determined that the pt was not able to complete HCPOA. Pt appeared comfortable with no pain level expressed or observed. Pt was forgetful of the name of the person he wanted as his agent. Pt could not remember last name of his son.  suggested to the pt to contact  at another time when his family was present to give it another try. Pt stated that he would. Rn was in agreement.  provided spiritual care through presence, pastoral conversation, assurance of prayer, and assisting with HCPOA.

## 2019-02-12 NOTE — PROGRESS NOTES
Dual skin assessment completed & reveals the following: scar noted to mid abdomen, scattered scars to back & BLE, heels intact bilaterally & sacrum intact. No other redness or breakdown noted.

## 2019-02-12 NOTE — PROGRESS NOTES
Bedside and Verbal shift change report given to self (oncoming nurse) by Rachna Li (offgoing nurse). Report included the following information SBAR, Kardex, MAR and Recent Results.

## 2019-02-12 NOTE — PROGRESS NOTES
Problem: Falls - Risk of 
Goal: *Absence of Falls Document Joseline Traviss Fall Risk and appropriate interventions in the flowsheet. Outcome: Progressing Towards Goal 
Fall Risk Interventions: 
Mobility Interventions: Bed/chair exit alarm Medication Interventions: Evaluate medications/consider consulting pharmacy Elimination Interventions: Call light in reach

## 2019-02-12 NOTE — PROGRESS NOTES
Report received from Domenico/Nitza Cath Lab RN. Procedural findings communicated. Intra procedural  medication administration reviewed. Progression of care discussed. Patient received into [address] [facility] post sheath removal.  
 
Access site without bleeding or swelling yes Dressing dry and intact yes Patient instructed to limit movement to right upper extremity Routine post procedural vital signs and site assessment initiated yes

## 2019-02-12 NOTE — PROGRESS NOTES
TRANSFER - OUT REPORT: 
 
Verbal report given to Reza Najera on Maria Hernández  being transferred to [facility](unit) for routine progression of care Report consisted of patients Situation, Background, Assessment and  
Recommendations(SBAR). Information from the following report(s) Procedure Summary was reviewed with the receiving nurse. Lines:  
Peripheral IV 02/11/19 Left Antecubital (Active) Site Assessment Clean, dry, & intact 2/12/2019  4:30 AM  
Phlebitis Assessment 0 2/12/2019  4:30 AM  
Infiltration Assessment 0 2/12/2019  4:30 AM  
Dressing Status Clean, dry, & intact 2/12/2019  4:30 AM  
Dressing Type Transparent 2/12/2019  4:30 AM  
Hub Color/Line Status Patent 2/12/2019  4:30 AM  
Alcohol Cap Used No 2/11/2019  5:50 PM  
   
Peripheral IV 02/12/19 Right Arm (Active) Site Assessment Clean, dry, & intact 2/12/2019  5:51 AM  
Phlebitis Assessment 0 2/12/2019  5:51 AM  
Infiltration Assessment 0 2/12/2019  5:51 AM  
Dressing Status Clean, dry, & intact 2/12/2019  5:51 AM  
Dressing Type Transparent 2/12/2019  5:51 AM  
Hub Color/Line Status Patent 2/12/2019  5:51 AM  
  
 
Opportunity for questions and clarification was provided. Patient transported with: 
 [employer]

## 2019-02-12 NOTE — PROGRESS NOTES
Pt stated would like to complete advance directives at the hospital. Notified spiritual care. Pt to cath lab at this time; spiritual care to assess on return.

## 2019-02-12 NOTE — PROGRESS NOTES
Bedside and Verbal shift change report given to [place], RN (oncoming nurse) by self Davian barkley). Report included the following information SBAR, Kardex, MAR and Recent Results.

## 2019-02-12 NOTE — PROGRESS NOTES
TRANSFER - IN REPORT: 
 
Verbal report received from [facility] on Rushie Duty being received from [facility] (unit) for routine progression of care. Report consisted of patients Situation, Background, Assessment and Recommendations(SBAR). Information from the following report(s) SBAR, Kardex, [facility] and Recent Results was reviewed. Opportunity for questions and clarification was provided. Assessment completed upon patients arrival to unit and care assumed. Patient received to room 301. Patient connected to monitor and assessment completed. Plan of care reviewed. Patient oriented to room and call light. Patient aware to use call light to communicate any chest pain or needs. Right radial site CDI. TR band present with 15mL. No new skin abnormalities noted.

## 2019-02-13 ENCOUNTER — HOME HEALTH ADMISSION (OUTPATIENT)
Dept: HOME HEALTH SERVICES | Facility: HOME HEALTH | Age: 69
End: 2019-02-13
Payer: COMMERCIAL

## 2019-02-13 VITALS
SYSTOLIC BLOOD PRESSURE: 108 MMHG | OXYGEN SATURATION: 97 % | HEART RATE: 105 BPM | RESPIRATION RATE: 20 BRPM | WEIGHT: 163.5 LBS | BODY MASS INDEX: 26.28 KG/M2 | HEIGHT: 66 IN | TEMPERATURE: 98.2 F | DIASTOLIC BLOOD PRESSURE: 80 MMHG

## 2019-02-13 PROBLEM — I21.4 NSTEMI (NON-ST ELEVATED MYOCARDIAL INFARCTION) (HCC): Status: ACTIVE | Noted: 2019-02-11

## 2019-02-13 LAB
ANION GAP SERPL CALC-SCNC: 10 MMOL/L (ref 7–16)
ATRIAL RATE: 122 BPM
BASOPHILS # BLD: 0 K/UL (ref 0–0.2)
BASOPHILS NFR BLD: 0 % (ref 0–2)
BUN SERPL-MCNC: 15 MG/DL (ref 8–23)
CALCIUM SERPL-MCNC: 8.2 MG/DL (ref 8.3–10.4)
CALCULATED R AXIS, ECG10: 57 DEGREES
CALCULATED T AXIS, ECG11: -41 DEGREES
CHLORIDE SERPL-SCNC: 105 MMOL/L (ref 98–107)
CHOLEST SERPL-MCNC: 177 MG/DL
CO2 SERPL-SCNC: 24 MMOL/L (ref 21–32)
CREAT SERPL-MCNC: 1.34 MG/DL (ref 0.8–1.5)
DIAGNOSIS, 93000: NORMAL
DIFFERENTIAL METHOD BLD: ABNORMAL
EOSINOPHIL # BLD: 0 K/UL (ref 0–0.8)
EOSINOPHIL NFR BLD: 0 % (ref 0.5–7.8)
ERYTHROCYTE [DISTWIDTH] IN BLOOD BY AUTOMATED COUNT: 13.7 % (ref 11.9–14.6)
EST. AVERAGE GLUCOSE BLD GHB EST-MCNC: 131 MG/DL
GLUCOSE BLD STRIP.AUTO-MCNC: 100 MG/DL (ref 65–100)
GLUCOSE BLD STRIP.AUTO-MCNC: 112 MG/DL (ref 65–100)
GLUCOSE SERPL-MCNC: 101 MG/DL (ref 65–100)
HBA1C MFR BLD: 6.2 % (ref 4.8–6)
HCT VFR BLD AUTO: 47.9 % (ref 41.1–50.3)
HDLC SERPL-MCNC: 37 MG/DL (ref 40–60)
HDLC SERPL: 4.8 {RATIO}
HGB BLD-MCNC: 16.3 G/DL (ref 13.6–17.2)
IMM GRANULOCYTES # BLD AUTO: 0 K/UL (ref 0–0.5)
IMM GRANULOCYTES NFR BLD AUTO: 0 % (ref 0–5)
LDLC SERPL CALC-MCNC: 107.8 MG/DL
LIPID PROFILE,FLP: ABNORMAL
LYMPHOCYTES # BLD: 2.2 K/UL (ref 0.5–4.6)
LYMPHOCYTES NFR BLD: 21 % (ref 13–44)
MAGNESIUM SERPL-MCNC: 2.2 MG/DL (ref 1.8–2.4)
MCH RBC QN AUTO: 32.1 PG (ref 26.1–32.9)
MCHC RBC AUTO-ENTMCNC: 34 G/DL (ref 31.4–35)
MCV RBC AUTO: 94.3 FL (ref 79.6–97.8)
MONOCYTES # BLD: 1 K/UL (ref 0.1–1.3)
MONOCYTES NFR BLD: 10 % (ref 4–12)
NEUTS SEG # BLD: 7.1 K/UL (ref 1.7–8.2)
NEUTS SEG NFR BLD: 69 % (ref 43–78)
NRBC # BLD: 0 K/UL (ref 0–0.2)
P-R INTERVAL, ECG05: 336 MS
PLATELET # BLD AUTO: 168 K/UL (ref 150–450)
PMV BLD AUTO: 10.6 FL (ref 9.4–12.3)
POTASSIUM SERPL-SCNC: 3.5 MMOL/L (ref 3.5–5.1)
Q-T INTERVAL, ECG07: 360 MS
QRS DURATION, ECG06: 134 MS
QTC CALCULATION (BEZET), ECG08: 487 MS
RBC # BLD AUTO: 5.08 M/UL (ref 4.23–5.6)
SODIUM SERPL-SCNC: 139 MMOL/L (ref 136–145)
TRIGL SERPL-MCNC: 161 MG/DL (ref 35–150)
VENTRICULAR RATE, ECG03: 110 BPM
VLDLC SERPL CALC-MCNC: 32.2 MG/DL (ref 6–23)
WBC # BLD AUTO: 10.3 K/UL (ref 4.3–11.1)

## 2019-02-13 PROCEDURE — 82962 GLUCOSE BLOOD TEST: CPT

## 2019-02-13 PROCEDURE — 85025 COMPLETE CBC W/AUTO DIFF WBC: CPT

## 2019-02-13 PROCEDURE — 74011250637 HC RX REV CODE- 250/637: Performed by: PHYSICIAN ASSISTANT

## 2019-02-13 PROCEDURE — 93005 ELECTROCARDIOGRAM TRACING: CPT | Performed by: INTERNAL MEDICINE

## 2019-02-13 PROCEDURE — 36415 COLL VENOUS BLD VENIPUNCTURE: CPT

## 2019-02-13 PROCEDURE — 83735 ASSAY OF MAGNESIUM: CPT

## 2019-02-13 PROCEDURE — 74011250637 HC RX REV CODE- 250/637: Performed by: NURSE PRACTITIONER

## 2019-02-13 PROCEDURE — 83036 HEMOGLOBIN GLYCOSYLATED A1C: CPT

## 2019-02-13 PROCEDURE — 80048 BASIC METABOLIC PNL TOTAL CA: CPT

## 2019-02-13 PROCEDURE — 74011250637 HC RX REV CODE- 250/637: Performed by: INTERNAL MEDICINE

## 2019-02-13 PROCEDURE — 80061 LIPID PANEL: CPT

## 2019-02-13 RX ORDER — CLOPIDOGREL BISULFATE 75 MG/1
75 TABLET ORAL DAILY
Qty: 30 TAB | Refills: 11 | Status: SHIPPED | OUTPATIENT
Start: 2019-02-14

## 2019-02-13 RX ORDER — NITROGLYCERIN 0.4 MG/1
0.4 TABLET SUBLINGUAL
Qty: 1 BOTTLE | Refills: 5 | Status: SHIPPED | OUTPATIENT
Start: 2019-02-13

## 2019-02-13 RX ORDER — METOPROLOL TARTRATE 100 MG/1
100 TABLET ORAL 2 TIMES DAILY
Qty: 60 TAB | Refills: 11 | Status: SHIPPED | OUTPATIENT
Start: 2019-02-13

## 2019-02-13 RX ORDER — METOPROLOL TARTRATE 100 MG/1
100 TABLET ORAL 2 TIMES DAILY
Status: DISCONTINUED | OUTPATIENT
Start: 2019-02-13 | End: 2019-02-13 | Stop reason: HOSPADM

## 2019-02-13 RX ORDER — CLOPIDOGREL BISULFATE 75 MG/1
75 TABLET ORAL DAILY
Status: DISCONTINUED | OUTPATIENT
Start: 2019-02-13 | End: 2019-02-13 | Stop reason: HOSPADM

## 2019-02-13 RX ORDER — ATORVASTATIN CALCIUM 80 MG/1
80 TABLET, FILM COATED ORAL
Qty: 30 TAB | Refills: 11 | Status: SHIPPED | OUTPATIENT
Start: 2019-02-13

## 2019-02-13 RX ADMIN — PREGABALIN 50 MG: 50 CAPSULE ORAL at 09:04

## 2019-02-13 RX ADMIN — Medication 10 ML: at 06:10

## 2019-02-13 RX ADMIN — METOPROLOL TARTRATE 25 MG: 25 TABLET ORAL at 01:50

## 2019-02-13 RX ADMIN — METOPROLOL TARTRATE 100 MG: 100 TABLET ORAL at 09:05

## 2019-02-13 RX ADMIN — CLOPIDOGREL 75 MG: 75 TABLET, FILM COATED ORAL at 09:04

## 2019-02-13 RX ADMIN — Medication 10 ML: at 13:32

## 2019-02-13 RX ADMIN — LOSARTAN POTASSIUM 50 MG: 50 TABLET ORAL at 09:04

## 2019-02-13 RX ADMIN — ZINC 1 TABLET: TAB ORAL at 09:04

## 2019-02-13 NOTE — PROGRESS NOTES
Patient has decided that he is not going to go home alone and is going to stay with his friend Roby Villagomez at [address] [place] [address] Avenue [phone]. Patient states he is weak and has a wheelchair and walker he can use. He is interested in [facility] referral and request CM assist. CM discussed assistance options and asked him if he is interested in SNF and what that would include and patient stated no he was okay with going home. Notified [facility] of address and friends name for home health. Addendum [facility] reference number [id number]

## 2019-02-13 NOTE — PROGRESS NOTES
Bedside and Verbal shift change report given to Karyle Skiff (oncoming nurse) by Mendez Gonzalez (offgoing nurse). Report included the following information SBAR, Kardex, MAR and Recent Results.

## 2019-02-13 NOTE — PROGRESS NOTES
Care Management Interventions PCP Verified by CM: Yes(2 months ago ) Palliative Care Criteria Met (RRAT>21 & CHF Dx)?: No(RRAT 26 Dx Atrial fib ) Mode of Transport at Discharge: Other (see comment) Transition of Care Consult (CM Consult): Home Health [facility] - INPATIENT: Yes Discharge Durable Medical Equipment: No 
Physical Therapy Consult: Yes Occupational Therapy Consult: No 
Speech Therapy Consult: No 
Current Support Network: Lives Alone Confirm Follow Up Transport: Friends(friends) Plan discussed with Pt/Family/Caregiver: Yes Freedom of Choice Offered: Yes Discharge Location Discharge Placement: Home with home health Patient ready for d/c home today. Patient agreeable to home health and [facility] as provider. Patient to d/c home with [facility].

## 2019-02-13 NOTE — DISCHARGE SUMMARY
[facility] Cardiology Discharge Summary     Patient ID:  Gi Benson  [id number]  87 y.o.  1950    Admit date: 2/11/2019    Discharge date:  02/13/19    Admitting Physician: Raghav Villafuerte MD     Discharge Physician: Lori Tejada NP/Dr. Snyder    Admission Diagnoses: Atrial fibrillation with RVR [facility]) [I48.91]    Discharge Diagnoses:   Patient Active Problem List    Diagnosis Date Noted    At[facility] fibrillation with [facility]VR ([facility] 02/11/2019    Chest pain 02/11/2019    Vomiting 02/11/2019    NSTEMI (non-ST elevated myocardial infarction) ([facility] 02/11/2019    Type 2 diabetes with nephropathy ([facility] 01/21/2019    Overweight (BMI 25.0-29.9) 12/12/2018    Anxiety     Diabetic polyneuropathy ([facility]     Heart murmur     Hyperlipidemia     Osteoarthritis     Diabetes ([facility] 09/12/2014    HTN (hypertension) 09/12/2014    Alcohol abuse 09/12/2014    GERD (gastroesophageal reflux disease) 09/12/2014    Hepatic encephalopathy ([facility] 05/05/2014       Cardiology Procedures this admission:  Left heart catheterization with PCI  EchoCardiogram  Consults: None    Hospital Course: Patient presented to the emergency department of [facility] with complaints of palpitations and shortness of breath. The patient was noted to be in atrial fibrillation with RVR on arrival.  A Cardizem bolus was given in the ER with good response. The patient was admitted to telemetry and remained with out RVR without a cardizem drip. The patient was converted to PO Cardizem and BB and monitored. With elevated Trop I that peaked at 10.4 the patient was also scheduled for a LHC by Dr Kami De. There were started on Heparin for anticoagulation.      Atrial Fibrillation CHADSVASC2 Score Stroke Risk:   80 y.o. 72 to 76  +1   male Male     +0   CHF HX: No    + 0   HTN HX: Yes    +1   Stroke/TIA/Thromboembolism No    +0   Vascular Disease HX: No    + 0   Diabetes Mellitus Yes    +1   CHADSVASC 2 Score 3      Annual Stroke Risk 3.2% - moderate-high          The patient was monitored on telemetry. The patient remained in rate controled A fib with HR less than 110. Echocardiogram was performed to show:     SUMMARY:    -  Left ventricle: Systolic function was hyperdynamic. Ejection fraction was  estimated in the range of 65 % to 70 %. There were no regional wall motion  abnormalities. There was mild to moderate concentric hypertrophy. -  Left atrium: The atrium was mildly dilated. -  Mitral valve: There was mild to moderate regurgitation. Leanna Kate, systemic arteries: The root exhibited mild dilatation. LHC was performed to show:    CONCLUSION:  1. Occluded distal circumflex, which is the culprit of non-ST elevation myocardial  infarction. 2.  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 Difficult  for selective engagement and was visualized with injection of the left coronary  circulation. 3.  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 Successful percutaneous coronary intervention with stenting of the distal circumflex with a 2.0 x 15 mm Resolute Onward drug-eluting stent with a 100% to 0% residual stenosis.       The patient felt much better back in sinus rhythm. The morning of 02/13/19, the patient was feeling much better and remained in sinus rhythm. The patient was seen and examined by Dr. Kristi Monk and was determined stable and ready for discharge home. The patient was instructed on the importance of medication compliance and outpatient follow up. The patient agrees to go home with home health and PT. The patient will be discharged on Xarelto at 15 mg due to CrCL as an [address], Plavix, and no ASA per the [place] Trial. Estimated Creatinine Clearance: 48.3 mL/min (based on SCr of 1.34 mg/dL).   The patient will follow up for a TC7 follow up with Dr. Kristi Monk on 2/20/2019    DISPOSITION: The patient is being discharged home in stable condition on a low saturated fat, low cholesterol and low salt diet. The patient is instructed to advance activities as tolerated to the limit of fatigue or shortness of breath. The patient is instructed to call the office or return to the ER for immediate evaluation for any severe shortness of breath, chest pain, prolonged palpitations, near syncope or syncope. Discharge Exam:   Visit Vitals  /75   Pulse 94   Temp 97.9 °F (36.6 °C)   Resp 20   Ht 5' 6\" (1.676 m)   Wt 74.2 kg (163 lb 8 oz)   SpO2 94%   BMI 26.39 kg/m²     Patient has been seen by Dr. Nayeli Hollidayr: see his progress note for exam details. Recent Results (from the past 24 hour(s))   EKG, 12 LEAD, INITIAL    Collection Time: 02/12/19  1:55 PM   Result Value Ref Range    Ventricular Rate 76 BPM    Atrial Rate 122 BPM    QRS Duration 100 ms    Q-T Interval 366 ms    QTC Calculation (Bezet) 411 ms    Calculated R Axis 47 degrees    Calculated T Axis -50 degrees    Diagnosis       Atrial fibrillation Aberrant conduction  Moderate voltage criteria for LVH, may be normal variant  ST & T wave abnormality, consider inferolateral ischemia  Abnormal ECG  When compared with ECG of 11-FEB-2019 13:28,  Vent.  rate has decreased BY  42 BPM  T wave inversion now evident in Lateral leads  Confirmed by Rachna Macedo ([id number]) on 2/12/2019 7:27:14 PM     GLUCOSE, POC    Collection Time: 02/12/19  4:15 PM   Result Value Ref Range    Glucose (POC) 110 (H) 65 - 100 mg/dL   GLUCOSE, POC    Collection Time: 02/12/19  9:37 PM   Result Value Ref Range    Glucose (POC) 131 (H) 65 - 935 mg/dL   METABOLIC PANEL, BASIC    Collection Time: 02/13/19  3:28 AM   Result Value Ref Range    Sodium 139 136 - 145 mmol/L    Potassium 3.5 3.5 - 5.1 mmol/L    Chloride 105 98 - 107 mmol/L    CO2 24 21 - 32 mmol/L    Anion gap 10 7 - 16 mmol/L    Glucose 101 (H) 65 - 100 mg/dL    BUN 15 8 - 23 MG/DL    Creatinine 1.34 0.8 - 1.5 MG/DL    GFR est AA >60 >60 ml/min/1.73m2    GFR est non-AA 57 (L) >60 ml/min/1.73m2    Calcium 8.2 (L) 8.3 - 10.4 MG/DL   LIPID PANEL    Collection Time: 02/13/19  3:28 AM   Result Value Ref Range    LIPID PROFILE          Cholesterol, total 177 <200 MG/DL    Triglyceride 161 (H) 35 - 150 MG/DL    HDL Cholesterol 37 (L) 40 - 60 MG/DL    LDL, calculated 107.8 (H) <100 MG/DL    VLDL, calculated 32.2 (H) 6.0 - 23.0 MG/DL    CHOL/HDL Ratio 4.8     MAGNESIUM    Collection Time: 02/13/19  3:28 AM   Result Value Ref Range    Magnesium 2.2 1.8 - 2.4 mg/dL   CBC WITH AUTOMATED DIFF    Collection Time: 02/13/19  3:28 AM   Result Value Ref Range    WBC 10.3 4.3 - 11.1 K/uL    RBC 5.08 4.23 - 5.6 M/uL    HGB 16.3 13.6 - 17.2 g/dL    HCT 47.9 41.1 - 50.3 %    MCV 94.3 79.6 - 97.8 FL    MCH 32.1 26.1 - 32.9 PG    MCHC 34.0 31.4 - 35.0 g/dL    RDW 13.7 11.9 - 14.6 %    PLATELET 096 801 - 348 K/uL    MPV 10.6 9.4 - 12.3 FL    ABSOLUTE NRBC 0.00 0.0 - 0.2 K/uL    DF AUTOMATED      NEUTROPHILS 69 43 - 78 %    LYMPHOCYTES 21 13 - 44 %    MONOCYTES 10 4.0 - 12.0 %    EOSINOPHILS 0 (L) 0.5 - 7.8 %    BASOPHILS 0 0.0 - 2.0 %    IMMATURE GRANULOCYTES 0 0.0 - 5.0 %    ABS. NEUTROPHILS 7.1 1.7 - 8.2 K/UL    ABS. LYMPHOCYTES 2.2 0.5 - 4.6 K/UL    ABS. MONOCYTES 1.0 0.1 - 1.3 K/UL    ABS. EOSINOPHILS 0.0 0.0 - 0.8 K/UL    ABS. BASOPHILS 0.0 0.0 - 0.2 K/UL    ABS. IMM. GRANS. 0.0 0.0 - 0.5 K/UL   GLUCOSE, POC    Collection Time: 02/13/19  6:15 AM   Result Value Ref Range    Glucose (POC) 100 65 - 100 mg/dL   EKG, 12 LEAD, INITIAL    Collection Time: 02/13/19  6:54 AM   Result Value Ref Range    Ventricular Rate 110 BPM    Atrial Rate 122 BPM    P-R Interval 336 ms    QRS Duration 134 ms    Q-T Interval 360 ms    QTC Calculation (Bezet) 487 ms    Calculated R Axis 57 degrees    Calculated T Axis -41 degrees    Diagnosis       Atrial fibrillation  aberrancy   ? LVH diffuse Twave abnormality   rate has increased since 7/12/19  Confirmed by MASHA ALBRIGHT (), Lemuel Baires ([id number]) on 2/13/2019 7:32:04 AM     GLUCOSE, POC    Collection Time: 02/13/19 10:43 AM   Result Value Ref Range    Glucose (POC) 112 (H) 65 - 100 mg/dL         Patient Instructions:     Current Discharge Medication List      START taking these medications    Details   atorvastatin (LIPITOR) 80 mg tablet Take 1 Tab by mouth nightly. Qty: 30 Tab, Refills: 11      metoprolol tartrate (LOPRESSOR) 100 mg IR tablet Take 1 Tab by mouth two (2) times a day. Qty: 60 Tab, Refills: 11      clopidogrel (PLAVIX) 75 mg tab Take 1 Tab by mouth daily. Qty: 30 Tab, Refills: 11      nitroglycerin (NITROSTAT) 0.4 mg SL tablet 1 Tab by SubLINGual route every five (5) minutes as needed for Chest Pain. Up to 3 doses. Qty: 1 Bottle, Refills: 5      rivaroxaban (XARELTO) 15 mg tab tablet Take 1 Tab by mouth daily (with dinner). Qty: 30 Tab, Refills: 11         CONTINUE these medications which have NOT CHANGED    Details   docusate sodium (COLACE) 100 mg capsule Take 100 mg by mouth two (2) times a day. multivitamin (ONE A DAY) tablet Take 1 Tab by mouth daily. Qty: 100 Tab, Refills: 5      traMADol (ULTRAM) 50 mg tablet Take 50 mg by mouth every six (6) hours as needed for Pain.      losartan (COZAAR) 50 mg tablet TAKE 1 TABLET BY MOUTH EVERY DAY  Qty: 90 Tab, Refills: 5    Associated Diagnoses: Uncontrolled hypertension      pregabalin (LYRICA) 50 mg capsule Take 1 Cap by mouth three (3) times daily. Max Daily Amount: 150 mg.  Qty: 90 Cap, Refills: 12    Associated Diagnoses: Diabetic polyneuropathy associated with type 2 diabetes mellitus (HCC)      polyethylene glycol (MIRALAX) 17 gram/dose powder Take 17 g by mouth daily. 1 tablespoon with 8 oz of water daily  Qty: 238 g, Refills: 0      metFORMIN (GLUCOPHAGE) 500 mg tablet Take 1 Tab by mouth two (2) times a day.   Qty: 180 Tab, Refills: 5    Associated Diagnoses: Diabetic polyneuropathy associated with type 2 diabetes mellitus ([facility]         STOP taking these medications       aspirin delayed-release 81 mg tablet Comments:   Reason for Stopping: pravastatin (PRAVACHOL) 40 mg tablet Comments:   Reason for Stopping:         raNITIdine (ZANTAC) 150 mg tablet Comments:   Reason for Stopping:               Signed:  Jessica Harrington NP  2/13/2019  8:13 AM

## 2019-02-13 NOTE — PROGRESS NOTES
[facility] CARDIOLOGY PROGRESS NOTE 
      
 
2/13/2019 7:21 AM 
 
Admit Date: 2/11/2019 Subjective:  
Patient denies any chest pain or dyspnea. Mild headache this AM. No focal neurologic symptoms. ROS: 
Cardiovascular:  As noted above Objective:  
  
Vitals:  
 02/12/19 2043 02/13/19 0130 02/13/19 0150 02/13/19 0448 BP: 121/87 114/67 105/54 127/75 Pulse: 86 73 68 74 Resp: 20 20  18 Temp: 98.2 °F (36.8 °C) 98.2 °F (36.8 °C)  97.5 °F (36.4 °C) SpO2: 98% 97%  93% Weight:    74.2 kg (163 lb 8 oz) Height:      
 
 
Physical Exam: 
General-No Acute Distress Neck- supple, no JVD 
CV- [facility] Lung- clear bilaterally Abd- soft, nontender, nondistended Ext- no edema bilaterally. Skin- warm and dry Data Review:  
Recent Labs  
  02/13/19 
0328 02/12/19 
0118 02/11/19 
1329  140 141  
K 3.5 3.5 3.5 MG 2.2  --  1.7*  
BUN 15 15 11 CREA 1.34 1.38 1.57* * 115* 172* WBC 10.3 8.1 6.4 HGB 16.3 16.7 16.1 HCT 47.9 47.3 46.4  173 167 TRIGL 161* 88  --   
HDL 37* 56  -- No results found for: AFSHAN Weinstein Assessment/Plan:  
 
Principal Problem: 
  Atrial fibrillation with RVR ([facility] (2/11/2019) Rate controlled and asymptomatic. Increase Lopressor and stop Cardizem. Monitor on telemetry today with activity. ON Xarelto and Plavix post PCI. RE-assess this PM.  Home later today or in AM. Active Problems: 
  Diabetes ([address] (9/12/2014) SSI and diabetic diet. HTN (hypertension) (9/12/2014) BP controlled. Hyperlipidemia () Lipitor 80 mg a day. Chest pain (2/11/2019) Resolved post PCI. Vomiting (2/11/2019) No recurrence during hospitalization. NSTEMI (non-ST elevated myocardial infarction) ([address] (2/11/2019) S/P PCI of distal circ. EF normal on echo.   
 
 
 
 
 
 
 
 
Courtney Shin MD 
2/13/2019 7:21 AM

## 2019-02-13 NOTE — DISCHARGE INSTRUCTIONS
DISPOSITION: The patient is being discharged home in stable condition on a low saturated fat, low cholesterol and low salt diet. The patient is instructed to advance activities as tolerated to the limit of fatigue or shortness of breath. The patient is instructed to call the office or return to the ER for immediate evaluation for any severe shortness of breath, chest pain, prolonged palpitations, near syncope or syncope. Patient Education        Atrial Fibrillation: Care Instructions  Your Care Instructions    Atrial fibrillation is an irregular and often fast heartbeat. Treating this condition is important for several reasons. It can cause blood clots, which can travel from your heart to your brain and cause a stroke. If you have a fast heartbeat, you may feel lightheaded, dizzy, and weak. An irregular heartbeat can also increase your risk for heart failure. Atrial fibrillation is often the result of another heart condition, such as high blood pressure or coronary artery disease. Making changes to improve your heart condition will help you stay healthy and active. Follow-up care is a key part of your treatment and safety. Be sure to make and go to all appointments, and call your doctor if you are having problems. It's also a good idea to know your test results and keep a list of the medicines you take. How can you care for yourself at home? Medicines    · Take your medicines exactly as prescribed. Call your doctor if you think you are having a problem with your medicine. You will get more details on the specific medicines your doctor prescribes.     · If your doctor has given you a blood thinner to prevent a stroke, be sure you get instructions about how to take your medicine safely. Blood thinners can cause serious bleeding problems.     · Do not take any vitamins, over-the-counter drugs, or herbal products without talking to your doctor first.    Lifestyle changes    · Do not smoke.  Smoking can increase your chance of a stroke and heart attack. If you need help quitting, talk to your doctor about stop-smoking programs and medicines. These can increase your chances of quitting for good.     · Eat a heart-healthy diet.     · Stay at a healthy weight. Lose weight if you need to.     · Limit alcohol to 2 drinks a day for men and 1 drink a day for women. Too much alcohol can cause health problems.     · Avoid colds and flu. Get a pneumococcal vaccine shot. If you have had one before, ask your doctor whether you need another dose. Get a flu shot every year. If you must be around people with colds or flu, wash your hands often. Activity    · If your doctor recommends it, get more exercise. Walking is a good choice. Bit by bit, increase the amount you walk every day. Try for at least 30 minutes on most days of the week. You also may want to swim, bike, or do other activities. Your doctor may suggest that you join a cardiac rehabilitation program so that you can have help increasing your physical activity safely.     · Start light exercise if your doctor says it is okay. Even a small amount will help you get stronger, have more energy, and manage stress. Walking is an easy way to get exercise. Start out by walking a little more than you did in the hospital. Gradually increase the amount you walk.     · When you exercise, watch for signs that your heart is working too hard. You are pushing too hard if you cannot talk while you are exercising. If you become short of breath or dizzy or have chest pain, sit down and rest immediately.     · Check your pulse regularly. Place two fingers on the artery at the palm side of your wrist, in line with your thumb. If your heartbeat seems uneven or fast, talk to your doctor. When should you call for help? Call 911 anytime you think you may need emergency care. For example, call if:    · You have symptoms of a heart attack. These may include:  ?  Chest pain or pressure, or a strange feeling in the chest.  ? Sweating. ? Shortness of breath. ? Nausea or vomiting. ? Pain, pressure, or a strange feeling in the back, neck, jaw, or upper belly or in one or both shoulders or arms. ? Lightheadedness or sudden weakness. ? A fast or irregular heartbeat. After you call 911, the  may tell you to chew 1 adult-strength or 2 to 4 low-dose aspirin. Wait for an ambulance. Do not try to drive yourself.     · You have symptoms of a stroke. These may include:  ? Sudden numbness, tingling, weakness, or loss of movement in your face, arm, or leg, especially on only one side of your body. ? Sudden vision changes. ? Sudden trouble speaking. ? Sudden confusion or trouble understanding simple statements. ? Sudden problems with walking or balance. ? A sudden, severe headache that is different from past headaches.     · You passed out (lost consciousness).    Call your doctor now or seek immediate medical care if:    · You have new or increased shortness of breath.     · You feel dizzy or lightheaded, or you feel like you may faint.     · Your heart rate becomes irregular.     · You can feel your heart flutter in your chest or skip heartbeats. Tell your doctor if these symptoms are new or worse.    Watch closely for changes in your health, and be sure to contact your doctor if you have any problems. Where can you learn more? Go to http://keon-gray.info/. Enter U020 in the search box to learn more about \"Atrial Fibrillation: Care Instructions. \"  Current as of: July 22, 2018  Content Version: 11.9  © 3069-1262 [employer]. Care instructions adapted under license by [employer] (which disclaims liability or warranty for this information). If you have questions about a medical condition or this instruction, always ask your healthcare professional. [address] any warranty or liability for your use of this information.   Patient Education Percutaneous Coronary Intervention: What to Expect at [facility]    Percutaneous coronary intervention (PCI) is the name for procedures that are used to open a narrowed or blocked coronary artery. The two most common PCI procedures are coronary angioplasty and coronary stent placement. Your groin or arm may have a bruise and feel sore for a day or two after a percutaneous coronary intervention (PCI). You can do light activities around the house, but nothing strenuous for several days. This care sheet gives you a general idea about how long it will take for you to recover. But each person recovers at a different pace. Follow the steps below to get better as quickly as possible. How can you care for yourself at home? Activity    · If the doctor gave you a sedative:  ? For 24 hours, don't do anything that requires attention to detail. It takes time for the medicine's effects to completely wear off.  ? For your safety, do not drive or operate any machinery that could be dangerous. Wait until the medicine wears off and you can think clearly and react easily.     · Do not do strenuous exercise and do not lift, pull, or push anything heavy until your doctor says it is okay. This may be for a day or two. You can walk around the house and do light activity, such as cooking.     · If the catheter was placed in your groin, try not to walk up stairs for the first couple of days.     · If the catheter was placed in your arm near your wrist, do not bend your wrist deeply for the first couple of days. Be careful using your hand to get into and out of a chair or bed.     · Carry your stent identification card with you at all times.     · If your doctor recommends it, get more exercise. Walking is a good choice. Bit by bit, increase the amount you walk every day. Try for at least 30 minutes on most days of the week. Diet    · Drink plenty of fluids to help your body flush out the dye.  If you have kidney, heart, or liver disease and have to limit fluids, talk with your doctor before you increase the amount of fluids you drink.     · Keep eating a heart-healthy diet that has lots of fruits, vegetables, and whole grains. If you have not been eating this way, talk to your doctor. You also may want to talk to a dietitian. This expert can help you to learn about healthy foods and plan meals. Medicines    · Your doctor will tell you if and when you can restart your medicines. He or she will also give you instructions about taking any new medicines.     · If you take blood thinners, such as warfarin (Coumadin), clopidogrel (Plavix), or aspirin, be sure to talk to your doctor. He or she will tell you if and when to start taking those medicines again. Make sure that you understand exactly what your doctor wants you to do.     · Your doctor will prescribe blood-thinning medicines. You will likely take aspirin plus another antiplatelet, such as clopidogrel (Plavix). It is very important that you take these medicines exactly as directed. These medicines help keep the coronary artery open and reduce your risk of a heart attack.     · Call your doctor if you think you are having a problem with your medicine.    Care of the catheter site    · For 1 or 2 days, keep a bandage over the spot where the catheter was inserted. The bandage probably will fall off in this time.     · Put ice or a cold pack on the area for 10 to 20 minutes at a time to help with soreness or swelling. Put a thin cloth between the ice and your skin.     · You may shower 24 to 48 hours after the procedure, if your doctor okays it. Pat the incision dry.     · Do not soak the catheter site until it is healed. Don't take a bath for 1 week, or until your doctor tells you it is okay.     · If you are bleeding, lie down and press on the area for 15 minutes to try to make it stop. If the bleeding does not stop, call your doctor or seek immediate medical care.    Follow-up care is a key part of your treatment and safety. Be sure to make and go to all appointments, and call your doctor if you are having problems. It's also a good idea to know your test results and keep a list of the medicines you take. When should you call for help? Call 911 anytime you think you may need emergency care. For example, call if:    · You passed out (lost consciousness).     · You have severe trouble breathing.     · You have sudden chest pain and shortness of breath, or you cough up blood.     · You have symptoms of a heart attack, such as:  ? Chest pain or pressure. ? Sweating. ? Shortness of breath. ? Nausea or vomiting. ? Pain that spreads from the chest to the neck, jaw, or one or both shoulders or arms. ? Dizziness or lightheadedness. ? A fast or uneven pulse. After calling 911, chew 1 adult-strength aspirin. Wait for an ambulance. Do not try to drive yourself.     · You have been diagnosed with angina, and you have angina symptoms that do not go away with rest or are not getting better within 5 minutes after you take one dose of nitroglycerin.    Call your doctor now or seek immediate medical care if:    · You are bleeding from the area where the catheter was put in your artery.     · You have a fast-growing, painful lump at the catheter site.     · You have signs of infection, such as:  ? Increased pain, swelling, warmth, or redness. ? Red streaks leading from the catheter site. ? Pus draining from the catheter site. ? A fever.     · Your leg or arm looks blue or feels cold, numb, or tingly.    Watch closely for changes in your health, and be sure to contact your doctor if you have any problems. Where can you learn more? Go to http://keon-gray.info/. Enter F832 in the search box to learn more about \"Percutaneous Coronary Intervention: What to Expect at Home. \"  Current as of: July 22, 2018  Content Version: 11.9  © 7569-8578 [employer], [facility].  Care instructions adapted under license by [employer] (which disclaims liability or warranty for this information). If you have questions about a medical condition or this instruction, always ask your healthcare professional. [address] any warranty or liability for your use of this information. Cardiac Catheterization/Angiography Discharge Instructions    *Check the puncture site frequently for swelling or bleeding. If you see any bleeding, lie down and apply pressure over the area with a clean town or washcloth. Notify your doctor for any redness, swelling, drainage or oozing from the puncture site. Notify your doctor for any fever or chills. *If the leg or arm with the puncture becomes cold, numb or painful, call [facility]iology at  [phone]. *Activity should be limited for the next 48 hours. Climb stairs as little as possible and avoid any stooping, bending or strenuous activity for 48 hours. No heavy lifting (anything over 10 pounds) for three days. *Do not drive for 48 hours. *You may resume your usual diet. Drink more fluids than usual.    *Have a responsible person drive you home and stay with you for at least 24 hours after your heart catheterization/angiography. *You may remove the bandage from your Right Arm in 24 hours. You may shower in 24 hours. No tub baths, hot tubs or swimming for one week. Do not place any lotions, creams, powders, ointments over the puncture site for one week. You may place a clean band-aid over the puncture site each day for 5 days. Change this daily. Patient Education        Heart-Healthy Diet: Care Instructions  Your Care Instructions    A heart-healthy diet has lots of vegetables, fruits, nuts, beans, and whole grains, and is low in salt. It limits foods that are high in saturated fat, such as meats, cheeses, and fried foods.  It may be hard to change your diet, but even small changes can lower your risk of heart attack and heart disease. Follow-up care is a key part of your treatment and safety. Be sure to make and go to all appointments, and call your doctor if you are having problems. It's also a good idea to know your test results and keep a list of the medicines you take. How can you care for yourself at home? Watch your portions  · Learn what a serving is. A \"serving\" and a \"portion\" are not always the same thing. Make sure that you are not eating larger portions than are recommended. For example, a serving of pasta is ½ cup. A serving size of meat is 2 to 3 ounces. A 3-ounce serving is about the size of a deck of cards. Measure serving sizes until you are good at [place]" them. Keep in mind that restaurants often serve portions that are 2 or 3 times the size of one serving. · To keep your energy level up and keep you from feeling hungry, eat often but in smaller portions. · Eat only the number of calories you need to stay at a healthy weight. If you need to lose weight, eat fewer calories than your body burns (through exercise and other physical activity). Eat more fruits and vegetables  · Eat a variety of fruit and vegetables every day. Dark green, deep orange, red, or yellow fruits and vegetables are especially good for you. Examples include spinach, carrots, peaches, and berries. · Keep carrots, celery, and other veggies handy for snacks. Buy fruit that is in season and store it where you can see it so that you will be tempted to eat it. · Cook dishes that have a lot of veggies in them, such as stir-fries and soups. Limit saturated and trans fat  · Read food labels, and try to avoid saturated and trans fats. They increase your risk of heart disease. Trans fat is found in many processed foods such as cookies and crackers. · Use olive or canola oil when you cook. Try cholesterol-lowering spreads, such as Benecol or Take Control.   · Bake, broil, grill, or steam foods instead of frying them.  · Choose lean meats instead of high-fat meats such as hot dogs and sausages. Cut off all visible fat when you prepare meat. · Eat fish, skinless poultry, and meat alternatives such as soy products instead of high-fat meats. Soy products, such as tofu, may be especially good for your heart. · Choose low-fat or fat-free milk and dairy products. Eat fish  · Eat at least two servings of fish a week. Certain fish, such as salmon and tuna, contain omega-3 fatty acids, which may help reduce your risk of heart attack. Eat foods high in fiber  · Eat a variety of grain products every day. Include whole-grain foods that have lots of fiber and nutrients. Examples of whole-grain foods include oats, whole wheat bread, and brown rice. · Buy whole-grain breads and cereals, instead of white bread or pastries. Limit salt and sodium  · Limit how much salt and sodium you eat to help lower your blood pressure. · Taste food before you salt it. Add only a little salt when you think you need it. With time, your taste buds will adjust to less salt. · Eat fewer snack items, fast foods, and other high-salt, processed foods. Check food labels for the amount of sodium in packaged foods. · Choose low-sodium versions of canned goods (such as soups, vegetables, and beans). Limit sugar  · Limit drinks and foods with added sugar. These include candy, desserts, and soda pop. Limit alcohol  · Limit alcohol to no more than 2 drinks a day for men and 1 drink a day for women. Too much alcohol can cause health problems. When should you call for help? Watch closely for changes in your health, and be sure to contact your doctor if:    · You would like help planning heart-healthy meals. Where can you learn more? Go to http://keon-gray.info/. Enter V137 in the search box to learn more about \"Heart-Healthy Diet: Care Instructions. \"  Current as of: July 22, 2018  Content Version: 11.9  © 3908-0220 [employer]. Care instructions adapted under license by [email or web address] (which disclaims liability or warranty for this information). If you have questions about a medical condition or this instruction, always ask your healthcare professional. [address] any warranty or liability for your use of this information. DISCHARGE SUMMARY from Nurse    PATIENT INSTRUCTIONS:    After general anesthesia or intravenous sedation, for 24 hours or while taking prescription Narcotics:  · Limit your activities  · Do not drive and operate hazardous machinery  · Do not make important personal or business decisions  · Do  not drink alcoholic beverages  · If you have not urinated within 8 hours after discharge, please contact your surgeon on call. Report the following to your surgeon:  · Excessive pain, swelling, redness or odor of or around the surgical area  · Temperature over 100.5  · Nausea and vomiting lasting longer than 4 hours or if unable to take medications  · Any signs of decreased circulation or nerve impairment to extremity: change in color, persistent  numbness, tingling, coldness or increase pain  · Any questions    What to do at Home:    *  Please give a list of your current medications to your Primary Care Provider. *  Please update this list whenever your medications are discontinued, doses are      changed, or new medications (including over-the-counter products) are added. *  Please carry medication information at all times in case of emergency situations. These are general instructions for a healthy lifestyle:    No smoking/ No tobacco products/ Avoid exposure to second hand smoke  Surgeon General's Warning:  Quitting smoking now greatly reduces serious risk to your health.     Obesity, smoking, and sedentary lifestyle greatly increases your risk for illness    A healthy diet, regular physical exercise & weight monitoring are important for maintaining a healthy lifestyle    You may be retaining fluid if you have a history of heart failure or if you experience any of the following symptoms:  Weight gain of 3 pounds or more overnight or 5 pounds in a week, increased swelling in our hands or feet or shortness of breath while lying flat in bed. Please call your doctor as soon as you notice any of these symptoms; do not wait until your next office visit. Recognize signs and symptoms of STROKE:    F-face looks uneven    A-arms unable to move or move unevenly    S-speech slurred or non-existent    T-time-call 911 as soon as signs and symptoms begin-DO NOT go       Back to bed or wait to see if you get better-TIME IS BRAIN. Warning Signs of HEART ATTACK     Call 911 if you have these symptoms:   Chest discomfort. Most heart attacks involve discomfort in the center of the chest that lasts more than a few minutes, or that goes away and comes back. It can feel like uncomfortable pressure, squeezing, fullness, or pain.  Discomfort in other areas of the upper body. Symptoms can include pain or discomfort in one or both arms, the back, neck, jaw, or stomach.  Shortness of breath with or without chest discomfort.  Other signs may include breaking out in a cold sweat, nausea, or lightheadedness. Don't wait more than five minutes to call 911 - MINUTES MATTER! Fast action can save your life. Calling 911 is almost always the fastest way to get lifesaving treatment. Emergency Medical Services staff can begin treatment when they arrive -- up to an hour sooner than if someone gets to the hospital by car. The discharge information has been reviewed with the patient. The patient verbalized understanding. Discharge medications reviewed with the patient and appropriate educational materials and side effects teaching were provided.   ___________________________________________________________________________________________________________________________________

## 2019-02-13 NOTE — PROGRESS NOTES
Cardiac Rehab: Spoke with patient regarding referral to cardiac rehab. Patient meets admission criteria based on NSTEMI with PCI (02/12/19). Written information about Cardiac Rehab given and reviewed with patient. Discussed lifestyle modifications to promote cardiac wellness. Patient indicated that he does not  want to participate in the cardiac rehab program due to time and travel requirements. I reviewed public transportation options with pt ans encouraged him to stop by to tour our facility when in for his MD follow up. His Cardiologist is Dr. Elmore Simmonds. Thank you, RONIT CedilloN, RN Cardiopulmonary Rehabilitation Nurse Liaison [facility]

## 2019-02-13 NOTE — PROGRESS NOTES
[facility] - INPATIENT Face to Face Encounter Patients Name: Jaden Flores    YOB: 1950 Ordering Physician: Yadira Jiménez MD  
 
Primary Diagnosis: Atrial fibrillation with RVR ([facility] [address] [I48.91] Date of Face to Face:   2/13/2019 Face to Face Encounter findings are related to primary reason for home care:   yes. 1. I certify that the patient needs intermittent care as follows: skilled nursing care:  skilled observation/assessment, patient education 
physical therapy: strengthening 2. I certify that this patient is homebound, that is: Patient's condition makes leaving the home medically contraindicated; and 3) patient has a normal inability to leave the home and leaving the home requires considerable and taxing effort. Patient may leave the home for infrequent and short duration for medical reasons, and occasional absences for non-medical reasons. Homebound status is due to the following functional limitations:  
 
3. I certify that this patient is under my care and that I, or a nurse practitioner or  [id number], or clinical nurse specialist, or certified nurse midwife, working with me, had a Face-to-Face Encounter that meets the physician Face-to-Face Encounter requirements. The following are the clinical findings from the [address] [place] [address] encounter that support the need for skilled services and is a summary of the encounter: 
 
See Progress notes and D/C summary Shayne Holstein, RN 
2/13/2019 THE FOLLOWING TO BE COMPLETED BY THE COMMUNITY PHYSICIAN: 
 
I concur with the findings described above from the F encounter that this patient is homebound and in need of a skilled service. Certifying Physician: _____________________________________ Printed Certifying Physician Name: _____________________________________ Date: _________________

## 2019-02-13 NOTE — PROGRESS NOTES
Discharge instructions were reviewed with patient. An opportunity was given for questions. All medications were reviewed, and information was given on the new medications - lipitor, lopressor, plavix, nitrostat, xarelto. Xarelto coupon  Given to patient. Patient verbalized understanding, and has no questions at this time. IV and telemetry monitor removed by primary RN.

## 2019-02-13 NOTE — PROGRESS NOTES
Bedside and Verbal shift change report given to self (oncoming nurse) by Clayton Davidson RN (offgoing nurse). Report included the following information SBAR, Kardex, MAR and Recent Results.

## 2019-02-15 ENCOUNTER — HOME CARE VISIT (OUTPATIENT)
Dept: SCHEDULING | Facility: HOME HEALTH | Age: 69
End: 2019-02-15
Payer: COMMERCIAL

## 2019-02-15 PROCEDURE — 400013 HH SOC

## 2019-02-15 PROCEDURE — 3331090001 HH PPS REVENUE CREDIT

## 2019-02-15 PROCEDURE — 3331090002 HH PPS REVENUE DEBIT

## 2019-02-15 PROCEDURE — G0299 HHS/HOSPICE OF RN EA 15 MIN: HCPCS

## 2019-02-16 PROCEDURE — 3331090002 HH PPS REVENUE DEBIT

## 2019-02-16 PROCEDURE — 3331090001 HH PPS REVENUE CREDIT

## 2019-02-17 VITALS
OXYGEN SATURATION: 96 % | HEART RATE: 56 BPM | DIASTOLIC BLOOD PRESSURE: 62 MMHG | SYSTOLIC BLOOD PRESSURE: 110 MMHG | TEMPERATURE: 97 F | RESPIRATION RATE: 16 BRPM

## 2019-02-17 PROCEDURE — 3331090002 HH PPS REVENUE DEBIT

## 2019-02-17 PROCEDURE — 3331090001 HH PPS REVENUE CREDIT

## 2019-02-18 PROCEDURE — 3331090002 HH PPS REVENUE DEBIT

## 2019-02-18 PROCEDURE — 3331090001 HH PPS REVENUE CREDIT

## 2019-02-19 ENCOUNTER — HOME CARE VISIT (OUTPATIENT)
Dept: SCHEDULING | Facility: HOME HEALTH | Age: 69
End: 2019-02-19
Payer: COMMERCIAL

## 2019-02-19 VITALS
HEART RATE: 84 BPM | OXYGEN SATURATION: 97 % | DIASTOLIC BLOOD PRESSURE: 60 MMHG | SYSTOLIC BLOOD PRESSURE: 100 MMHG | TEMPERATURE: 99.5 F | RESPIRATION RATE: 20 BRPM

## 2019-02-19 PROBLEM — I25.10 CORONARY ARTERY DISEASE INVOLVING NATIVE CORONARY ARTERY OF NATIVE HEART: Status: ACTIVE | Noted: 2019-02-19

## 2019-02-19 PROCEDURE — 3331090002 HH PPS REVENUE DEBIT

## 2019-02-19 PROCEDURE — G0299 HHS/HOSPICE OF RN EA 15 MIN: HCPCS

## 2019-02-19 PROCEDURE — 3331090001 HH PPS REVENUE CREDIT

## 2019-02-20 ENCOUNTER — TELEPHONE (OUTPATIENT)
Dept: CARDIAC REHAB | Age: 69
End: 2019-02-20

## 2019-02-20 PROCEDURE — 3331090001 HH PPS REVENUE CREDIT

## 2019-02-20 PROCEDURE — 3331090002 HH PPS REVENUE DEBIT

## 2019-02-21 ENCOUNTER — HOME CARE VISIT (OUTPATIENT)
Dept: HOME HEALTH SERVICES | Facility: HOME HEALTH | Age: 69
End: 2019-02-21
Payer: COMMERCIAL

## 2019-02-21 ENCOUNTER — HOME CARE VISIT (OUTPATIENT)
Dept: SCHEDULING | Facility: HOME HEALTH | Age: 69
End: 2019-02-21
Payer: COMMERCIAL

## 2019-02-21 VITALS
HEART RATE: 72 BPM | TEMPERATURE: 98.5 F | OXYGEN SATURATION: 98 % | RESPIRATION RATE: 18 BRPM | SYSTOLIC BLOOD PRESSURE: 120 MMHG | DIASTOLIC BLOOD PRESSURE: 60 MMHG

## 2019-02-21 VITALS
RESPIRATION RATE: 18 BRPM | TEMPERATURE: 98.6 F | HEART RATE: 74 BPM | DIASTOLIC BLOOD PRESSURE: 68 MMHG | SYSTOLIC BLOOD PRESSURE: 123 MMHG

## 2019-02-21 PROCEDURE — 3331090002 HH PPS REVENUE DEBIT

## 2019-02-21 PROCEDURE — G0151 HHCP-SERV OF PT,EA 15 MIN: HCPCS

## 2019-02-21 PROCEDURE — G0299 HHS/HOSPICE OF RN EA 15 MIN: HCPCS

## 2019-02-21 PROCEDURE — 3331090001 HH PPS REVENUE CREDIT

## 2019-02-22 ENCOUNTER — HOME CARE VISIT (OUTPATIENT)
Dept: HOME HEALTH SERVICES | Facility: HOME HEALTH | Age: 69
End: 2019-02-22
Payer: COMMERCIAL

## 2019-02-22 PROCEDURE — 3331090002 HH PPS REVENUE DEBIT

## 2019-02-22 PROCEDURE — 3331090001 HH PPS REVENUE CREDIT

## 2019-02-23 PROCEDURE — 3331090001 HH PPS REVENUE CREDIT

## 2019-02-23 PROCEDURE — 3331090002 HH PPS REVENUE DEBIT

## 2019-02-24 PROCEDURE — 3331090002 HH PPS REVENUE DEBIT

## 2019-02-24 PROCEDURE — 3331090001 HH PPS REVENUE CREDIT

## 2019-02-25 PROCEDURE — 3331090002 HH PPS REVENUE DEBIT

## 2019-02-25 PROCEDURE — 3331090001 HH PPS REVENUE CREDIT

## 2019-02-26 ENCOUNTER — HOME CARE VISIT (OUTPATIENT)
Dept: SCHEDULING | Facility: HOME HEALTH | Age: 69
End: 2019-02-26
Payer: COMMERCIAL

## 2019-02-26 VITALS
HEART RATE: 62 BPM | SYSTOLIC BLOOD PRESSURE: 136 MMHG | OXYGEN SATURATION: 94 % | RESPIRATION RATE: 16 BRPM | TEMPERATURE: 98.5 F | DIASTOLIC BLOOD PRESSURE: 84 MMHG

## 2019-02-26 PROCEDURE — 3331090001 HH PPS REVENUE CREDIT

## 2019-02-26 PROCEDURE — G0151 HHCP-SERV OF PT,EA 15 MIN: HCPCS

## 2019-02-26 PROCEDURE — 3331090002 HH PPS REVENUE DEBIT

## 2019-02-27 ENCOUNTER — HOME CARE VISIT (OUTPATIENT)
Dept: SCHEDULING | Facility: HOME HEALTH | Age: 69
End: 2019-02-27
Payer: COMMERCIAL

## 2019-02-27 PROCEDURE — 3331090002 HH PPS REVENUE DEBIT

## 2019-02-27 PROCEDURE — 3331090001 HH PPS REVENUE CREDIT

## 2019-02-28 PROCEDURE — 3331090001 HH PPS REVENUE CREDIT

## 2019-02-28 PROCEDURE — 3331090002 HH PPS REVENUE DEBIT

## 2019-03-01 ENCOUNTER — HOME CARE VISIT (OUTPATIENT)
Dept: HOME HEALTH SERVICES | Facility: HOME HEALTH | Age: 69
End: 2019-03-01
Payer: COMMERCIAL

## 2019-03-01 ENCOUNTER — HOME CARE VISIT (OUTPATIENT)
Dept: SCHEDULING | Facility: HOME HEALTH | Age: 69
End: 2019-03-01
Payer: COMMERCIAL

## 2019-03-01 PROCEDURE — 3331090002 HH PPS REVENUE DEBIT

## 2019-03-01 PROCEDURE — 3331090001 HH PPS REVENUE CREDIT

## 2019-03-02 PROCEDURE — 3331090002 HH PPS REVENUE DEBIT

## 2019-03-02 PROCEDURE — 3331090001 HH PPS REVENUE CREDIT

## 2019-03-03 PROCEDURE — 3331090001 HH PPS REVENUE CREDIT

## 2019-03-03 PROCEDURE — 3331090002 HH PPS REVENUE DEBIT

## 2019-03-04 ENCOUNTER — HOME CARE VISIT (OUTPATIENT)
Dept: SCHEDULING | Facility: HOME HEALTH | Age: 69
End: 2019-03-04
Payer: COMMERCIAL

## 2019-03-04 VITALS
DIASTOLIC BLOOD PRESSURE: 82 MMHG | TEMPERATURE: 98.2 F | SYSTOLIC BLOOD PRESSURE: 134 MMHG | HEART RATE: 66 BPM | RESPIRATION RATE: 16 BRPM

## 2019-03-04 PROCEDURE — 3331090002 HH PPS REVENUE DEBIT

## 2019-03-04 PROCEDURE — G0151 HHCP-SERV OF PT,EA 15 MIN: HCPCS

## 2019-03-04 PROCEDURE — 3331090001 HH PPS REVENUE CREDIT

## 2019-03-05 ENCOUNTER — HOME CARE VISIT (OUTPATIENT)
Dept: HOME HEALTH SERVICES | Facility: HOME HEALTH | Age: 69
End: 2019-03-05
Payer: COMMERCIAL

## 2019-03-05 ENCOUNTER — HOME CARE VISIT (OUTPATIENT)
Dept: SCHEDULING | Facility: HOME HEALTH | Age: 69
End: 2019-03-05
Payer: COMMERCIAL

## 2019-03-05 PROCEDURE — 3331090002 HH PPS REVENUE DEBIT

## 2019-03-05 PROCEDURE — 3331090001 HH PPS REVENUE CREDIT

## 2019-03-06 ENCOUNTER — HOSPITAL ENCOUNTER (EMERGENCY)
Age: 69
Discharge: HOME | End: 2019-03-06
Attending: EMERGENCY MEDICINE
Payer: COMMERCIAL

## 2019-03-06 ENCOUNTER — HOME CARE VISIT (OUTPATIENT)
Dept: SCHEDULING | Facility: HOME HEALTH | Age: 69
End: 2019-03-06
Payer: COMMERCIAL

## 2019-03-06 VITALS
HEIGHT: 66 IN | HEART RATE: 87 BPM | WEIGHT: 175 LBS | RESPIRATION RATE: 20 BRPM | SYSTOLIC BLOOD PRESSURE: 165 MMHG | BODY MASS INDEX: 28.12 KG/M2 | TEMPERATURE: 98.1 F | DIASTOLIC BLOOD PRESSURE: 91 MMHG | OXYGEN SATURATION: 98 %

## 2019-03-06 VITALS
DIASTOLIC BLOOD PRESSURE: 78 MMHG | OXYGEN SATURATION: 98 % | RESPIRATION RATE: 18 BRPM | HEART RATE: 87 BPM | TEMPERATURE: 99.1 F | SYSTOLIC BLOOD PRESSURE: 142 MMHG

## 2019-03-06 DIAGNOSIS — R07.9 ACUTE CHEST PAIN: Primary | ICD-10-CM

## 2019-03-06 DIAGNOSIS — F10.920 ACUTE ALCOHOLIC INTOXICATION WITHOUT COMPLICATION (HCC): ICD-10-CM

## 2019-03-06 DIAGNOSIS — K21.9 GASTROESOPHAGEAL REFLUX DISEASE, ESOPHAGITIS PRESENCE NOT SPECIFIED: ICD-10-CM

## 2019-03-06 LAB
ALBUMIN SERPL-MCNC: 4.2 G/DL (ref 3.2–4.6)
ALBUMIN/GLOB SERPL: 1.1 {RATIO} (ref 1.2–3.5)
ALP SERPL-CCNC: 122 U/L (ref 50–136)
ALT SERPL-CCNC: 27 U/L (ref 12–65)
AMPHET UR QL SCN: NEGATIVE
ANION GAP SERPL CALC-SCNC: 10 MMOL/L (ref 7–16)
AST SERPL-CCNC: 30 U/L (ref 15–37)
ATRIAL RATE: 77 BPM
BARBITURATES UR QL SCN: NEGATIVE
BASOPHILS # BLD: 0.1 K/UL (ref 0–0.2)
BASOPHILS NFR BLD: 1 % (ref 0–2)
BENZODIAZ UR QL: NEGATIVE
BILIRUB SERPL-MCNC: 1.3 MG/DL (ref 0.2–1.1)
BUN SERPL-MCNC: 9 MG/DL (ref 8–23)
CALCIUM SERPL-MCNC: 9.2 MG/DL (ref 8.3–10.4)
CALCULATED P AXIS, ECG09: 51 DEGREES
CALCULATED R AXIS, ECG10: 1 DEGREES
CALCULATED T AXIS, ECG11: 15 DEGREES
CANNABINOIDS UR QL SCN: NEGATIVE
CHLORIDE SERPL-SCNC: 108 MMOL/L (ref 98–107)
CO2 SERPL-SCNC: 25 MMOL/L (ref 21–32)
COCAINE UR QL SCN: NEGATIVE
CREAT SERPL-MCNC: 1.49 MG/DL (ref 0.8–1.5)
DIAGNOSIS, 93000: NORMAL
DIFFERENTIAL METHOD BLD: NORMAL
EOSINOPHIL # BLD: 0 K/UL (ref 0–0.8)
EOSINOPHIL NFR BLD: 1 % (ref 0.5–7.8)
ERYTHROCYTE [DISTWIDTH] IN BLOOD BY AUTOMATED COUNT: 14 % (ref 11.9–14.6)
ETHANOL SERPL-MCNC: 260 MG/DL
GLOBULIN SER CALC-MCNC: 4 G/DL (ref 2.3–3.5)
GLUCOSE SERPL-MCNC: 104 MG/DL (ref 65–100)
HCT VFR BLD AUTO: 49.8 % (ref 41.1–50.3)
HGB BLD-MCNC: 17.2 G/DL (ref 13.6–17.2)
IMM GRANULOCYTES # BLD AUTO: 0 K/UL (ref 0–0.5)
IMM GRANULOCYTES NFR BLD AUTO: 0 % (ref 0–5)
LACTATE BLD-SCNC: 3.39 MMOL/L (ref 0.5–1.9)
LYMPHOCYTES # BLD: 1.8 K/UL (ref 0.5–4.6)
LYMPHOCYTES NFR BLD: 39 % (ref 13–44)
MAGNESIUM SERPL-MCNC: 2.2 MG/DL (ref 1.8–2.4)
MCH RBC QN AUTO: 32.3 PG (ref 26.1–32.9)
MCHC RBC AUTO-ENTMCNC: 34.5 G/DL (ref 31.4–35)
MCV RBC AUTO: 93.4 FL (ref 79.6–97.8)
METHADONE UR QL: NEGATIVE
MONOCYTES # BLD: 0.3 K/UL (ref 0.1–1.3)
MONOCYTES NFR BLD: 7 % (ref 4–12)
NEUTS SEG # BLD: 2.3 K/UL (ref 1.7–8.2)
NEUTS SEG NFR BLD: 52 % (ref 43–78)
NRBC # BLD: 0 K/UL (ref 0–0.2)
OPIATES UR QL: NEGATIVE
P-R INTERVAL, ECG05: 134 MS
PCP UR QL: NEGATIVE
PLATELET # BLD AUTO: 159 K/UL (ref 150–450)
PMV BLD AUTO: 10.6 FL (ref 9.4–12.3)
POTASSIUM SERPL-SCNC: 3.7 MMOL/L (ref 3.5–5.1)
PROT SERPL-MCNC: 8.2 G/DL (ref 6.3–8.2)
Q-T INTERVAL, ECG07: 388 MS
QRS DURATION, ECG06: 118 MS
QTC CALCULATION (BEZET), ECG08: 439 MS
RBC # BLD AUTO: 5.33 M/UL (ref 4.23–5.6)
SODIUM SERPL-SCNC: 143 MMOL/L (ref 136–145)
TROPONIN I BLD-MCNC: 0 NG/ML (ref 0.02–0.05)
TROPONIN I BLD-MCNC: 0.01 NG/ML (ref 0.02–0.05)
VENTRICULAR RATE, ECG03: 77 BPM
WBC # BLD AUTO: 4.6 K/UL (ref 4.3–11.1)

## 2019-03-06 PROCEDURE — 83735 ASSAY OF MAGNESIUM: CPT

## 2019-03-06 PROCEDURE — 80307 DRUG TEST PRSMV CHEM ANLYZR: CPT

## 2019-03-06 PROCEDURE — 99285 EMERGENCY DEPT VISIT HI MDM: CPT | Performed by: EMERGENCY MEDICINE

## 2019-03-06 PROCEDURE — G0151 HHCP-SERV OF PT,EA 15 MIN: HCPCS

## 2019-03-06 PROCEDURE — 96375 TX/PRO/DX INJ NEW DRUG ADDON: CPT | Performed by: EMERGENCY MEDICINE

## 2019-03-06 PROCEDURE — 84484 ASSAY OF TROPONIN QUANT: CPT

## 2019-03-06 PROCEDURE — 96374 THER/PROPH/DIAG INJ IV PUSH: CPT | Performed by: EMERGENCY MEDICINE

## 2019-03-06 PROCEDURE — 83605 ASSAY OF LACTIC ACID: CPT

## 2019-03-06 PROCEDURE — 74011250636 HC RX REV CODE- 250/636: Performed by: EMERGENCY MEDICINE

## 2019-03-06 PROCEDURE — 80053 COMPREHEN METABOLIC PANEL: CPT

## 2019-03-06 PROCEDURE — 81003 URINALYSIS AUTO W/O SCOPE: CPT | Performed by: EMERGENCY MEDICINE

## 2019-03-06 PROCEDURE — 3331090002 HH PPS REVENUE DEBIT

## 2019-03-06 PROCEDURE — 93005 ELECTROCARDIOGRAM TRACING: CPT | Performed by: EMERGENCY MEDICINE

## 2019-03-06 PROCEDURE — 85025 COMPLETE CBC W/AUTO DIFF WBC: CPT

## 2019-03-06 PROCEDURE — 3331090001 HH PPS REVENUE CREDIT

## 2019-03-06 RX ORDER — RANITIDINE 150 MG/1
150 TABLET, FILM COATED ORAL 2 TIMES DAILY
Qty: 60 TAB | Refills: 2 | Status: SHIPPED | OUTPATIENT
Start: 2019-03-06

## 2019-03-06 RX ORDER — FAMOTIDINE 10 MG/ML
20 INJECTION INTRAVENOUS
Status: COMPLETED | OUTPATIENT
Start: 2019-03-06 | End: 2019-03-06

## 2019-03-06 RX ORDER — THIAMINE HYDROCHLORIDE 100 MG/ML
100 INJECTION, SOLUTION INTRAMUSCULAR; INTRAVENOUS
Status: COMPLETED | OUTPATIENT
Start: 2019-03-06 | End: 2019-03-06

## 2019-03-06 RX ADMIN — SODIUM CHLORIDE 1000 ML: 900 INJECTION, SOLUTION INTRAVENOUS at 02:29

## 2019-03-06 RX ADMIN — THIAMINE HYDROCHLORIDE 100 MG: 100 INJECTION, SOLUTION INTRAMUSCULAR; INTRAVENOUS at 02:29

## 2019-03-06 RX ADMIN — FAMOTIDINE 20 MG: 10 INJECTION, SOLUTION INTRAVENOUS at 02:29

## 2019-03-06 NOTE — ED TRIAGE NOTES
Pt arrives from home via [facility]. Per ems originally called out for chest pain however upon their arrival pt altered with slurred speech. A/o x3 on arrival.   Reports left sided chest pain however states resolved on arrival. Denies other complaints. Seen here with admission 2/11 for afib and elevated troponin. +etoh. Arrives with knee immobilizer to left lower extremity.  bgl 201 with ems

## 2019-03-06 NOTE — ED PROVIDER NOTES
Per nurse's notes: \"Pt arrives from home via [facility]. Per ems originally called out for chest pain however upon their arrival pt altered with slurred speech. A/o x3 on arrival.   Reports left sided chest pain however states resolved on arrival. Denies other complaints. Seen here with admission 2/11 for afib and elevated troponin. +etoh. Arrives with knee immobilizer to left lower extremity. bgl 201 with ems\"      The history is provided by the patient and the EMS personnel. Altered mental status    This is a new problem. Episode onset: unclear. The problem has not changed since onset. Associated symptoms include agitation. Pertinent negatives include no confusion, no somnolence, no seizures, no unresponsiveness, no weakness, no delusions, no hallucinations, no self-injury, no violence, no tingling and no numbness. Mental status baseline is normal.  Risk factors include alcohol intake. His past medical history is significant for diabetes, liver disease, hypertension and depression. His past medical history does not include seizures, CVA, TIA, COPD, dementia, head trauma or heart disease. Past Medical History:   Diagnosis Date    Anxiety     Dermatophytosis of nail     Diabetes ([facility] [address] 9/12/2014    Diabetic polyneuropathy ([facility] [address]     Heart murmur     AORTIC SYSTOLIC FLOW MURMUR, [address]    Hepatic encephalopathy ([address] 5/5/2014    Hyperlipidemia     Hypertension     Metatarsal stress fracture     Metatarsalgia     Osteoarthritis     Peripheral edema     Psychiatric disorder     depression    UTI (urinary tract infection)        Past Surgical History:   Procedure Laterality Date    HX HERNIA REPAIR  2013    HX LAPAROTOMY  2012    ABDOMINAL SURGERY     HX OTHER SURGICAL      cyst removed from back x 1 week    NEUROLOGICAL PROCEDURE UNLISTED      plate in  head. .         Family History:   Problem Relation Age of Onset    Colon Cancer Mother    [facility] Stroke Father     Hypertension Sister Social History     Socioeconomic History    Marital status:      Spouse name: Not on file    Number of children: Not on file    Years of education: Not on file    Highest education level: Not on file   Social Needs    Financial resource strain: Not on file    Food insecurity - worry: Not on file    Food insecurity - inability: Not on file   [employer] needs - medical: Not on file   [employer] needs - non-medical: Not on file   Occupational History    Not on file   Tobacco Use    Smoking status: Current Some Day Smoker    Smokeless tobacco: Never Used    Tobacco comment: LIGHT TOBACCO SMOKER 1-2 A DAY    Substance and Sexual Activity    Alcohol use: No     Alcohol/week: 0.0 oz     Frequency: Never    Drug use: No    Sexual activity: Not Currently   Other Topics Concern    Not on file   Social History Narrative    Not on file         ALLERGIES: Pcn [penicillins]    Review of Systems   Constitutional: Negative for chills and fever. Cardiovascular: Positive for chest pain. Negative for palpitations and leg swelling. Musculoskeletal: Positive for arthralgias (states he is wearing his knee immobilizer on his lower extremity for a crack in the bone to his left lower extremity). Skin: Negative for wound. Neurological: Negative for tingling, seizures, weakness and numbness. Psychiatric/Behavioral: Positive for agitation. Negative for confusion, hallucinations and self-injury. All other systems reviewed and are negative. Vitals:    03/06/19 0045   BP: (!) 175/102   Pulse: 80   Resp: 20   Temp: 98.1 °F (36.7 °C)   SpO2: 98%   Weight: 79.4 kg (175 lb)   Height: 5' 6\" (1.676 m)            Physical Exam   Constitutional: He is oriented to person, place, and time. He appears well-developed and well-nourished. No distress. HENT:   Head: Normocephalic and atraumatic.    Right Ear: External ear normal.   Left Ear: External ear normal.   Mouth/Throat: Oropharynx is clear and moist.   Eyes: Conjunctivae and EOM are normal. Pupils are equal, round, and reactive to light. Neck: Normal range of motion. Neck supple. Cardiovascular: Normal rate, regular rhythm, normal heart sounds and intact distal pulses. Pulmonary/Chest: Effort normal and breath sounds normal.   Abdominal: Soft. Bowel sounds are normal. There is no tenderness. Musculoskeletal: Normal range of motion. He exhibits no edema. Neurological: He is alert and oriented to person, place, and time. He has normal strength. He is not disoriented. No cranial nerve deficit or sensory deficit. Gait (mildly unsteady) abnormal.   Skin: Skin is warm and dry. Capillary refill takes less than 2 seconds. Psychiatric: He has a normal mood and affect. Thought content normal. His speech is slurred (mild). He is agitated (mild). Cognition and memory are normal.   Nursing note and vitals reviewed. MDM  Number of Diagnoses or Management Options  Acute alcoholic intoxication without complication [facility]): new and requires workup  Acute chest pain: new and requires workup  Gastroesophageal reflux disease, esophagitis presence not specified: new and requires workup     Amount and/or Complexity of Data Reviewed  Clinical lab tests: ordered and reviewed  Review and summarize past medical records: yes  Independent visualization of images, tracings, or specimens: yes    Risk of Complications, Morbidity, and/or Mortality  Presenting problems: high  Diagnostic procedures: minimal  Management options: moderate    Patient Progress  Patient progress: improved         Procedures    The patient was observed in the ED. Patient will be screened with a second troponin level, if negative plan discharge patient home with follow-up with his family doctor as well as place the patient on aa acid reducer.     Results Reviewed:      Recent Results (from the past 24 hour(s))   CBC WITH AUTOMATED DIFF    Collection Time: 03/06/19  1:04 AM   Result Value Ref Range    WBC 4.6 4.3 - 11.1 K/uL    RBC 5.33 4.23 - 5.6 M/uL    HGB 17.2 13.6 - 17.2 g/dL    HCT 49.8 41.1 - 50.3 %    MCV 93.4 79.6 - 97.8 FL    MCH 32.3 26.1 - 32.9 PG    MCHC 34.5 31.4 - 35.0 g/dL    RDW 14.0 11.9 - 14.6 %    PLATELET 781 975 - 158 K/uL    MPV 10.6 9.4 - 12.3 FL    ABSOLUTE NRBC 0.00 0.0 - 0.2 K/uL    DF AUTOMATED      NEUTROPHILS 52 43 - 78 %    LYMPHOCYTES 39 13 - 44 %    MONOCYTES 7 4.0 - 12.0 %    EOSINOPHILS 1 0.5 - 7.8 %    BASOPHILS 1 0.0 - 2.0 %    IMMATURE GRANULOCYTES 0 0.0 - 5.0 %    ABS. NEUTROPHILS 2.3 1.7 - 8.2 K/UL    ABS. LYMPHOCYTES 1.8 0.5 - 4.6 K/UL    ABS. MONOCYTES 0.3 0.1 - 1.3 K/UL    ABS. EOSINOPHILS 0.0 0.0 - 0.8 K/UL    ABS. BASOPHILS 0.1 0.0 - 0.2 K/UL    ABS. IMM. GRANS. 0.0 0.0 - 0.5 K/UL   METABOLIC PANEL, COMPREHENSIVE    Collection Time: 03/06/19  1:04 AM   Result Value Ref Range    Sodium 143 136 - 145 mmol/L    Potassium 3.7 3.5 - 5.1 mmol/L    Chloride 108 (H) 98 - 107 mmol/L    CO2 25 21 - 32 mmol/L    Anion gap 10 7 - 16 mmol/L    Glucose 104 (H) 65 - 100 mg/dL    BUN 9 8 - 23 MG/DL    Creatinine 1.49 0.8 - 1.5 MG/DL    GFR est AA >60 >60 ml/min/1.73m2    GFR est non-AA 50 (L) >60 ml/min/1.73m2    Calcium 9.2 8.3 - 10.4 MG/DL    Bilirubin, total 1.3 (H) 0.2 - 1.1 MG/DL    ALT (SGPT) 27 12 - 65 U/L    AST (SGOT) 30 15 - 37 U/L    Alk.  phosphatase 122 50 - 136 U/L    Protein, total 8.2 6.3 - 8.2 g/dL    Albumin 4.2 3.2 - 4.6 g/dL    Globulin 4.0 (H) 2.3 - 3.5 g/dL    A-G Ratio 1.1 (L) 1.2 - 3.5     ETHYL ALCOHOL    Collection Time: 03/06/19  1:04 AM   Result Value Ref Range    ALCOHOL(ETHYL),SERUM 260 MG/DL   POC TROPONIN-I    Collection Time: 03/06/19  1:28 AM   Result Value Ref Range    Troponin-I (POC) 0.01 (L) 0.02 - 0.05 ng/ml   DRUG SCREEN, URINE    Collection Time: 03/06/19  1:29 AM   Result Value Ref Range    PCP(PHENCYCLIDINE) NEGATIVE       BENZODIAZEPINES NEGATIVE       COCAINE NEGATIVE       AMPHETAMINES NEGATIVE       METHADONE NEGATIVE       THC (TH-CANNABINOL) NEGATIVE       OPIATES NEGATIVE       BARBITURATES NEGATIVE      POC LACTIC ACID    Collection Time: 03/06/19  1:30 AM   Result Value Ref Range    Lactic Acid (POC) 3.39 (H) 0.5 - 1.9 mmol/L         I discussed the results of all labs, procedures, radiographs, and treatments with the patient and available family. Treatment plan is agreed upon and the patient is ready for discharge. All voiced understanding of the discharge plan and medication instructions or changes as appropriate. Questions about treatment in the ED were answered. All were encouraged to return should symptoms worsen or new problems develop. Dictated using voice recognition software.  Proofread, but unrecognized errors may exist.

## 2019-03-06 NOTE — DISCHARGE INSTRUCTIONS
Patient Education        Chest Pain: Care Instructions  Your Care Instructions    There are many things that can cause chest pain. Some are not serious and will get better on their own in a few days. But some kinds of chest pain need more testing and treatment. Your doctor may have recommended a follow-up visit in the next 8 to 12 hours. If you are not getting better, you may need more tests or treatment. Even though your doctor has released you, you still need to watch for any problems. The doctor carefully checked you, but sometimes problems can develop later. If you have new symptoms or if your symptoms do not get better, get medical care right away. If you have worse or different chest pain or pressure that lasts more than 5 minutes or you passed out (lost consciousness), call 911 or seek other emergency help right away. A medical visit is only one step in your treatment. Even if you feel better, you still need to do what your doctor recommends, such as going to all suggested follow-up appointments and taking medicines exactly as directed. This will help you recover and help prevent future problems. How can you care for yourself at home? · Rest until you feel better. · Take your medicine exactly as prescribed. Call your doctor if you think you are having a problem with your medicine. · Do not drive after taking a prescription pain medicine. When should you call for help? Call 911 if:    · You passed out (lost consciousness).     · You have severe difficulty breathing.     · You have symptoms of a heart attack. These may include:  ? Chest pain or pressure, or a strange feeling in your chest.  ? Sweating. ? Shortness of breath. ? Nausea or vomiting. ? Pain, pressure, or a strange feeling in your back, neck, jaw, or upper belly or in one or both shoulders or arms. ? Lightheadedness or sudden weakness. ? A fast or irregular heartbeat.   After you call 911, the  may tell you to chew 1 adult-strength or 2 to 4 low-dose aspirin. Wait for an ambulance. Do not try to drive yourself.    Call your doctor today if:    · You have any trouble breathing.     · Your chest pain gets worse.     · You are dizzy or lightheaded, or you feel like you may faint.     · You are not getting better as expected.     · You are having new or different chest pain. Where can you learn more? Go to http://keon-gray.info/. Enter A120 in the search box to learn more about \"Chest Pain: Care Instructions. \"  Current as of: September 23, 2018  Content Version: 11.9  © 7610-6664 [employer]. Care instructions adapted under license by [employer] (which disclaims liability or warranty for this information). If you have questions about a medical condition or this instruction, always ask your healthcare professional. [place] [address] any warranty or liability for your use of this information. Patient Education        Gastroesophageal Reflux Disease (GERD): Care Instructions  Your Care Instructions    Gastroesophageal reflux disease (GERD) is the backward flow of stomach acid into the esophagus. The esophagus is the tube that leads from your throat to your stomach. A one-way valve prevents the stomach acid from moving up into this tube. When you have GERD, this valve does not close tightly enough. If you have mild GERD symptoms including heartburn, you may be able to control the problem with antacids or over-the-counter medicine. Changing your diet, losing weight, and making other lifestyle changes can also help reduce symptoms. Follow-up care is a key part of your treatment and safety. Be sure to make and go to all appointments, and call your doctor if you are having problems. It's also a good idea to know your test results and keep a list of the medicines you take. How can you care for yourself at home? · Take your medicines exactly as prescribed.  Call your doctor if you think you are having a problem with your medicine. · Your doctor may recommend over-the-counter medicine. For mild or occasional indigestion, antacids, such as Tums, Gaviscon, Mylanta, or Maalox, may help. Your doctor also may recommend over-the-counter acid reducers, such as Pepcid AC, Tagamet HB, Zantac 75, or Prilosec. Read and follow all instructions on the label. If you use these medicines often, talk with your doctor. · Change your eating habits. ? It's best to eat several small meals instead of two or three large meals. ? After you eat, wait 2 to 3 hours before you lie down. ? Chocolate, mint, and alcohol can make GERD worse. ? Spicy foods, foods that have a lot of acid (like tomatoes and oranges), and coffee can make GERD symptoms worse in some people. If your symptoms are worse after you eat a certain food, you may want to stop eating that food to see if your symptoms get better. · Do not smoke or chew tobacco. Smoking can make GERD worse. If you need help quitting, talk to your doctor about stop-smoking programs and medicines. These can increase your chances of quitting for good. · If you have GERD symptoms at night, raise the head of your bed 6 to 8 inches by putting the frame on blocks or placing a foam wedge under the head of your mattress. (Adding extra pillows does not work.)  · Do not wear tight clothing around your middle. · Lose weight if you need to. Losing just 5 to 10 pounds can help. When should you call for help? Call your doctor now or seek immediate medical care if:    · You have new or different belly pain.     · Your stools are black and tarlike or have streaks of blood.    Watch closely for changes in your health, and be sure to contact your doctor if:    · Your symptoms have not improved after 2 days.     · Food seems to catch in your throat or chest.   Where can you learn more? Go to http://keon-gray.info/.   Enter O479 in the search box to learn more about \"Gastroesophageal Reflux Disease (GERD): Care Instructions. \"  Current as of: March 27, 2018  Content Version: 11.9  © 0223-3800 [employer]. Care instructions adapted under license by [employer] (which disclaims liability or warranty for this information). If you have questions about a medical condition or this instruction, always ask your healthcare professional. [address] any warranty or liability for your use of this information. Patient Education        Alcohol and Drug Problems: Care Instructions  Your Care Instructions    You can improve your life and health by stopping your use of alcohol or drugs. Ending dependency on alcohol or drugs may help you feel and sleep better. You may get along better with your family, friends, and coworkers. There are medicines and programs that can help. Follow-up care is a key part of your treatment and safety. Be sure to make and go to all appointments, and call your doctor if you are having problems. It's also a good idea to know your test results and keep a list of the medicines you take. How can you care for yourself at home? · If you have been given medicine to help keep you sober or reduce your cravings, be sure to take it as prescribed. · Talk to your doctor about programs that can help you stop using drugs or drinking alcohol. · If your doctor prescribes disulfiram (Antabuse), do not drink any alcohol while you are taking this medicine. You may have severe, even life-threatening, side effects from even small amounts of alcohol. · Do not tempt yourself by keeping alcohol or drugs in your home. · Learn how to say no when other people drink or use drugs. Or don't spend time with people who drink or use drugs. · Use the time and money spent on drinking or drugs to do something fun with your family or friends. Preventing a relapse  · Do not drink alcohol or use drugs at all.  Using any amount of alcohol or drugs greatly increases your risk for relapse. · Seek help from organizations such as Alcoholics Anonymous, Narcotics Anonymous, or treatment facilities if you feel the need to drink alcohol or use drugs again. · Remember that recovery is a lifelong process. · Stay away from situations, friends, or places that may lead you to drink or use drugs. · Have a plan to spot and deal with relapse. Learn to recognize changes in your thinking that lead you to drink or use drugs. These are warning signs. Get help before you start to drink or use drugs again. · Get help as soon as you can if you relapse. Some people make a plan with another person that outlines what they want that person to do for them if they relapse. The plan usually includes how to handle the relapse and who to notify in case of relapse. · Don't give up. Remember that a relapse does not mean that you have failed. Use the experience to learn the triggers that lead you to drink or use drugs. Then quit again. Many people have several relapses before they are able to quit for good. When should you call for help? Call 911 anytime you think you may need emergency care. For example, call if:    · You feel you cannot stop from hurting yourself or someone else.   [facility] your doctor now or seek immediate medical care if:    · You have serious withdrawal symptoms such as confusion, hallucinations, or severe trembling.    Watch closely for changes in your health, and be sure to contact your doctor if:    · You have a relapse.     · You need more help or support to stop. Where can you learn more? Go to http://keon-gray.info/. Enter [id number] in the search box to learn more about \"Alcohol and Drug Problems: Care Instructions. \"  Current as of: May 7, 2018  Content Version: 11.9  © 7343-1736 [employer].  Care instructions adapted under license by [employer] (which disclaims liability or warranty for this information). If you have questions about a medical condition or this instruction, always ask your healthcare professional. [place] [address] any warranty or liability for your use of this information.

## 2019-03-06 NOTE — ED NOTES
I have reviewed discharge instructions with the patient family member. The patient family member verbalized understanding. Patient left ED via Discharge Method: ambulatory to Home with Home Health with family member  Opportunity for questions and clarification provided. Patient given 1 scripts. To continue your aftercare when you leave the hospital, you may receive an automated call from our care team to check in on how you are doing. This is a free service and part of our promise to provide the best care and service to meet your aftercare needs.  If you have questions, or wish to unsubscribe from this service please call [phone]. Thank you for Choosing our [facility] [employer] [facility] Emergency Department.

## 2019-03-07 PROCEDURE — 3331090002 HH PPS REVENUE DEBIT

## 2019-03-07 PROCEDURE — 3331090001 HH PPS REVENUE CREDIT

## 2019-03-08 ENCOUNTER — HOME CARE VISIT (OUTPATIENT)
Dept: SCHEDULING | Facility: HOME HEALTH | Age: 69
End: 2019-03-08
Payer: COMMERCIAL

## 2019-03-08 PROCEDURE — 3331090002 HH PPS REVENUE DEBIT

## 2019-03-08 PROCEDURE — G0299 HHS/HOSPICE OF RN EA 15 MIN: HCPCS

## 2019-03-08 PROCEDURE — 3331090001 HH PPS REVENUE CREDIT

## 2019-03-09 PROCEDURE — 3331090001 HH PPS REVENUE CREDIT

## 2019-03-09 PROCEDURE — 3331090002 HH PPS REVENUE DEBIT

## 2019-03-10 PROCEDURE — 3331090002 HH PPS REVENUE DEBIT

## 2019-03-10 PROCEDURE — 3331090001 HH PPS REVENUE CREDIT

## 2019-03-11 PROCEDURE — 3331090002 HH PPS REVENUE DEBIT

## 2019-03-11 PROCEDURE — 3331090001 HH PPS REVENUE CREDIT

## 2019-03-12 PROCEDURE — 3331090001 HH PPS REVENUE CREDIT

## 2019-03-12 PROCEDURE — 3331090002 HH PPS REVENUE DEBIT

## 2019-03-13 ENCOUNTER — APPOINTMENT (OUTPATIENT)
Dept: GENERAL RADIOLOGY | Age: 69
End: 2019-03-13
Attending: EMERGENCY MEDICINE
Payer: COMMERCIAL

## 2019-03-13 ENCOUNTER — HOSPITAL ENCOUNTER (EMERGENCY)
Age: 69
Discharge: HOME | End: 2019-03-13
Attending: EMERGENCY MEDICINE
Payer: COMMERCIAL

## 2019-03-13 VITALS
TEMPERATURE: 99 F | OXYGEN SATURATION: 98 % | SYSTOLIC BLOOD PRESSURE: 158 MMHG | HEART RATE: 69 BPM | RESPIRATION RATE: 18 BRPM | DIASTOLIC BLOOD PRESSURE: 88 MMHG

## 2019-03-13 VITALS
TEMPERATURE: 98 F | WEIGHT: 175 LBS | OXYGEN SATURATION: 96 % | HEIGHT: 66 IN | SYSTOLIC BLOOD PRESSURE: 116 MMHG | DIASTOLIC BLOOD PRESSURE: 85 MMHG | RESPIRATION RATE: 20 BRPM | BODY MASS INDEX: 28.12 KG/M2 | HEART RATE: 74 BPM

## 2019-03-13 DIAGNOSIS — R07.9 CHEST PAIN, UNSPECIFIED TYPE: ICD-10-CM

## 2019-03-13 DIAGNOSIS — F10.920 ALCOHOLIC INTOXICATION WITHOUT COMPLICATION (HCC): Primary | ICD-10-CM

## 2019-03-13 LAB
ALBUMIN SERPL-MCNC: 3.5 G/DL (ref 3.2–4.6)
ALBUMIN/GLOB SERPL: 1.1 {RATIO} (ref 1.2–3.5)
ALP SERPL-CCNC: 95 U/L (ref 50–136)
ALT SERPL-CCNC: 17 U/L (ref 12–65)
ANION GAP SERPL CALC-SCNC: 12 MMOL/L (ref 7–16)
AST SERPL-CCNC: 20 U/L (ref 15–37)
ATRIAL RATE: 81 BPM
BASOPHILS # BLD: 0.1 K/UL (ref 0–0.2)
BASOPHILS NFR BLD: 1 % (ref 0–2)
BILIRUB SERPL-MCNC: 0.6 MG/DL (ref 0.2–1.1)
BUN SERPL-MCNC: 9 MG/DL (ref 8–23)
CALCIUM SERPL-MCNC: 8 MG/DL (ref 8.3–10.4)
CALCULATED P AXIS, ECG09: 58 DEGREES
CALCULATED R AXIS, ECG10: 19 DEGREES
CALCULATED T AXIS, ECG11: -44 DEGREES
CHLORIDE SERPL-SCNC: 111 MMOL/L (ref 98–107)
CO2 SERPL-SCNC: 21 MMOL/L (ref 21–32)
CREAT SERPL-MCNC: 1.24 MG/DL (ref 0.8–1.5)
DIAGNOSIS, 93000: NORMAL
DIFFERENTIAL METHOD BLD: ABNORMAL
EOSINOPHIL # BLD: 0.1 K/UL (ref 0–0.8)
EOSINOPHIL NFR BLD: 1 % (ref 0.5–7.8)
ERYTHROCYTE [DISTWIDTH] IN BLOOD BY AUTOMATED COUNT: 14.4 % (ref 11.9–14.6)
ETHANOL SERPL-MCNC: 291 MG/DL
GLOBULIN SER CALC-MCNC: 3.3 G/DL (ref 2.3–3.5)
GLUCOSE BLD STRIP.AUTO-MCNC: 125 MG/DL (ref 65–100)
GLUCOSE SERPL-MCNC: 108 MG/DL (ref 65–100)
HCT VFR BLD AUTO: 48.3 % (ref 41.1–50.3)
HGB BLD-MCNC: 16.7 G/DL (ref 13.6–17.2)
IMM GRANULOCYTES # BLD AUTO: 0 K/UL (ref 0–0.5)
IMM GRANULOCYTES NFR BLD AUTO: 1 % (ref 0–5)
LYMPHOCYTES # BLD: 1.6 K/UL (ref 0.5–4.6)
LYMPHOCYTES NFR BLD: 39 % (ref 13–44)
MCH RBC QN AUTO: 32.4 PG (ref 26.1–32.9)
MCHC RBC AUTO-ENTMCNC: 34.6 G/DL (ref 31.4–35)
MCV RBC AUTO: 93.8 FL (ref 79.6–97.8)
MONOCYTES # BLD: 0.3 K/UL (ref 0.1–1.3)
MONOCYTES NFR BLD: 6 % (ref 4–12)
NEUTS SEG # BLD: 2.1 K/UL (ref 1.7–8.2)
NEUTS SEG NFR BLD: 52 % (ref 43–78)
NRBC # BLD: 0 K/UL (ref 0–0.2)
P-R INTERVAL, ECG05: 174 MS
PLATELET # BLD AUTO: 175 K/UL (ref 150–450)
PMV BLD AUTO: 10.5 FL (ref 9.4–12.3)
POTASSIUM SERPL-SCNC: 3.4 MMOL/L (ref 3.5–5.1)
PROT SERPL-MCNC: 6.8 G/DL (ref 6.3–8.2)
Q-T INTERVAL, ECG07: 348 MS
QRS DURATION, ECG06: 68 MS
QTC CALCULATION (BEZET), ECG08: 404 MS
RBC # BLD AUTO: 5.15 M/UL (ref 4.23–5.6)
SODIUM SERPL-SCNC: 144 MMOL/L (ref 136–145)
TROPONIN I BLD-MCNC: 0 NG/ML (ref 0.02–0.05)
TROPONIN I BLD-MCNC: 0.01 NG/ML (ref 0.02–0.05)
VENTRICULAR RATE, ECG03: 81 BPM
WBC # BLD AUTO: 4.1 K/UL (ref 4.3–11.1)

## 2019-03-13 PROCEDURE — 82962 GLUCOSE BLOOD TEST: CPT

## 2019-03-13 PROCEDURE — 80307 DRUG TEST PRSMV CHEM ANLYZR: CPT

## 2019-03-13 PROCEDURE — 93005 ELECTROCARDIOGRAM TRACING: CPT | Performed by: EMERGENCY MEDICINE

## 2019-03-13 PROCEDURE — 74011250637 HC RX REV CODE- 250/637: Performed by: EMERGENCY MEDICINE

## 2019-03-13 PROCEDURE — 80053 COMPREHEN METABOLIC PANEL: CPT

## 2019-03-13 PROCEDURE — 3331090002 HH PPS REVENUE DEBIT

## 2019-03-13 PROCEDURE — 3331090001 HH PPS REVENUE CREDIT

## 2019-03-13 PROCEDURE — 99285 EMERGENCY DEPT VISIT HI MDM: CPT | Performed by: EMERGENCY MEDICINE

## 2019-03-13 PROCEDURE — 71046 X-RAY EXAM CHEST 2 VIEWS: CPT

## 2019-03-13 PROCEDURE — 85025 COMPLETE CBC W/AUTO DIFF WBC: CPT

## 2019-03-13 PROCEDURE — 84484 ASSAY OF TROPONIN QUANT: CPT

## 2019-03-13 RX ORDER — GUAIFENESIN 100 MG/5ML
324 LIQUID (ML) ORAL
Status: COMPLETED | OUTPATIENT
Start: 2019-03-13 | End: 2019-03-13

## 2019-03-13 RX ADMIN — ASPIRIN 81 MG 324 MG: 81 TABLET ORAL at 11:47

## 2019-03-13 NOTE — ED NOTES
I have reviewed discharge instructions with the patient. The patient verbalized understanding. Patient left ED via Discharge Method: ambulatory to Home with (yellow cab). Opportunity for questions and clarification provided. Patient given 0 scripts. To continue your aftercare when you leave the hospital, you may receive an automated call from our care team to check in on how you are doing. This is a free service and part of our promise to provide the best care and service to meet your aftercare needs.  If you have questions, or wish to unsubscribe from this service please call [phone]. Thank you for Choosing our [facility] Emergency Department.

## 2019-03-13 NOTE — ED NOTES
Pt noted to have been taking home meds in hallway. Upon questioning as to what taking, pt shows bottle of tramadol. Instructed not to take further meds unless provided in ed. Dr Janeen Cutler at bedside.

## 2019-03-13 NOTE — DISCHARGE INSTRUCTIONS

## 2019-03-13 NOTE — ED PROVIDER NOTES
Presents with complaint of chest pain. EMS reported that he called for falling. Patient is intoxicated. His story changes from provider to her right ear. He tells me he had chest pain Only when working quantify what it felt like. He took some tramadol in the hallway. The history is provided by the patient. Alcohol intoxication   Primary symptoms include: intoxication. This is a new problem. Suspected agents include alcohol. Pertinent negatives include no fever. Associated medical issues include psychiatric history. Past Medical History:   Diagnosis Date    Anxiety     Dermatophytosis of nail     Diabetes ([facility] [address] 9/12/2014    Diabetic polyneuropathy ([facility] [address]     Heart murmur     AORTIC SYSTOLIC FLOW MURMUR, [address]    Hepatic encephalopathy ([facility] [address] 5/5/2014    Hyperlipidemia     Hypertension     Metatarsal stress fracture     Metatarsalgia     Osteoarthritis     Peripheral edema     Psychiatric disorder     depression    UTI (urinary tract infection)        Past Surgical History:   Procedure Laterality Date    HX HERNIA REPAIR  2013    HX LAPAROTOMY  2012    ABDOMINAL SURGERY     HX OTHER SURGICAL      cyst removed from back x 1 week    NEUROLOGICAL PROCEDURE UNLISTED      plate in  head. .         Family History:   Problem Relation Age of Onset    Colon Cancer Mother     Stroke Father     Hypertension Sister        Social History     Socioeconomic History    Marital status:      Spouse name: Not on file    Number of children: Not on file    Years of education: Not on file    Highest education level: Not on file   Social Needs    Financial resource strain: Not on file    Food insecurity - worry: Not on file    Food insecurity - inability: Not on file   [employer] needs - medical: Not on file   [employer] needs - non-medical: Not on file   Occupational History    Not on file   Tobacco Use    Smoking status: Current Some Day Smoker    Smokeless tobacco: Never Used    Tobacco comment: LIGHT TOBACCO SMOKER 1-2 A DAY    Substance and Sexual Activity    Alcohol use: No     Alcohol/week: 0.0 oz     Frequency: Never    Drug use: No    Sexual activity: Not Currently   Other Topics Concern    Not on file   Social History Narrative    Not on file         ALLERGIES: Pcn [penicillins]    Review of Systems   Constitutional: Negative for chills and fever. All other systems reviewed and are negative. Vitals:    03/13/19 1039 03/13/19 1150   BP: 134/88 148/89   Pulse: 95 80   Resp: 18 20   Temp: 98 °F (36.7 °C)    SpO2: 96% 93%   Weight: 79.4 kg (175 lb)    Height: 5' 6\" (1.676 m)             Physical Exam   Constitutional: He appears well-developed and well-nourished. No distress. HENT:   Head: Normocephalic and atraumatic. Eyes: Conjunctivae are normal. Right eye exhibits no discharge. Left eye exhibits no discharge. Neck: Normal range of motion. Neck supple. Cardiovascular: Normal rate and regular rhythm. Pulmonary/Chest: Effort normal and breath sounds normal. No respiratory distress. Abdominal: Soft. He exhibits no distension. There is no tenderness. Musculoskeletal: Normal range of motion. He exhibits no edema. Neurological: He is alert. He is disoriented. No cranial nerve deficit. Skin: Skin is warm and dry. Capillary refill takes less than 2 seconds. He is not diaphoretic. Psychiatric: He has a normal mood and affect. His behavior is normal.   Nursing note and vitals reviewed. MDM  Number of Diagnoses or Management Options  Diagnosis management comments: Pt had NSTEMI in feb. Will check labs. discharge after 2 sets and sober.          Amount and/or Complexity of Data Reviewed  Clinical lab tests: ordered and reviewed  Review and summarize past medical records: yes  Discuss the patient with other providers: yes  Independent visualization of images, tracings, or specimens: yes (Unchanged from previous, nsr no ST elevation)    Risk of Complications, Morbidity, and/or Mortality  Presenting problems: high  Diagnostic procedures: moderate  Management options: moderate    Patient Progress  Patient progress: improved         Procedures

## 2019-03-13 NOTE — ED TRIAGE NOTES
Pt brought in via EMS from home. Increased falls at home. ETOH noted by EMS.  Patient reports \"drinking beer all day\" yesterday

## 2019-03-14 PROCEDURE — 3331090002 HH PPS REVENUE DEBIT

## 2019-03-14 PROCEDURE — 3331090001 HH PPS REVENUE CREDIT

## 2019-03-15 ENCOUNTER — HOME CARE VISIT (OUTPATIENT)
Dept: HOME HEALTH SERVICES | Facility: HOME HEALTH | Age: 69
End: 2019-03-15
Payer: COMMERCIAL

## 2019-03-15 PROCEDURE — 3331090001 HH PPS REVENUE CREDIT

## 2019-03-15 PROCEDURE — 3331090002 HH PPS REVENUE DEBIT

## 2019-03-16 PROCEDURE — 3331090001 HH PPS REVENUE CREDIT

## 2019-03-16 PROCEDURE — 3331090002 HH PPS REVENUE DEBIT

## 2019-03-17 PROCEDURE — 3331090002 HH PPS REVENUE DEBIT

## 2019-03-17 PROCEDURE — 3331090001 HH PPS REVENUE CREDIT

## 2019-03-18 PROCEDURE — 3331090001 HH PPS REVENUE CREDIT

## 2019-03-18 PROCEDURE — 3331090002 HH PPS REVENUE DEBIT

## 2019-03-19 PROCEDURE — 3331090001 HH PPS REVENUE CREDIT

## 2019-03-19 PROCEDURE — 3331090002 HH PPS REVENUE DEBIT

## 2019-03-20 PROCEDURE — 3331090001 HH PPS REVENUE CREDIT

## 2019-03-20 PROCEDURE — 3331090002 HH PPS REVENUE DEBIT

## 2019-03-21 PROCEDURE — 3331090002 HH PPS REVENUE DEBIT

## 2019-03-21 PROCEDURE — 3331090001 HH PPS REVENUE CREDIT

## 2019-03-22 PROCEDURE — 3331090001 HH PPS REVENUE CREDIT

## 2019-03-22 PROCEDURE — 3331090002 HH PPS REVENUE DEBIT

## 2019-03-23 PROCEDURE — 3331090001 HH PPS REVENUE CREDIT

## 2019-03-23 PROCEDURE — 3331090002 HH PPS REVENUE DEBIT

## 2019-03-24 PROCEDURE — 3331090001 HH PPS REVENUE CREDIT

## 2019-03-24 PROCEDURE — 3331090002 HH PPS REVENUE DEBIT

## 2019-03-25 PROCEDURE — 3331090001 HH PPS REVENUE CREDIT

## 2019-03-25 PROCEDURE — 3331090002 HH PPS REVENUE DEBIT

## 2019-03-26 PROCEDURE — 3331090002 HH PPS REVENUE DEBIT

## 2019-03-26 PROCEDURE — 3331090001 HH PPS REVENUE CREDIT

## 2019-03-27 PROCEDURE — 3331090001 HH PPS REVENUE CREDIT

## 2019-03-27 PROCEDURE — 3331090002 HH PPS REVENUE DEBIT

## 2019-03-28 PROCEDURE — 3331090002 HH PPS REVENUE DEBIT

## 2019-03-28 PROCEDURE — 3331090001 HH PPS REVENUE CREDIT

## 2019-03-29 PROCEDURE — 3331090002 HH PPS REVENUE DEBIT

## 2019-03-29 PROCEDURE — 3331090001 HH PPS REVENUE CREDIT

## 2019-03-30 PROCEDURE — 3331090001 HH PPS REVENUE CREDIT

## 2019-03-30 PROCEDURE — 3331090002 HH PPS REVENUE DEBIT

## 2019-03-31 PROCEDURE — 3331090002 HH PPS REVENUE DEBIT

## 2019-03-31 PROCEDURE — 3331090001 HH PPS REVENUE CREDIT

## 2019-04-01 PROCEDURE — 3331090002 HH PPS REVENUE DEBIT

## 2019-04-01 PROCEDURE — 3331090001 HH PPS REVENUE CREDIT

## 2019-04-02 PROCEDURE — 3331090001 HH PPS REVENUE CREDIT

## 2019-04-02 PROCEDURE — 3331090002 HH PPS REVENUE DEBIT

## 2019-04-03 PROCEDURE — 3331090002 HH PPS REVENUE DEBIT

## 2019-04-03 PROCEDURE — 3331090001 HH PPS REVENUE CREDIT

## 2019-04-04 PROCEDURE — 3331090002 HH PPS REVENUE DEBIT

## 2019-04-04 PROCEDURE — 3331090001 HH PPS REVENUE CREDIT

## 2019-04-05 PROCEDURE — 3331090001 HH PPS REVENUE CREDIT

## 2019-04-05 PROCEDURE — 3331090002 HH PPS REVENUE DEBIT

## 2019-04-06 PROCEDURE — 3331090002 HH PPS REVENUE DEBIT

## 2019-04-06 PROCEDURE — 3331090001 HH PPS REVENUE CREDIT

## 2019-04-07 PROCEDURE — 3331090002 HH PPS REVENUE DEBIT

## 2019-04-07 PROCEDURE — 3331090001 HH PPS REVENUE CREDIT

## 2019-04-08 PROCEDURE — 3331090001 HH PPS REVENUE CREDIT

## 2019-04-08 PROCEDURE — 3331090002 HH PPS REVENUE DEBIT

## 2019-04-09 PROCEDURE — 3331090001 HH PPS REVENUE CREDIT

## 2019-04-09 PROCEDURE — 3331090002 HH PPS REVENUE DEBIT

## 2019-04-10 PROCEDURE — 3331090001 HH PPS REVENUE CREDIT

## 2019-04-10 PROCEDURE — 3331090002 HH PPS REVENUE DEBIT

## 2019-04-11 PROCEDURE — 3331090001 HH PPS REVENUE CREDIT

## 2019-04-11 PROCEDURE — 3331090002 HH PPS REVENUE DEBIT

## 2019-04-12 PROCEDURE — 3331090002 HH PPS REVENUE DEBIT

## 2019-04-12 PROCEDURE — 3331090001 HH PPS REVENUE CREDIT

## 2019-04-13 PROCEDURE — 3331090002 HH PPS REVENUE DEBIT

## 2019-04-13 PROCEDURE — 3331090001 HH PPS REVENUE CREDIT

## 2019-04-14 PROCEDURE — 3331090001 HH PPS REVENUE CREDIT

## 2019-04-14 PROCEDURE — 3331090002 HH PPS REVENUE DEBIT

## 2019-04-15 ENCOUNTER — HOME CARE VISIT (OUTPATIENT)
Dept: HOME HEALTH SERVICES | Facility: HOME HEALTH | Age: 69
End: 2019-04-15
Payer: COMMERCIAL

## 2019-04-15 PROCEDURE — 3331090001 HH PPS REVENUE CREDIT

## 2019-04-15 PROCEDURE — 3331090003 HH PPS REVENUE ADJ

## 2019-04-15 PROCEDURE — 3331090002 HH PPS REVENUE DEBIT

## 2019-05-23 PROBLEM — I21.4 NSTEMI (NON-ST ELEVATED MYOCARDIAL INFARCTION) (HCC): Status: RESOLVED | Noted: 2019-02-11 | Resolved: 2019-05-23

## 2019-05-23 PROBLEM — R07.9 CHEST PAIN: Status: RESOLVED | Noted: 2019-02-11 | Resolved: 2019-05-23

## 2019-09-05 ENCOUNTER — HOSPITAL ENCOUNTER (EMERGENCY)
Age: 69
Discharge: HOME | End: 2019-09-05
Payer: MEDICARE

## 2019-09-05 VITALS
OXYGEN SATURATION: 94 % | RESPIRATION RATE: 18 BRPM | SYSTOLIC BLOOD PRESSURE: 142 MMHG | HEIGHT: 66 IN | TEMPERATURE: 98.7 F | DIASTOLIC BLOOD PRESSURE: 83 MMHG | WEIGHT: 175 LBS | HEART RATE: 80 BPM | BODY MASS INDEX: 28.12 KG/M2

## 2019-09-05 DIAGNOSIS — F10.920 ALCOHOLIC INTOXICATION WITHOUT COMPLICATION (HCC): Primary | ICD-10-CM

## 2019-09-05 LAB
ALBUMIN SERPL-MCNC: 4.1 G/DL (ref 3.2–4.6)
ALBUMIN/GLOB SERPL: 1.1 {RATIO} (ref 1.2–3.5)
ALP SERPL-CCNC: 110 U/L (ref 50–136)
ALT SERPL-CCNC: 21 U/L (ref 12–65)
ANION GAP SERPL CALC-SCNC: 11 MMOL/L (ref 7–16)
AST SERPL-CCNC: 29 U/L (ref 15–37)
BASOPHILS # BLD: 0 K/UL (ref 0–0.2)
BASOPHILS NFR BLD: 1 % (ref 0–2)
BILIRUB SERPL-MCNC: 1.2 MG/DL (ref 0.2–1.1)
BUN SERPL-MCNC: 18 MG/DL (ref 8–23)
CALCIUM SERPL-MCNC: 8.9 MG/DL (ref 8.3–10.4)
CHLORIDE SERPL-SCNC: 108 MMOL/L (ref 98–107)
CO2 SERPL-SCNC: 23 MMOL/L (ref 21–32)
CREAT SERPL-MCNC: 1.89 MG/DL (ref 0.8–1.5)
DIFFERENTIAL METHOD BLD: NORMAL
EOSINOPHIL # BLD: 0.1 K/UL (ref 0–0.8)
EOSINOPHIL NFR BLD: 2 % (ref 0.5–7.8)
ERYTHROCYTE [DISTWIDTH] IN BLOOD BY AUTOMATED COUNT: 13.3 % (ref 11.9–14.6)
ETHANOL SERPL-MCNC: 321 MG/DL
GLOBULIN SER CALC-MCNC: 3.7 G/DL (ref 2.3–3.5)
GLUCOSE SERPL-MCNC: 107 MG/DL (ref 65–100)
HCT VFR BLD AUTO: 48.7 % (ref 41.1–50.3)
HGB BLD-MCNC: 16.9 G/DL (ref 13.6–17.2)
IMM GRANULOCYTES # BLD AUTO: 0 K/UL (ref 0–0.5)
IMM GRANULOCYTES NFR BLD AUTO: 0 % (ref 0–5)
LIPASE SERPL-CCNC: 236 U/L (ref 73–393)
LYMPHOCYTES # BLD: 2.6 K/UL (ref 0.5–4.6)
LYMPHOCYTES NFR BLD: 41 % (ref 13–44)
MCH RBC QN AUTO: 32.5 PG (ref 26.1–32.9)
MCHC RBC AUTO-ENTMCNC: 34.7 G/DL (ref 31.4–35)
MCV RBC AUTO: 93.7 FL (ref 79.6–97.8)
MONOCYTES # BLD: 0.4 K/UL (ref 0.1–1.3)
MONOCYTES NFR BLD: 6 % (ref 4–12)
NEUTS SEG # BLD: 3.1 K/UL (ref 1.7–8.2)
NEUTS SEG NFR BLD: 50 % (ref 43–78)
NRBC # BLD: 0 K/UL (ref 0–0.2)
PLATELET # BLD AUTO: 163 K/UL (ref 150–450)
PMV BLD AUTO: 10.3 FL (ref 9.4–12.3)
POTASSIUM SERPL-SCNC: 3.6 MMOL/L (ref 3.5–5.1)
PROT SERPL-MCNC: 7.8 G/DL (ref 6.3–8.2)
RBC # BLD AUTO: 5.2 M/UL (ref 4.23–5.6)
SODIUM SERPL-SCNC: 142 MMOL/L (ref 136–145)
WBC # BLD AUTO: 6.2 K/UL (ref 4.3–11.1)

## 2019-09-05 PROCEDURE — 83690 ASSAY OF LIPASE: CPT

## 2019-09-05 PROCEDURE — 80307 DRUG TEST PRSMV CHEM ANLYZR: CPT

## 2019-09-05 PROCEDURE — 85025 COMPLETE CBC W/AUTO DIFF WBC: CPT

## 2019-09-05 PROCEDURE — 80053 COMPREHEN METABOLIC PANEL: CPT

## 2019-09-05 PROCEDURE — 99284 EMERGENCY DEPT VISIT MOD MDM: CPT

## 2019-09-05 NOTE — ED TRIAGE NOTES
Pt arrives from [facility] via [facility] with c/o abdominal pain and etoh intoxication. Combative with EMS on arrival to ED, cooperative in triage.

## 2019-09-05 NOTE — DISCHARGE INSTRUCTIONS
Patient Education        Acute Alcohol Intoxication: Care Instructions  Your Care Instructions    You have had treatment to help your body rid itself of alcohol. Too much alcohol upsets the body's fluid balance. Your doctor may have given you fluids and vitamins. For some people, drinking too much alcohol is a one-time event. For others, it is an ongoing problem. In either case, it is serious. It can be life-threatening. Follow-up care is a key part of your treatment and safety. Be sure to make and go to all appointments, and call your doctor if you are having problems. It's also a good idea to know your test results and keep a list of the medicines you take. How can you care for yourself at home? · Do not drink and drive. · Be safe with medicines. Take your medicines exactly as prescribed. Call your doctor if you think you are having a problem with your medicine. · Your doctor may have prescribed disulfiram (Antabuse). Do not drink any alcohol while you are taking this medicine. You may have severe or even life-threatening side effects from even small amounts of alcohol. · If you were given medicine to prevent nausea, be sure to take it exactly as prescribed. · Before you take any medicine, tell your doctor if:  ? You have had a bad reaction to any medicines in the past.  ? You are taking other medicines, including over-the-counter ones, or have other health problems. ? You are or could be pregnant. · Be prepared to have some symptoms of withdrawal in the next few days. · Drink plenty of liquids in the next few days. · Seek help if you need it to stop drinking. Getting counseling and joining a support group can help you stay sober. Try a support group such as Alcoholics Anonymous. · Avoid alcohol when you take medicines. It can react with many medicines and cause serious problems. When should you call for help? Call 911 anytime you think you may need emergency care.  For example, call if:    · You feel confused and are seeing things that are not there.     · You are thinking about killing yourself or hurting others.     · You have a seizure.     · You vomit blood or what looks like coffee grounds.    Call your doctor now or seek immediate medical care if:    · You have trembling, restlessness, sweating, and other withdrawal symptoms that are new or that get worse.     · Your withdrawal symptoms come back after not bothering you for days or weeks.     · You can't stop vomiting.    Watch closely for changes in your health, and be sure to contact your doctor if:    · You need help to stop drinking. Where can you learn more? Go to http://keon-gray.info/. Enter T102 in the search box to learn more about \"Acute Alcohol Intoxication: Care Instructions. \"  Current as of: February 5, 2019  Content Version: 12.1  © 6593-4561 Healthwise, Incorporated. Care instructions adapted under license by [employer] (which disclaims liability or warranty for this information). If you have questions about a medical condition or this instruction, always ask your healthcare professional. [address] any warranty or liability for your use of this information.

## 2019-09-05 NOTE — ED NOTES
I have reviewed discharge instructions with the patient. The patient verbalized understanding. Patient left ED via Discharge Method: ambulatory to Home with self with wife driving him home. Opportunity for questions and clarification provided. Patient given 0 scripts. To continue your aftercare when you leave the hospital, you may receive an automated call from our care team to check in on how you are doing. This is a free service and part of our promise to provide the best care and service to meet your aftercare needs.  If you have questions, or wish to unsubscribe from this service please call [phone]. Thank you for Choosing our [facility] Emergency Department.

## 2019-09-05 NOTE — ED PROVIDER NOTES
67-year-old male presents the emergency department alcohol intoxication. Patient has a long history of alcoholism has been seen several times in this ER for alcohol intoxication. Patient has a variety of other medical issues including anxiety diabetes hyperlipidemia hypertension osteoarthritis. The history is provided by the patient. Alcohol intoxication   Primary symptoms include: somnolence, agitation and intoxication. This is a chronic problem. The current episode started more than 2 days ago. The problem has not changed since onset. Suspected agents include alcohol. Past Medical History:   Diagnosis Date    Anxiety     Dermatophytosis of nail     Diabetes ([facility] [address] 9/12/2014    Diabetic polyneuropathy ([facility] [address]     Heart murmur     AORTIC SYSTOLIC FLOW MURMUR, [address]    Hepatic encephalopathy ([facility] [address] 5/5/2014    Hyperlipidemia     Hypertension     Metatarsal stress fracture     Metatarsalgia     Osteoarthritis     Peripheral edema     Psychiatric disorder     depression    UTI (urinary tract infection)        Past Surgical History:   Procedure Laterality Date    HX HERNIA REPAIR  2013    HX LAPAROTOMY  2012    ABDOMINAL SURGERY     HX OTHER SURGICAL      cyst removed from back x 1 week    NEUROLOGICAL PROCEDURE UNLISTED      plate in  head. .         Family History:   Problem Relation Age of Onset    Colon Cancer Mother     Stroke Father     Hypertension Sister        Social History     Socioeconomic History    Marital status:      Spouse name: Not on file    Number of children: Not on file    Years of education: Not on file    Highest education level: Not on file   Occupational History    Not on file   Social Needs    Financial resource strain: Not on file    Food insecurity:     Worry: Not on file     Inability: Not on file    Transportation needs:     Medical: Not on file     Non-medical: Not on file   Tobacco Use    Smoking status: Current Some Day Smoker    Smokeless tobacco: Never Used    Tobacco comment: LIGHT TOBACCO SMOKER 1-2 A DAY    Substance and Sexual Activity    Alcohol use: No     Alcohol/week: 0.0 standard drinks     Frequency: Never    Drug use: No    Sexual activity: Not Currently   Lifestyle    Physical activity:     Days per week: Not on file     Minutes per session: Not on file    Stress: Not on file   Relationships    Social connections:     Talks on phone: Not on file     Gets together: Not on file     Attends Protestant service: Not on file     Active member of club or organization: Not on file     Attends meetings of clubs or organizations: Not on file     Relationship status: Not on file    Intimate partner violence:     Fear of current or ex partner: Not on file     Emotionally abused: Not on file     Physically abused: Not on file     Forced sexual activity: Not on file   Other Topics Concern    Not on file   Social History Narrative    Not on file         ALLERGIES: Pcn [penicillins]    Review of Systems   Constitutional: Negative. Negative for activity change. HENT: Negative. Eyes: Negative. Respiratory: Negative. Cardiovascular: Negative. Gastrointestinal: Negative. Genitourinary: Negative. Musculoskeletal: Negative. Skin: Negative. Neurological: Negative. Psychiatric/Behavioral: Positive for agitation. All other systems reviewed and are negative. Vitals:    09/05/19 0134 09/05/19 0151   BP: 145/88    Pulse: 85    Resp: 18    Temp: 98.5 °F (36.9 °C)    SpO2: 94% 94%   Weight: 79.4 kg (175 lb)    Height: 5' 6\" (1.676 m)             Physical Exam   Constitutional: He is oriented to person, place, and time. He appears well-developed and well-nourished. No distress. HENT:   Head: Normocephalic and atraumatic. Right Ear: External ear normal.   Left Ear: External ear normal.   Nose: Nose normal.   Mouth/Throat: Oropharynx is clear and moist. No oropharyngeal exudate.    Eyes: Pupils are equal, round, and reactive to light. Conjunctivae and EOM are normal. Right eye exhibits no discharge. Left eye exhibits no discharge. No scleral icterus. Neck: Normal range of motion. Neck supple. No JVD present. No tracheal deviation present. Cardiovascular: Normal rate, regular rhythm and intact distal pulses. Pulmonary/Chest: Effort normal and breath sounds normal. No stridor. No respiratory distress. He has no wheezes. He exhibits no tenderness. Abdominal: Soft. Bowel sounds are normal. He exhibits no distension and no mass. There is no tenderness. Musculoskeletal: Normal range of motion. He exhibits no edema or tenderness. Neurological: He is alert and oriented to person, place, and time. No cranial nerve deficit. Skin: Skin is warm and dry. No rash noted. He is not diaphoretic. No erythema. No pallor. Psychiatric: He has a normal mood and affect. His behavior is normal. Thought content normal.   Nursing note and vitals reviewed. MDM  Number of Diagnoses or Management Options  Alcoholic intoxication without complication [facility]):   Diagnosis management comments: Alcohol intoxication    Patient has a history of alcoholism and been drinking heavily for the last few days. Patient is encouraged to take his medications as prescribed and do not drink alcohol. He needs to contact [facility] to get into a detox program.  Patient stated he was not interested in that did not want to be in the ER and did not want to be examined or treated.        Amount and/or Complexity of Data Reviewed  Clinical lab tests: ordered and reviewed  Tests in the medicine section of CPT®: ordered and reviewed           Procedures

## 2021-03-02 ENCOUNTER — APPOINTMENT (OUTPATIENT)
Dept: CT IMAGING | Age: 71
End: 2021-03-02
Attending: STUDENT IN AN ORGANIZED HEALTH CARE EDUCATION/TRAINING PROGRAM
Payer: MEDICARE

## 2021-03-02 ENCOUNTER — HOSPITAL ENCOUNTER (EMERGENCY)
Age: 71
Discharge: HOME | End: 2021-03-02
Attending: STUDENT IN AN ORGANIZED HEALTH CARE EDUCATION/TRAINING PROGRAM
Payer: MEDICARE

## 2021-03-02 VITALS
DIASTOLIC BLOOD PRESSURE: 98 MMHG | HEART RATE: 98 BPM | BODY MASS INDEX: 28.12 KG/M2 | SYSTOLIC BLOOD PRESSURE: 155 MMHG | WEIGHT: 175 LBS | TEMPERATURE: 98.4 F | HEIGHT: 66 IN | OXYGEN SATURATION: 96 % | RESPIRATION RATE: 18 BRPM

## 2021-03-02 DIAGNOSIS — F10.920 ACUTE ALCOHOLIC INTOXICATION WITHOUT COMPLICATION (HCC): Primary | ICD-10-CM

## 2021-03-02 LAB
ALBUMIN SERPL-MCNC: 4.3 G/DL (ref 3.2–4.6)
ALBUMIN/GLOB SERPL: 1 {RATIO} (ref 1.2–3.5)
ALP SERPL-CCNC: 135 U/L (ref 50–136)
ALT SERPL-CCNC: 31 U/L (ref 12–65)
AMPHET UR QL SCN: NEGATIVE
ANION GAP SERPL CALC-SCNC: 9 MMOL/L (ref 7–16)
APPEARANCE UR: CLEAR
AST SERPL-CCNC: 31 U/L (ref 15–37)
ATRIAL RATE: 96 BPM
BACTERIA URNS QL MICRO: 0 /HPF
BARBITURATES UR QL SCN: NEGATIVE
BASOPHILS # BLD: 0 K/UL (ref 0–0.2)
BASOPHILS NFR BLD: 1 % (ref 0–2)
BENZODIAZ UR QL: NEGATIVE
BILIRUB SERPL-MCNC: 0.6 MG/DL (ref 0.2–1.1)
BILIRUB UR QL: NEGATIVE
BUN SERPL-MCNC: 10 MG/DL (ref 8–23)
CALCIUM SERPL-MCNC: 9.4 MG/DL (ref 8.3–10.4)
CALCULATED P AXIS, ECG09: 65 DEGREES
CALCULATED R AXIS, ECG10: 71 DEGREES
CALCULATED T AXIS, ECG11: 29 DEGREES
CANNABINOIDS UR QL SCN: NEGATIVE
CASTS URNS QL MICRO: ABNORMAL /LPF
CHLORIDE SERPL-SCNC: 104 MMOL/L (ref 98–107)
CO2 SERPL-SCNC: 26 MMOL/L (ref 21–32)
COCAINE UR QL SCN: NEGATIVE
COLOR UR: YELLOW
CREAT SERPL-MCNC: 1.39 MG/DL (ref 0.8–1.5)
DIAGNOSIS, 93000: NORMAL
DIFFERENTIAL METHOD BLD: ABNORMAL
EOSINOPHIL # BLD: 0 K/UL (ref 0–0.8)
EOSINOPHIL NFR BLD: 0 % (ref 0.5–7.8)
EPI CELLS #/AREA URNS HPF: 0 /HPF
ERYTHROCYTE [DISTWIDTH] IN BLOOD BY AUTOMATED COUNT: 13 % (ref 11.9–14.6)
ETHANOL SERPL-MCNC: 267 MG/DL
GLOBULIN SER CALC-MCNC: 4.2 G/DL (ref 2.3–3.5)
GLUCOSE SERPL-MCNC: 121 MG/DL (ref 65–100)
GLUCOSE UR STRIP.AUTO-MCNC: NEGATIVE MG/DL
HCT VFR BLD AUTO: 51.8 % (ref 41.1–50.3)
HGB BLD-MCNC: 18.4 G/DL (ref 13.6–17.2)
HGB UR QL STRIP: ABNORMAL
IMM GRANULOCYTES # BLD AUTO: 0 K/UL (ref 0–0.5)
IMM GRANULOCYTES NFR BLD AUTO: 1 % (ref 0–5)
KETONES UR QL STRIP.AUTO: NEGATIVE MG/DL
LEUKOCYTE ESTERASE UR QL STRIP.AUTO: NEGATIVE
LYMPHOCYTES # BLD: 1.2 K/UL (ref 0.5–4.6)
LYMPHOCYTES NFR BLD: 22 % (ref 13–44)
MCH RBC QN AUTO: 33 PG (ref 26.1–32.9)
MCHC RBC AUTO-ENTMCNC: 35.5 G/DL (ref 31.4–35)
MCV RBC AUTO: 92.8 FL (ref 79.6–97.8)
METHADONE UR QL: NEGATIVE
MONOCYTES # BLD: 0.3 K/UL (ref 0.1–1.3)
MONOCYTES NFR BLD: 5 % (ref 4–12)
NEUTS SEG # BLD: 4 K/UL (ref 1.7–8.2)
NEUTS SEG NFR BLD: 72 % (ref 43–78)
NITRITE UR QL STRIP.AUTO: NEGATIVE
NRBC # BLD: 0 K/UL (ref 0–0.2)
OPIATES UR QL: NEGATIVE
P-R INTERVAL, ECG05: 160 MS
PCP UR QL: NEGATIVE
PH UR STRIP: 5.5 [PH] (ref 5–9)
PLATELET # BLD AUTO: 145 K/UL (ref 150–450)
PMV BLD AUTO: 9.9 FL (ref 9.4–12.3)
POTASSIUM SERPL-SCNC: 3.5 MMOL/L (ref 3.5–5.1)
PROT SERPL-MCNC: 8.5 G/DL (ref 6.3–8.2)
PROT UR STRIP-MCNC: 100 MG/DL
Q-T INTERVAL, ECG07: 368 MS
QRS DURATION, ECG06: 128 MS
QTC CALCULATION (BEZET), ECG08: 467 MS
RBC # BLD AUTO: 5.58 M/UL (ref 4.23–5.6)
RBC #/AREA URNS HPF: ABNORMAL /HPF
SODIUM SERPL-SCNC: 139 MMOL/L (ref 138–145)
SP GR UR REFRACTOMETRY: 1.02 (ref 1–1.02)
UROBILINOGEN UR QL STRIP.AUTO: 1 EU/DL (ref 0.2–1)
VENTRICULAR RATE, ECG03: 97 BPM
WBC # BLD AUTO: 5.6 K/UL (ref 4.3–11.1)
WBC URNS QL MICRO: ABNORMAL /HPF

## 2021-03-02 PROCEDURE — 81001 URINALYSIS AUTO W/SCOPE: CPT

## 2021-03-02 PROCEDURE — 70450 CT HEAD/BRAIN W/O DYE: CPT

## 2021-03-02 PROCEDURE — 85025 COMPLETE CBC W/AUTO DIFF WBC: CPT

## 2021-03-02 PROCEDURE — 80053 COMPREHEN METABOLIC PANEL: CPT

## 2021-03-02 PROCEDURE — 82077 ASSAY SPEC XCP UR&BREATH IA: CPT

## 2021-03-02 PROCEDURE — 80307 DRUG TEST PRSMV CHEM ANLYZR: CPT

## 2021-03-02 PROCEDURE — 93005 ELECTROCARDIOGRAM TRACING: CPT | Performed by: STUDENT IN AN ORGANIZED HEALTH CARE EDUCATION/TRAINING PROGRAM

## 2021-03-02 PROCEDURE — 99284 EMERGENCY DEPT VISIT MOD MDM: CPT

## 2021-03-02 NOTE — DISCHARGE INSTRUCTIONS
Return with any vomiting, difficulty breathing, worsening symptoms, or additional concerns.    Follow up with [facility], for substance abuse help.  [facility] [place]   [phone]   They have multiple resources to help you.  Please call.  [email or web address]     Other local resources that are available are:       [facility]    [phone]  [facility] for inpatient and outpatient substance abuse issues.    [facility] [phone]  Medication assisted treatment    [facility]  [phone]     Suicide Hotline   3-034-FUPXYYT     Narcotics Anonymous   www.na.org  Alcoholics Anonymous  www.aa.org

## 2021-03-02 NOTE — ED PROVIDER NOTES
41-year-old male patient brought in by EMS after being found down in somebody's yard. It is unclear as to how patient ended up with this area. [facility] apartment found patient and called EMS providers. He reportedly has been drinking heavily and was lying in the grass. Patient denied any fall for EMS or [address] [place] [address]. department. He does report hitting his head on a screen door at some point. He states he drank \"a little bit\". Patient is visibly intoxicated, difficult to understand and a very poor historian. He states he lives alone. Patient reports no other complaint at this time. EMS reports no interventions necessary in route, stable vital signs in route. Past Medical History:   Diagnosis Date    Anxiety     Dermatophytosis of nail     Diabetes ([facility] [address] 9/12/2014    Diabetic polyneuropathy ([facility] [address]     Heart murmur     AORTIC SYSTOLIC FLOW MURMUR, [address]    Hepatic encephalopathy ([facility] [address] 5/5/2014    Hyperlipidemia     Hypertension     Metatarsal stress fracture     Metatarsalgia     Osteoarthritis     Peripheral edema     Psychiatric disorder     depression    UTI (urinary tract infection)        Past Surgical History:   Procedure Laterality Date    HX HERNIA REPAIR  2013    HX LAPAROTOMY  2012    ABDOMINAL SURGERY     HX OTHER SURGICAL      cyst removed from back x 1 week    NEUROLOGICAL PROCEDURE UNLISTED      plate in  head. .         Family History:   Problem Relation Age of Onset    Colon Cancer Mother     Stroke Father     Hypertension Sister        Social History     Socioeconomic History    Marital status:      Spouse name: Not on file    Number of children: Not on file    Years of education: Not on file    Highest education level: Not on file   Occupational History    Not on file   Social Needs    Financial resource strain: Not on file    Food insecurity     Worry: Not on file     Inability: Not on file    Transportation needs     Medical: Not on file Non-medical: Not on file   Tobacco Use    Smoking status: Current Some Day Smoker    Smokeless tobacco: Never Used    Tobacco comment: LIGHT TOBACCO SMOKER 1-2 A DAY    Substance and Sexual Activity    Alcohol use: No     Alcohol/week: 0.0 standard drinks     Frequency: Never    Drug use: No    Sexual activity: Not Currently   Lifestyle    Physical activity     Days per week: Not on file     Minutes per session: Not on file    Stress: Not on file   Relationships    Social connections     Talks on phone: Not on file     Gets together: Not on file     Attends Gnosticist service: Not on file     Active member of club or organization: Not on file     Attends meetings of clubs or organizations: Not on file     Relationship status: Not on file    Intimate partner violence     Fear of current or ex partner: Not on file     Emotionally abused: Not on file     Physically abused: Not on file     Forced sexual activity: Not on file   Other Topics Concern    Not on file   Social History Narrative    Not on file         ALLERGIES: Pcn [penicillins]    Review of Systems   Constitutional: Negative for chills, diaphoresis and fever. HENT: Negative for congestion, sneezing and sore throat. Eyes: Negative for visual disturbance. Respiratory: Negative for cough, chest tightness, shortness of breath and wheezing. Cardiovascular: Negative for chest pain and leg swelling. Gastrointestinal: Negative for abdominal pain, blood in stool, diarrhea, nausea and vomiting. Endocrine: Negative for polyuria. Genitourinary: Negative for difficulty urinating, dysuria, flank pain, hematuria and urgency. Musculoskeletal: Negative for back pain, myalgias, neck pain and neck stiffness. Skin: Negative for color change and rash. Neurological: Negative for dizziness, syncope, speech difficulty, weakness, light-headedness, numbness and headaches. Psychiatric/Behavioral: Negative for behavioral problems.    All other systems reviewed and are negative. Vitals:    03/02/21 0616   BP: (!) 155/98   Pulse: 99   Resp: 18   Temp: 98.4 °F (36.9 °C)   SpO2: 98%   Weight: 79.4 kg (175 lb)   Height: 5' 6\" (1.676 m)            Physical Exam  Vitals signs and nursing note reviewed. Constitutional:       General: He is not in acute distress. Appearance: He is well-developed. He is not diaphoretic. Comments: Somewhat disheveled appearing, obviously intoxicated male patient. Alert and oriented to person place and time. No acute distress, speaks in slurred near unintelligible responses   HENT:      Head: Normocephalic and atraumatic. Right Ear: External ear normal.      Left Ear: External ear normal.      Nose: Nose normal.   Eyes:      Pupils: Pupils are equal, round, and reactive to light. Neck:      Musculoskeletal: Normal range of motion. Cardiovascular:      Rate and Rhythm: Normal rate and regular rhythm. Heart sounds: Normal heart sounds. No murmur. No friction rub. No gallop. Pulmonary:      Effort: Pulmonary effort is normal. No respiratory distress. Breath sounds: Normal breath sounds. No stridor. No decreased breath sounds, wheezing, rhonchi or rales. Chest:      Chest wall: No tenderness. Abdominal:      General: There is no distension. Palpations: Abdomen is soft. There is no mass. Tenderness: There is no abdominal tenderness. There is no guarding or rebound. Hernia: No hernia is present. Musculoskeletal: Normal range of motion. General: No tenderness or deformity. Skin:     General: Skin is warm and dry. Neurological:      Mental Status: He is alert and oriented to person, place, and time. GCS: GCS eye subscore is 4. GCS verbal subscore is 5. GCS motor subscore is 6. Cranial Nerves: No cranial nerve deficit.           MDM  Number of Diagnoses or Management Options  Diagnosis management comments: I detect no evidence of trauma on exam.  Will obtain basic labs EKG and alcohol level. We will also obtain CT imaging of the brain. Amount and/or Complexity of Data Reviewed  Clinical lab tests: ordered and reviewed  Tests in the radiology section of CPT®: ordered and reviewed  Tests in the medicine section of CPT®: ordered and reviewed  Independent visualization of images, tracings, or specimens: yes    Risk of Complications, Morbidity, and/or Mortality  Presenting problems: moderate  Diagnostic procedures: low  Management options: moderate    Patient Progress  Patient progress: stable    ED Course as of Mar 02 0832   Tue Mar 02, 2021   0624 EKG obtained on arrival shows normal sinus rhythm with a rate of 97 beats a minute. No evidence of acute ischemic change. There is a normal axis present. Some underlying artifact is present as well. [BR]   [id number] Patient is alert and talking. He said that he thinks his wife will come pick him up.   We will try to contact her.    [AC]      ED Course User Index  [AC] Landon Mcclendon MD  [BR] Virginia Ortiz, DO       Procedures

## 2021-03-02 NOTE — ED TRIAGE NOTES
Pt BIB EMS after being found on ground by [facility]'s dept. Pt intitially confused for EMS, admits to ETOH. Unknown length of time on ground. Pt cooperative and following instructions during triage.

## 2021-03-02 NOTE — ED NOTES
Discharge done by [facility]. I have reviewed discharge instructions with the patient. The patient verbalized understanding. Patient left ED via Discharge Method: ambulatory to Home with self. Opportunity for questions and clarification provided. Patient given 0 scripts. Spoke with daughter who is unable to  pt. Lizbeth called on hospital acct. To continue your aftercare when you leave the hospital, you may receive an automated call from our care team to check in on how you are doing. This is a free service and part of our promise to provide the best care and service to meet your aftercare needs.  If you have questions, or wish to unsubscribe from this service please call [phone]. Thank you for Choosing our [facility] Emergency Department.

## 2021-03-03 ENCOUNTER — HOSPITAL ENCOUNTER (EMERGENCY)
Age: 71
Discharge: HOME | End: 2021-03-03
Attending: EMERGENCY MEDICINE
Payer: MEDICARE

## 2021-03-03 VITALS
OXYGEN SATURATION: 95 % | HEIGHT: 66 IN | SYSTOLIC BLOOD PRESSURE: 103 MMHG | WEIGHT: 165 LBS | TEMPERATURE: 97.5 F | DIASTOLIC BLOOD PRESSURE: 66 MMHG | HEART RATE: 105 BPM | RESPIRATION RATE: 14 BRPM | BODY MASS INDEX: 26.52 KG/M2

## 2021-03-03 DIAGNOSIS — F10.920 ALCOHOLIC INTOXICATION WITHOUT COMPLICATION (HCC): Primary | ICD-10-CM

## 2021-03-03 LAB — ETHANOL SERPL-MCNC: 227 MG/DL

## 2021-03-03 PROCEDURE — 82077 ASSAY SPEC XCP UR&BREATH IA: CPT

## 2021-03-03 PROCEDURE — 99284 EMERGENCY DEPT VISIT MOD MDM: CPT

## 2021-03-03 NOTE — ED NOTES
I have reviewed discharge instructions with the patient. The patient verbalized understanding. Patient left ED via Discharge Method: ambulatory to Home with self. States calling a ride . Opportunity for questions and clarification provided. Patient given 0 scripts. To continue your aftercare when you leave the hospital, you may receive an automated call from our care team to check in on how you are doing. This is a free service and part of our promise to provide the best care and service to meet your aftercare needs.  If you have questions, or wish to unsubscribe from this service please call [phone]. Thank you for Choosing our [employer] Emergency Department.

## 2021-03-03 NOTE — ED PROVIDER NOTES
67-year-old male presents via EMS after he was found in someone else's home using the bathroom. Patient laughingly tells me he has had a little bit to drink today, and tells me that the police brought him here after the person who was home he was and called the police. I have attempted to get further review of systems from him but this is impossible. He is visibly intoxicated. He is cooperative. He follows commands. Of note this patient was seen here yesterday with alcoholism. He had full sets of blood work drawn and was released yesterday. Past Medical History:   Diagnosis Date    Anxiety     Dermatophytosis of nail     Diabetes ([facility] [address] 9/12/2014    Diabetic polyneuropathy ([facility] [address]     Heart murmur     AORTIC SYSTOLIC FLOW MURMUR, [address]    Hepatic encephalopathy ([facility] [address] 5/5/2014    Hyperlipidemia     Hypertension     Metatarsal stress fracture     Metatarsalgia     Osteoarthritis     Peripheral edema     Psychiatric disorder     depression    UTI (urinary tract infection)        Past Surgical History:   Procedure Laterality Date    HX HERNIA REPAIR  2013    HX LAPAROTOMY  2012    ABDOMINAL SURGERY     HX OTHER SURGICAL      cyst removed from back x 1 week    NEUROLOGICAL PROCEDURE UNLISTED      plate in  head. .         Family History:   Problem Relation Age of Onset    Colon Cancer Mother     Stroke Father     Hypertension Sister        Social History     Socioeconomic History    Marital status:      Spouse name: Not on file    Number of children: Not on file    Years of education: Not on file    Highest education level: Not on file   Occupational History    Not on file   Social Needs    Financial resource strain: Not on file    Food insecurity     Worry: Not on file     Inability: Not on file    Transportation needs     Medical: Not on file     Non-medical: Not on file   Tobacco Use    Smoking status: Current Some Day Smoker    Smokeless tobacco: Never Used    Tobacco comment: LIGHT TOBACCO SMOKER 1-2 A DAY    Substance and Sexual Activity    Alcohol use: No     Alcohol/week: 0.0 standard drinks     Frequency: Never    Drug use: No    Sexual activity: Not Currently   Lifestyle    Physical activity     Days per week: Not on file     Minutes per session: Not on file    Stress: Not on file   Relationships    Social connections     Talks on phone: Not on file     Gets together: Not on file     Attends Yazidi service: Not on file     Active member of club or organization: Not on file     Attends meetings of clubs or organizations: Not on file     Relationship status: Not on file    Intimate partner violence     Fear of current or ex partner: Not on file     Emotionally abused: Not on file     Physically abused: Not on file     Forced sexual activity: Not on file   Other Topics Concern    Not on file   Social History Narrative    Not on file         ALLERGIES: Pcn [penicillins]    Review of Systems   Unable to perform ROS: Other       Vitals:    03/03/21 1353   BP: 103/66   Pulse: (!) 105   Resp: 14   Temp: 97.5 °F (36.4 °C)   SpO2: 95%   Weight: 74.8 kg (165 lb)   Height: 5' 6\" (1.676 m)            Physical Exam  Vitals signs and nursing note reviewed. Constitutional:       Comments: Visibly intoxicated   HENT:      Head: Normocephalic and atraumatic. Nose: Nose normal.      Mouth/Throat:      Mouth: Mucous membranes are moist.   Eyes:      General: Lids are normal.      Extraocular Movements: Extraocular movements intact. Conjunctiva/sclera:      Right eye: Right conjunctiva is injected. Left eye: Left conjunctiva is injected. Pupils: Pupils are equal, round, and reactive to light. Neck:      Musculoskeletal: Normal range of motion and neck supple. Cardiovascular:      Rate and Rhythm: Regular rhythm. Tachycardia present. Pulmonary:      Effort: Pulmonary effort is normal.      Breath sounds: Normal breath sounds. Musculoskeletal: Normal range of motion. Skin:     General: Skin is warm and dry. Neurological:      Mental Status: He is alert. Comments: Speech slurred and loud, follows commands. Purposeful mvt all extremities. MDM  Number of Diagnoses or Management Options  Diagnosis management comments: 78-year-old male presents via EMS after he was found in someone else's home using the bathroom. Patient laughingly tells me he has had a little bit to drink today, and tells me that the police brought him here after the person who was home he was and called the police. I have attempted to get further review of systems from him but this is impossible. He is visibly intoxicated. He is cooperative. He follows commands. Of note this patient was seen here yesterday with alcoholism. He had full sets of blood work drawn and was released yesterday. Will eval etoh, allow him to rest.  Discussed with dr Sekou Pena, no further diagnostics indicated at present    2:44 PM  ' pt is ambulatory, needs to go as his wife has to go to work.   He will be dc with a responsible adult       Amount and/or Complexity of Data Reviewed  Clinical lab tests: ordered    Risk of Complications, Morbidity, and/or Mortality  Presenting problems: minimal  Diagnostic procedures: minimal  Management options: minimal    Patient Progress  Patient progress: stable         Procedures

## 2021-03-10 ENCOUNTER — APPOINTMENT (OUTPATIENT)
Dept: CT IMAGING | Age: 71
End: 2021-03-10
Attending: STUDENT IN AN ORGANIZED HEALTH CARE EDUCATION/TRAINING PROGRAM
Payer: MEDICARE

## 2021-03-10 ENCOUNTER — HOSPITAL ENCOUNTER (EMERGENCY)
Age: 71
Discharge: HOME | End: 2021-03-11
Attending: STUDENT IN AN ORGANIZED HEALTH CARE EDUCATION/TRAINING PROGRAM
Payer: MEDICARE

## 2021-03-10 ENCOUNTER — APPOINTMENT (OUTPATIENT)
Dept: GENERAL RADIOLOGY | Age: 71
End: 2021-03-10
Attending: STUDENT IN AN ORGANIZED HEALTH CARE EDUCATION/TRAINING PROGRAM
Payer: MEDICARE

## 2021-03-10 DIAGNOSIS — F10.920 ALCOHOLIC INTOXICATION WITHOUT COMPLICATION (HCC): Primary | ICD-10-CM

## 2021-03-10 LAB
BASOPHILS # BLD: 0 K/UL (ref 0–0.2)
BASOPHILS NFR BLD: 1 % (ref 0–2)
DIFFERENTIAL METHOD BLD: ABNORMAL
EOSINOPHIL # BLD: 0.1 K/UL (ref 0–0.8)
EOSINOPHIL NFR BLD: 1 % (ref 0.5–7.8)
ERYTHROCYTE [DISTWIDTH] IN BLOOD BY AUTOMATED COUNT: 13.5 % (ref 11.9–14.6)
HCT VFR BLD AUTO: 50.7 % (ref 41.1–50.3)
HGB BLD-MCNC: 17.6 G/DL (ref 13.6–17.2)
IMM GRANULOCYTES # BLD AUTO: 0 K/UL (ref 0–0.5)
IMM GRANULOCYTES NFR BLD AUTO: 1 % (ref 0–5)
LYMPHOCYTES # BLD: 2.3 K/UL (ref 0.5–4.6)
LYMPHOCYTES NFR BLD: 42 % (ref 13–44)
MCH RBC QN AUTO: 33.1 PG (ref 26.1–32.9)
MCHC RBC AUTO-ENTMCNC: 34.7 G/DL (ref 31.4–35)
MCV RBC AUTO: 95.3 FL (ref 79.6–97.8)
MONOCYTES # BLD: 0.4 K/UL (ref 0.1–1.3)
MONOCYTES NFR BLD: 8 % (ref 4–12)
NEUTS SEG # BLD: 2.5 K/UL (ref 1.7–8.2)
NEUTS SEG NFR BLD: 47 % (ref 43–78)
NRBC # BLD: 0 K/UL (ref 0–0.2)
PLATELET # BLD AUTO: 156 K/UL (ref 150–450)
PMV BLD AUTO: 9.8 FL (ref 9.4–12.3)
RBC # BLD AUTO: 5.32 M/UL (ref 4.23–5.6)
WBC # BLD AUTO: 5.3 K/UL (ref 4.3–11.1)

## 2021-03-10 PROCEDURE — 99284 EMERGENCY DEPT VISIT MOD MDM: CPT

## 2021-03-10 PROCEDURE — 82077 ASSAY SPEC XCP UR&BREATH IA: CPT

## 2021-03-10 PROCEDURE — 83735 ASSAY OF MAGNESIUM: CPT

## 2021-03-10 PROCEDURE — 71045 X-RAY EXAM CHEST 1 VIEW: CPT

## 2021-03-10 PROCEDURE — 80053 COMPREHEN METABOLIC PANEL: CPT

## 2021-03-10 PROCEDURE — 85025 COMPLETE CBC W/AUTO DIFF WBC: CPT

## 2021-03-10 PROCEDURE — 82140 ASSAY OF AMMONIA: CPT

## 2021-03-11 ENCOUNTER — APPOINTMENT (OUTPATIENT)
Dept: CT IMAGING | Age: 71
End: 2021-03-11
Attending: STUDENT IN AN ORGANIZED HEALTH CARE EDUCATION/TRAINING PROGRAM
Payer: MEDICARE

## 2021-03-11 ENCOUNTER — HOSPITAL ENCOUNTER (EMERGENCY)
Age: 71
Discharge: HOME | End: 2021-03-12
Attending: EMERGENCY MEDICINE
Payer: MEDICARE

## 2021-03-11 VITALS
OXYGEN SATURATION: 98 % | SYSTOLIC BLOOD PRESSURE: 139 MMHG | BODY MASS INDEX: 27.49 KG/M2 | DIASTOLIC BLOOD PRESSURE: 95 MMHG | HEIGHT: 65 IN | RESPIRATION RATE: 18 BRPM | HEART RATE: 95 BPM | TEMPERATURE: 98.4 F | WEIGHT: 165 LBS

## 2021-03-11 VITALS
OXYGEN SATURATION: 95 % | SYSTOLIC BLOOD PRESSURE: 135 MMHG | TEMPERATURE: 98.5 F | DIASTOLIC BLOOD PRESSURE: 76 MMHG | RESPIRATION RATE: 18 BRPM | HEART RATE: 102 BPM

## 2021-03-11 DIAGNOSIS — F10.920 ACUTE ALCOHOLIC INTOXICATION WITHOUT COMPLICATION (HCC): Primary | ICD-10-CM

## 2021-03-11 LAB
ALBUMIN SERPL-MCNC: 3.9 G/DL (ref 3.2–4.6)
ALBUMIN SERPL-MCNC: 4.2 G/DL (ref 3.2–4.6)
ALBUMIN/GLOB SERPL: 1.1 {RATIO} (ref 1.2–3.5)
ALBUMIN/GLOB SERPL: 1.2 {RATIO} (ref 1.2–3.5)
ALP SERPL-CCNC: 124 U/L (ref 50–136)
ALP SERPL-CCNC: 135 U/L (ref 50–136)
ALT SERPL-CCNC: 36 U/L (ref 12–65)
ALT SERPL-CCNC: 39 U/L (ref 12–65)
AMMONIA PLAS-SCNC: 17 UMOL/L (ref 11–32)
AMPHET UR QL SCN: NEGATIVE
ANION GAP SERPL CALC-SCNC: 10 MMOL/L (ref 7–16)
ANION GAP SERPL CALC-SCNC: 8 MMOL/L (ref 7–16)
AST SERPL-CCNC: 30 U/L (ref 15–37)
AST SERPL-CCNC: 31 U/L (ref 15–37)
BARBITURATES UR QL SCN: NEGATIVE
BASOPHILS # BLD: 0 K/UL (ref 0–0.2)
BASOPHILS NFR BLD: 1 % (ref 0–2)
BENZODIAZ UR QL: NEGATIVE
BILIRUB SERPL-MCNC: 0.9 MG/DL (ref 0.2–1.1)
BILIRUB SERPL-MCNC: 1 MG/DL (ref 0.2–1.1)
BUN SERPL-MCNC: 15 MG/DL (ref 8–23)
BUN SERPL-MCNC: 16 MG/DL (ref 8–23)
CALCIUM SERPL-MCNC: 8.8 MG/DL (ref 8.3–10.4)
CALCIUM SERPL-MCNC: 8.9 MG/DL (ref 8.3–10.4)
CANNABINOIDS UR QL SCN: NEGATIVE
CHLORIDE SERPL-SCNC: 109 MMOL/L (ref 98–107)
CHLORIDE SERPL-SCNC: 111 MMOL/L (ref 98–107)
CO2 SERPL-SCNC: 21 MMOL/L (ref 21–32)
CO2 SERPL-SCNC: 25 MMOL/L (ref 21–32)
COCAINE UR QL SCN: NEGATIVE
CREAT SERPL-MCNC: 1.53 MG/DL (ref 0.8–1.5)
CREAT SERPL-MCNC: 1.76 MG/DL (ref 0.8–1.5)
DIFFERENTIAL METHOD BLD: ABNORMAL
EOSINOPHIL # BLD: 0 K/UL (ref 0–0.8)
EOSINOPHIL NFR BLD: 1 % (ref 0.5–7.8)
ERYTHROCYTE [DISTWIDTH] IN BLOOD BY AUTOMATED COUNT: 13.5 % (ref 11.9–14.6)
ETHANOL SERPL-MCNC: 188 MG/DL
ETHANOL SERPL-MCNC: 251 MG/DL
GLOBULIN SER CALC-MCNC: 3.6 G/DL (ref 2.3–3.5)
GLOBULIN SER CALC-MCNC: 3.6 G/DL (ref 2.3–3.5)
GLUCOSE SERPL-MCNC: 101 MG/DL (ref 65–100)
GLUCOSE SERPL-MCNC: 83 MG/DL (ref 65–100)
HCT VFR BLD AUTO: 47 % (ref 41.1–50.3)
HGB BLD-MCNC: 16.4 G/DL (ref 13.6–17.2)
IMM GRANULOCYTES # BLD AUTO: 0 K/UL (ref 0–0.5)
IMM GRANULOCYTES NFR BLD AUTO: 0 % (ref 0–5)
LYMPHOCYTES # BLD: 1.3 K/UL (ref 0.5–4.6)
LYMPHOCYTES NFR BLD: 29 % (ref 13–44)
MAGNESIUM SERPL-MCNC: 2.4 MG/DL (ref 1.8–2.4)
MCH RBC QN AUTO: 33.3 PG (ref 26.1–32.9)
MCHC RBC AUTO-ENTMCNC: 34.9 G/DL (ref 31.4–35)
MCV RBC AUTO: 95.3 FL (ref 79.6–97.8)
METHADONE UR QL: NEGATIVE
MONOCYTES # BLD: 0.5 K/UL (ref 0.1–1.3)
MONOCYTES NFR BLD: 10 % (ref 4–12)
NEUTS SEG # BLD: 2.8 K/UL (ref 1.7–8.2)
NEUTS SEG NFR BLD: 60 % (ref 43–78)
NRBC # BLD: 0 K/UL (ref 0–0.2)
OPIATES UR QL: NEGATIVE
PCP UR QL: NEGATIVE
PLATELET # BLD AUTO: 147 K/UL (ref 150–450)
PMV BLD AUTO: 9.9 FL (ref 9.4–12.3)
POTASSIUM SERPL-SCNC: 3.6 MMOL/L (ref 3.5–5.1)
POTASSIUM SERPL-SCNC: 3.7 MMOL/L (ref 3.5–5.1)
PROT SERPL-MCNC: 7.5 G/DL (ref 6.3–8.2)
PROT SERPL-MCNC: 7.8 G/DL (ref 6.3–8.2)
RBC # BLD AUTO: 4.93 M/UL (ref 4.23–5.6)
SODIUM SERPL-SCNC: 142 MMOL/L (ref 136–145)
SODIUM SERPL-SCNC: 142 MMOL/L (ref 136–145)
WBC # BLD AUTO: 4.6 K/UL (ref 4.3–11.1)

## 2021-03-11 PROCEDURE — 85025 COMPLETE CBC W/AUTO DIFF WBC: CPT

## 2021-03-11 PROCEDURE — 99283 EMERGENCY DEPT VISIT LOW MDM: CPT

## 2021-03-11 PROCEDURE — 82077 ASSAY SPEC XCP UR&BREATH IA: CPT

## 2021-03-11 PROCEDURE — 70450 CT HEAD/BRAIN W/O DYE: CPT

## 2021-03-11 PROCEDURE — 80307 DRUG TEST PRSMV CHEM ANLYZR: CPT

## 2021-03-11 PROCEDURE — 80053 COMPREHEN METABOLIC PANEL: CPT

## 2021-03-11 RX ORDER — SODIUM CHLORIDE, SODIUM LACTATE, POTASSIUM CHLORIDE, CALCIUM CHLORIDE 600; 310; 30; 20 MG/100ML; MG/100ML; MG/100ML; MG/100ML
150 INJECTION, SOLUTION INTRAVENOUS CONTINUOUS
Status: DISCONTINUED | OUTPATIENT
Start: 2021-03-11 | End: 2021-03-12 | Stop reason: HOSPADM

## 2021-03-11 NOTE — ED TRIAGE NOTES
Arrives with face mask in place. Arrives from home via [facility] with reports altered mental status. EMS reports hx dementia, lives alone. EMS reports found friend dead 1 year ago and believed to have found him on couch tonight. On arrival to triage pt reports high blood sugar, BGL 86 with EMS.  Pt denies pain or complaints on arrival. A/O X1 on arrival

## 2021-03-11 NOTE — ED PROVIDER NOTES
61-year-old male presents to the emergency department for unclear reasons. States his \"old lady left and he was confused \". Reports ambulating normal at home. Denies falling or hitting his head. Denies muscle strength or sensation abnormalities. Patient denies recent fever, chills, cough, congestion, chest pain, shortness of breath, nausea, vomiting, diarrhea. Patient is oriented to person and place, reported that it was possibly 2017 but states he does not follow the year often. Does endorse a history of alcohol use. Past Medical History:   Diagnosis Date    Anxiety     Dermatophytosis of nail     Diabetes ([facility] [address] 9/12/2014    Diabetic polyneuropathy ([facility] [address]     Heart murmur     AORTIC SYSTOLIC FLOW MURMUR, [address]    Hepatic encephalopathy ([facility] [address] 5/5/2014    Hyperlipidemia     Hypertension     Metatarsal stress fracture     Metatarsalgia     Osteoarthritis     Peripheral edema     Psychiatric disorder     depression    UTI (urinary tract infection)        Past Surgical History:   Procedure Laterality Date    HX HERNIA REPAIR  2013    HX LAPAROTOMY  2012    ABDOMINAL SURGERY     HX OTHER SURGICAL      cyst removed from back x 1 week    NEUROLOGICAL PROCEDURE UNLISTED      plate in  head. .         Family History:   Problem Relation Age of Onset    Colon Cancer Mother     Stroke Father     Hypertension Sister        Social History     Socioeconomic History    Marital status:      Spouse name: Not on file    Number of children: Not on file    Years of education: Not on file    Highest education level: Not on file   Occupational History    Not on file   Social Needs    Financial resource strain: Not on file    Food insecurity     Worry: Not on file     Inability: Not on file    Transportation needs     Medical: Not on file     Non-medical: Not on file   Tobacco Use    Smoking status: Current Some Day Smoker    Smokeless tobacco: Never Used    Tobacco comment: LIGHT TOBACCO SMOKER 1-2 A DAY    Substance and Sexual Activity    Alcohol use: No     Alcohol/week: 0.0 standard drinks     Frequency: Never    Drug use: No    Sexual activity: Not Currently   Lifestyle    Physical activity     Days per week: Not on file     Minutes per session: Not on file    Stress: Not on file   Relationships    Social connections     Talks on phone: Not on file     Gets together: Not on file     Attends Rastafarian service: Not on file     Active member of club or organization: Not on file     Attends meetings of clubs or organizations: Not on file     Relationship status: Not on file    Intimate partner violence     Fear of current or ex partner: Not on file     Emotionally abused: Not on file     Physically abused: Not on file     Forced sexual activity: Not on file   Other Topics Concern    Not on file   Social History Narrative    Not on file         ALLERGIES: Pcn [penicillins]    Review of Systems   Constitutional: Negative for chills and fever. HENT: Negative for congestion, rhinorrhea, sinus pressure and sore throat. Eyes: Negative for visual disturbance. Respiratory: Negative for cough and shortness of breath. Cardiovascular: Negative for chest pain. Gastrointestinal: Negative for abdominal pain, blood in stool, diarrhea, nausea and vomiting. Endocrine: Negative for polyuria. Genitourinary: Negative for difficulty urinating and dysuria. Musculoskeletal: Negative for arthralgias, neck pain and neck stiffness. Skin: Negative for color change and rash. Neurological: Negative for syncope, speech difficulty, weakness, numbness and headaches. Psychiatric/Behavioral: Positive for confusion. Negative for behavioral problems and suicidal ideas. All other systems reviewed and are negative.       Vitals:    03/10/21 2318   BP: 115/82   Pulse: 95   Resp: 18   Temp: 98.4 °F (36.9 °C)   SpO2: 98%   Weight: 74.8 kg (165 lb)   Height: 5' 5\" (1.651 m) Physical Exam  Vitals signs and nursing note reviewed. Constitutional:       Appearance: Normal appearance. HENT:      Head: Normocephalic and atraumatic. Nose: Nose normal.      Mouth/Throat:      Mouth: Mucous membranes are moist.   Eyes:      Extraocular Movements: Extraocular movements intact. Neck:      Musculoskeletal: Normal range of motion. Cardiovascular:      Rate and Rhythm: Normal rate and regular rhythm. Heart sounds: Normal heart sounds. Pulmonary:      Effort: Pulmonary effort is normal.      Breath sounds: Normal breath sounds. No wheezing, rhonchi or rales. Abdominal:      General: Abdomen is flat. Palpations: Abdomen is soft. Tenderness: There is no abdominal tenderness. There is no guarding. Musculoskeletal: Normal range of motion. Skin:     General: Skin is warm and dry. Neurological:      General: No focal deficit present. Mental Status: He is alert. GCS: GCS eye subscore is 4. GCS verbal subscore is 5. GCS motor subscore is 6. Cranial Nerves: No cranial nerve deficit, dysarthria or facial asymmetry. Sensory: No sensory deficit. Motor: No weakness. Comments: Oriented to person and place, thought it was 2017. Psychiatric:         Mood and Affect: Mood normal.          MDM  Number of Diagnoses or Management Options  Alcoholic intoxication without complication [facility])  Diagnosis management comments: 81-year-old male presents emerged department with possible confusion. Unclear exactly why patient called EMS. Patient with history of alcohol use as well as hepatic encephalopathy. Will obtain labs and imaging. Labs resulted, urine drug screen with no abnormality, ammonia levels normal, white blood cell count 5.3, stable H&H, Normal electrolytes, creatinine is 1.76, GFR 50. Patient has had intermittent acute kidney injuries in the past.  Patient was advised to continue to orally hydrate with clear liquids.   Decrease alcohol usage. Patient will follow up with family physician in 3 to 4 days for repeat kidney function testing. Patient states he has appointment early next week. Patient's alcohol level was elevated at 188. Head CT without any emergent findings. Chest x-ray without evidence of pneumonia showed abnormal appearance of his left glenohumeral joint. Patient with 4 range of motion of left shoulder without difficulty has no complaints of left shoulder we will not obtain any additional imaging. Patient reports he is ready to be discharged home and is quite antsy to leave. Patient is able to talk without slurred speech, is able to ambulate without staggering. Patient was given food here in the ER, and will be assisted getting a cab home. Patient voiced understanding and agreement with this plan.        Amount and/or Complexity of Data Reviewed  Clinical lab tests: ordered and reviewed  Tests in the radiology section of CPT®: ordered and reviewed  Decide to obtain previous medical records or to obtain history from someone other than the patient: yes  Review and summarize past medical records: yes           Procedures

## 2021-03-11 NOTE — DISCHARGE INSTRUCTIONS
Continue to orally hydrate with clear liquids. Follow-up with primary care physician in 2 to 3 days for repeat kidney function testing. Decreased your alcohol usage. Return to the ER for worsening or worrisome symptoms.

## 2021-03-12 NOTE — DISCHARGE INSTRUCTIONS
Reduce alcohol consumption. Follow-up with your PCP today or early this coming week. Return if any new, worsening or concerning symptoms.

## 2021-03-12 NOTE — ED NOTES
Patient demanding to leave. Caught patient leaving twice, was found outside and other time found in lobby. Provider and Nurse brought patient back to his room. Awaiting ride to pick patient up.

## 2021-03-12 NOTE — ED PROVIDER NOTES
Patient brought in by EMS for altered mental status and seems inebriated admits to drinking wine this evening. He is somewhat belligerent and states he does not usually drink. He denies drug use. He denies headache chest pain or trouble breathing. He denies numbness tingling or weakness. History limited by his apparent alcohol intoxication. He is ambulatory but unsteady on his feet. The history is provided by the patient and the EMS personnel. Past Medical History:   Diagnosis Date    Anxiety     Dermatophytosis of nail     Diabetes ([facility] [address] 9/12/2014    Diabetic polyneuropathy ([facility] [address]     Heart murmur     AORTIC SYSTOLIC FLOW MURMUR, [address]    Hepatic encephalopathy ([facility] [address] 5/5/2014    Hyperlipidemia     Hypertension     Metatarsal stress fracture     Metatarsalgia     Osteoarthritis     Peripheral edema     Psychiatric disorder     depression    UTI (urinary tract infection)        Past Surgical History:   Procedure Laterality Date    HX HERNIA REPAIR  2013    HX LAPAROTOMY  2012    ABDOMINAL SURGERY     HX OTHER SURGICAL      cyst removed from back x 1 week    NEUROLOGICAL PROCEDURE UNLISTED      plate in  head. .         Family History:   Problem Relation Age of Onset    Colon Cancer Mother     Stroke Father     Hypertension Sister        Social History     Socioeconomic History    Marital status:      Spouse name: Not on file    Number of children: Not on file    Years of education: Not on file    Highest education level: Not on file   Occupational History    Not on file   Social Needs    Financial resource strain: Not on file    Food insecurity     Worry: Not on file     Inability: Not on file   [employer] needs     Medical: Not on file     Non-medical: Not on file   Tobacco Use    Smoking status: Current Some Day Smoker    Smokeless tobacco: Never Used    Tobacco comment: LIGHT TOBACCO SMOKER 1-2 A DAY    Substance and Sexual Activity    Alcohol use: No     Alcohol/week: 0.0 standard drinks     Frequency: Never    Drug use: No    Sexual activity: Not Currently   Lifestyle    Physical activity     Days per week: Not on file     Minutes per session: Not on file    Stress: Not on file   Relationships    Social connections     Talks on phone: Not on file     Gets together: Not on file     Attends Pentecostal service: Not on file     Active member of club or organization: Not on file     Attends meetings of clubs or organizations: Not on file     Relationship status: Not on file    Intimate partner violence     Fear of current or ex partner: Not on file     Emotionally abused: Not on file     Physically abused: Not on file     Forced sexual activity: Not on file   Other Topics Concern    Not on file   Social History Narrative    Not on file         ALLERGIES: Pcn [penicillins]    Review of Systems   Unable to perform ROS: Other       Vitals:    03/11/21 2132   BP: 135/76   Pulse: (!) 102   Resp: 18   Temp: 98.5 °F (36.9 °C)   SpO2: 95%            Physical Exam  Vitals signs and nursing note reviewed. Constitutional:       Comments: Intoxicated appearing and sounding with slurred speech   HENT:      Head: Normocephalic and atraumatic. Right Ear: External ear normal.      Left Ear: External ear normal.      Nose: Nose normal.      Mouth/Throat:      Mouth: Mucous membranes are moist.   Eyes:      Pupils: Pupils are equal, round, and reactive to light. Neck:      Musculoskeletal: No muscular tenderness. Cardiovascular:      Rate and Rhythm: Normal rate and regular rhythm. Heart sounds: Normal heart sounds. Pulmonary:      Effort: Pulmonary effort is normal.      Breath sounds: Normal breath sounds. Abdominal:      General: There is no distension. Palpations: Abdomen is soft. Tenderness: There is no abdominal tenderness. Musculoskeletal: Normal range of motion. Lymphadenopathy:      Cervical: No cervical adenopathy.    Skin: General: Skin is warm and dry. Neurological:      Mental Status: He is alert. Sensory: No sensory deficit. Motor: No weakness. Comments: Alert and oriented x2          MDM  Number of Diagnoses or Management Options  Diagnosis management comments: Check basic labs and alcohol. Monitor here until sober and able to walk with a more stable gait. 11:26 PM  Patient has now wandered off twice and had to be chased down in the parking lot first time by security and then was found in the waiting room by myself. We have walked him back to his room he states he is called a friend to come get him. I have told him that he can be discharged home with a sober friend/adult when they arrive but otherwise he will need to stay here until he is sober. The other option is police involvement and arrest for public intoxication. Patient still ambulates with a stagger and has slurred speech.        Amount and/or Complexity of Data Reviewed  Clinical lab tests: ordered and reviewed (Results for orders placed or performed during the hospital encounter of 03/11/21  -CBC WITH AUTOMATED DIFF       Result                      Value             Ref Range           WBC                         4.6               4.3 - 11.1 K*       RBC                         4.93              4.23 - 5.6 M*       HGB                         16.4              13.6 - 17.2 *       HCT                         47.0              41.1 - 50.3 %       MCV                         95.3              79.6 - 97.8 *       MCH                         33.3 (H)          26.1 - 32.9 *       MCHC                        34.9              31.4 - 35.0 *       RDW                         13.5              11.9 - 14.6 %       PLATELET                    147 (L)           150 - 450 K/*       MPV                         9.9               9.4 - 12.3 FL       ABSOLUTE NRBC               0.00              0.0 - 0.2 K/*       DF                          AUTOMATED NEUTROPHILS                 60                43 - 78 %           LYMPHOCYTES                 29                13 - 44 %           MONOCYTES                   10                4.0 - 12.0 %        EOSINOPHILS                 1                 0.5 - 7.8 %         BASOPHILS                   1                 0.0 - 2.0 %         IMMATURE GRANULOCYTES       0                 0.0 - 5.0 %         ABS. NEUTROPHILS            2.8               1.7 - 8.2 K/*       ABS. LYMPHOCYTES            1.3               0.5 - 4.6 K/*       ABS. MONOCYTES              0.5               0.1 - 1.3 K/*       ABS. EOSINOPHILS            0.0               0.0 - 0.8 K/*       ABS. BASOPHILS              0.0               0.0 - 0.2 K/*       ABS. IMM. GRANS.            0.0               0.0 - 0.5 K/*  -METABOLIC PANEL, COMPREHENSIVE       Result                      Value             Ref Range           Sodium                      142               136 - 145 mm*       Potassium                   3.6               3.5 - 5.1 mm*       Chloride                    111 (H)           98 - 107 mmo*       CO2                         21                21 - 32 mmol*       Anion gap                   10                7 - 16 mmol/L       Glucose                     101 (H)           65 - 100 mg/*       BUN                         15                8 - 23 MG/DL        Creatinine                  1.53 (H)          0.8 - 1.5 MG*       GFR est AA                  58 (L)            >60 ml/min/1*       GFR est non-AA              48 (L)            >60 ml/min/1*       Calcium                     8.9               8.3 - 10.4 M*       Bilirubin, total            1.0               0.2 - 1.1 MG*       ALT (SGPT)                  36                12 - 65 U/L         AST (SGOT)                  30                15 - 37 U/L         Alk.  phosphatase            124               50 - 136 U/L        Protein, total              7.5               6.3 - 8.2 g/*       Albumin                     3.9               3.2 - 4.6 g/*       Globulin                    3.6 (H)           2.3 - 3.5 g/*       A-G Ratio                   1.1 (L)           1.2 - 3.5      -ETHYL ALCOHOL       Result                      Value             Ref Range           ALCOHOL(ETHYL),SERUM        251               MG/DL          )    Risk of Complications, Morbidity, and/or Mortality  Presenting problems: low  Diagnostic procedures: low  Management options: low    Patient Progress  Patient progress: stable         Procedures

## 2021-03-12 NOTE — ED TRIAGE NOTES
Pt arrives via [facility] from home not wearing a mask. EMS reports altered mental status. Pt is alert to person and place only at this time. Pt admits to \"a little bit of wine\" this evening. Pt denies pain of any kind, fevers, SOB. When asked why pt is in the emergency room, pt states \"women\" and will not elaborate any further.

## 2021-05-26 ENCOUNTER — HOSPITAL ENCOUNTER (EMERGENCY)
Age: 71
Discharge: HOME | End: 2021-05-26
Attending: EMERGENCY MEDICINE
Payer: MEDICARE

## 2021-05-26 VITALS
WEIGHT: 165 LBS | RESPIRATION RATE: 17 BRPM | DIASTOLIC BLOOD PRESSURE: 96 MMHG | SYSTOLIC BLOOD PRESSURE: 161 MMHG | HEIGHT: 66 IN | OXYGEN SATURATION: 97 % | HEART RATE: 75 BPM | TEMPERATURE: 99.1 F | BODY MASS INDEX: 26.52 KG/M2

## 2021-05-26 DIAGNOSIS — I10 HYPERTENSION, UNSPECIFIED TYPE: Primary | ICD-10-CM

## 2021-05-26 PROCEDURE — 99283 EMERGENCY DEPT VISIT LOW MDM: CPT

## 2021-05-26 PROCEDURE — 74011250637 HC RX REV CODE- 250/637: Performed by: PHYSICIAN ASSISTANT

## 2021-05-26 RX ORDER — AMLODIPINE BESYLATE 10 MG/1
10 TABLET ORAL
Status: COMPLETED | OUTPATIENT
Start: 2021-05-26 | End: 2021-05-26

## 2021-05-26 RX ADMIN — AMLODIPINE BESYLATE 10 MG: 10 TABLET ORAL at 14:39

## 2021-05-26 NOTE — ED NOTES
I have reviewed discharge instructions with the patient. The patient verbalized understanding. Patient left ED via Discharge Method: ambulatory to Home with self. Opportunity for questions and clarification provided. Patient given 0 scripts. To continue your aftercare when you leave the hospital, you may receive an automated call from our care team to check in on how you are doing. This is a free service and part of our promise to provide the best care and service to meet your aftercare needs.  If you have questions, or wish to unsubscribe from this service please call [phone]. Thank you for Choosing our [facility] Emergency Department.

## 2021-05-26 NOTE — DISCHARGE INSTRUCTIONS
Make sure you take your blood pressure medications every morning. Follow a low sodium/salt diet. Keep your appointment with your primary care physician and return to the ED if worsening in any way.

## 2021-12-02 NOTE — ED TRIAGE NOTES
Pt arrives via POV c/o high blood pressure, denies chest pain. Pt reports dizziness with exertion but no other symptoms. No other medical hx. Pt ambulatory to triage.  States compliant with home bp medications and did not take this am. Yes

## 2022-03-18 PROBLEM — E66.3 OVERWEIGHT (BMI 25.0-29.9): Status: ACTIVE | Noted: 2018-12-12

## 2022-03-19 PROBLEM — E11.21 TYPE 2 DIABETES WITH NEPHROPATHY (HCC): Status: ACTIVE | Noted: 2019-01-21

## 2022-03-19 PROBLEM — I25.10 CORONARY ARTERY DISEASE INVOLVING NATIVE CORONARY ARTERY OF NATIVE HEART: Status: ACTIVE | Noted: 2019-02-19

## 2022-03-19 PROBLEM — R11.10 VOMITING: Status: ACTIVE | Noted: 2019-02-11

## 2022-03-20 PROBLEM — I48.91 ATRIAL FIBRILLATION (HCC): Status: ACTIVE | Noted: 2019-02-11

## 2023-04-26 NOTE — PROGRESS NOTES
Bedside and Verbal shift change report given to self (oncoming nurse) by Salvador RN and Maria M RN (offgoing nurse). Report included the following information SBAR, Kardex, MAR and Recent Results. within normal limits

## 2024-09-27 NOTE — ED TRIAGE NOTES
Pt arrives via ems from friend's residence. Ems reports pt walked into the friend's house unannounced and started using the bathroom, when he fell off the toilet. Pt denied ETOH to ems, but they state he smells of alcohol. vss en route. Pt unable to keep story straight. Reports he fell two days ago. Reports bilateral shoulder pain, but then states his left shoulder has hurt \"for a long time\".
27-Sep-2024 18:54

## 2024-10-04 ENCOUNTER — HOSPITAL ENCOUNTER (EMERGENCY)
Age: 74
Discharge: HOME | End: 2024-10-04
Attending: EMERGENCY MEDICINE
Payer: MEDICARE

## 2024-10-04 VITALS
SYSTOLIC BLOOD PRESSURE: 138 MMHG | TEMPERATURE: 98.6 F | RESPIRATION RATE: 15 BRPM | DIASTOLIC BLOOD PRESSURE: 75 MMHG | HEART RATE: 88 BPM | OXYGEN SATURATION: 99 %

## 2024-10-04 DIAGNOSIS — N39.0 URINARY TRACT INFECTION IN MALE: Primary | ICD-10-CM

## 2024-10-04 LAB
ALBUMIN SERPL-MCNC: 4.2 G/DL (ref 3.2–4.6)
ALBUMIN/GLOB SERPL: 1.3 (ref 1–1.9)
ALP SERPL-CCNC: 104 U/L (ref 40–129)
ALT SERPL-CCNC: 9 U/L (ref 8–55)
ANION GAP SERPL CALC-SCNC: 12 MMOL/L (ref 9–18)
APPEARANCE UR: CLEAR
AST SERPL-CCNC: 31 U/L (ref 15–37)
BACTERIA URNS QL MICRO: ABNORMAL /HPF
BASOPHILS # BLD: 0 K/UL (ref 0–0.2)
BASOPHILS NFR BLD: 1 % (ref 0–2)
BILIRUB SERPL-MCNC: 1.6 MG/DL (ref 0–1.2)
BILIRUB UR QL: ABNORMAL
BUN SERPL-MCNC: 14 MG/DL (ref 8–23)
CALCIUM SERPL-MCNC: 9.7 MG/DL (ref 8.8–10.2)
CASTS URNS QL MICRO: 0 /LPF
CHLORIDE SERPL-SCNC: 97 MMOL/L (ref 98–107)
CO2 SERPL-SCNC: 26 MMOL/L (ref 20–28)
COLOR UR: ABNORMAL
CREAT SERPL-MCNC: 1.25 MG/DL (ref 0.8–1.3)
CRYSTALS URNS QL MICRO: 0 /LPF
DIFFERENTIAL METHOD BLD: ABNORMAL
EOSINOPHIL # BLD: 0 K/UL (ref 0–0.8)
EOSINOPHIL NFR BLD: 0 % (ref 0.5–7.8)
EPI CELLS #/AREA URNS HPF: ABNORMAL /HPF
ERYTHROCYTE [DISTWIDTH] IN BLOOD BY AUTOMATED COUNT: 13.1 % (ref 11.9–14.6)
GLOBULIN SER CALC-MCNC: 3.3 G/DL (ref 2.3–3.5)
GLUCOSE SERPL-MCNC: 106 MG/DL (ref 70–99)
GLUCOSE UR STRIP.AUTO-MCNC: NEGATIVE MG/DL
HCT VFR BLD AUTO: 45.5 % (ref 41.1–50.3)
HGB BLD-MCNC: 15.9 G/DL (ref 13.6–17.2)
HGB UR QL STRIP: NEGATIVE
IMM GRANULOCYTES # BLD AUTO: 0 K/UL (ref 0–0.5)
IMM GRANULOCYTES NFR BLD AUTO: 0 % (ref 0–5)
KETONES UR QL STRIP.AUTO: 15 MG/DL
LEUKOCYTE ESTERASE UR QL STRIP.AUTO: ABNORMAL
LIPASE SERPL-CCNC: 39 U/L (ref 13–60)
LYMPHOCYTES # BLD: 1.1 K/UL (ref 0.5–4.6)
LYMPHOCYTES NFR BLD: 20 % (ref 13–44)
MCH RBC QN AUTO: 32.3 PG (ref 26.1–32.9)
MCHC RBC AUTO-ENTMCNC: 34.9 G/DL (ref 31.4–35)
MCV RBC AUTO: 92.5 FL (ref 82–102)
MONOCYTES # BLD: 0.4 K/UL (ref 0.1–1.3)
MONOCYTES NFR BLD: 7 % (ref 4–12)
MUCOUS THREADS URNS QL MICRO: ABNORMAL /LPF
NEUTS SEG # BLD: 3.9 K/UL (ref 1.7–8.2)
NEUTS SEG NFR BLD: 72 % (ref 43–78)
NITRITE UR QL STRIP.AUTO: NEGATIVE
NRBC # BLD: 0 K/UL (ref 0–0.2)
OTHER OBSERVATIONS: ABNORMAL
PH UR STRIP: 6 (ref 5–9)
PLATELET # BLD AUTO: 168 K/UL (ref 150–450)
PMV BLD AUTO: 9.9 FL (ref 9.4–12.3)
POTASSIUM SERPL-SCNC: ABNORMAL MMOL/L (ref 3.5–5.1)
PROT SERPL-MCNC: 7.5 G/DL (ref 6.3–8.2)
PROT UR STRIP-MCNC: 30 MG/DL
RBC # BLD AUTO: 4.92 M/UL (ref 4.23–5.6)
RBC #/AREA URNS HPF: ABNORMAL /HPF
SODIUM SERPL-SCNC: 135 MMOL/L (ref 136–145)
SP GR UR REFRACTOMETRY: 1.03 (ref 1–1.02)
URINE CULTURE IF INDICATED: ABNORMAL
UROBILINOGEN UR QL STRIP.AUTO: 2 EU/DL (ref 0.2–1)
WBC # BLD AUTO: 5.4 K/UL (ref 4.3–11.1)
WBC URNS QL MICRO: ABNORMAL /HPF

## 2024-10-04 PROCEDURE — 85025 COMPLETE CBC W/AUTO DIFF WBC: CPT

## 2024-10-04 PROCEDURE — 6370000000 HC RX 637 (ALT 250 FOR IP): Performed by: EMERGENCY MEDICINE

## 2024-10-04 PROCEDURE — 81001 URINALYSIS AUTO W/SCOPE: CPT

## 2024-10-04 PROCEDURE — 99283 EMERGENCY DEPT VISIT LOW MDM: CPT

## 2024-10-04 PROCEDURE — 83690 ASSAY OF LIPASE: CPT

## 2024-10-04 PROCEDURE — 80053 COMPREHEN METABOLIC PANEL: CPT

## 2024-10-04 RX ORDER — ONDANSETRON 2 MG/ML
4 INJECTION INTRAMUSCULAR; INTRAVENOUS ONCE
Status: DISCONTINUED | OUTPATIENT
Start: 2024-10-04 | End: 2024-10-04

## 2024-10-04 RX ORDER — CIPROFLOXACIN 250 MG/1
250 TABLET, FILM COATED ORAL 2 TIMES DAILY
Qty: 14 TABLET | Refills: 0 | Status: SHIPPED | OUTPATIENT
Start: 2024-10-04 | End: 2024-10-11

## 2024-10-04 RX ORDER — CIPROFLOXACIN 500 MG/1
250 TABLET, FILM COATED ORAL
Status: COMPLETED | OUTPATIENT
Start: 2024-10-04 | End: 2024-10-04

## 2024-10-04 RX ORDER — MORPHINE SULFATE 4 MG/ML
4 INJECTION, SOLUTION INTRAMUSCULAR; INTRAVENOUS
Status: DISCONTINUED | OUTPATIENT
Start: 2024-10-04 | End: 2024-10-04

## 2024-10-04 RX ORDER — HYDROCODONE BITARTRATE AND ACETAMINOPHEN 5; 325 MG/1; MG/1
1 TABLET ORAL
Status: COMPLETED | OUTPATIENT
Start: 2024-10-04 | End: 2024-10-04

## 2024-10-04 RX ADMIN — HYDROCODONE BITARTRATE AND ACETAMINOPHEN 1 TABLET: 5; 325 TABLET ORAL at 21:50

## 2024-10-04 RX ADMIN — CIPROFLOXACIN 250 MG: 500 TABLET, FILM COATED ORAL at 21:50

## 2024-10-04 ASSESSMENT — PAIN DESCRIPTION - DESCRIPTORS
DESCRIPTORS: ACHING
DESCRIPTORS: ACHING

## 2024-10-04 ASSESSMENT — PAIN SCALES - GENERAL
PAINLEVEL_OUTOF10: 4
PAINLEVEL_OUTOF10: 5

## 2024-10-04 ASSESSMENT — LIFESTYLE VARIABLES
HOW OFTEN DO YOU HAVE A DRINK CONTAINING ALCOHOL: NEVER
HOW MANY STANDARD DRINKS CONTAINING ALCOHOL DO YOU HAVE ON A TYPICAL DAY: PATIENT DOES NOT DRINK

## 2024-10-04 ASSESSMENT — PAIN DESCRIPTION - LOCATION
LOCATION: ABDOMEN
LOCATION: ABDOMEN

## 2024-10-04 NOTE — ED PROVIDER NOTES
Emergency Department Provider Note       PCP: Mahnaz Landaverde MD   Age: 73 y.o.   Sex: male     DISPOSITION Discharge - Pending Orders Complete 10/04/2024 09:31:55 PM  Condition at Disposition: Data Unavailable       ICD-10-CM    1. Urinary tract infection in male  N39.0           Medical Decision Making     Patient is being evaluated for his complaint of abdominal pain.  He states that he has not been able to urinate for the past 2 days.  Bladder scan will be performed to look for any signs of a full bladder that he cannot empty signifying an outlet obstruction.  Blood work be obtained look for any sign of renal dysfunction, infection, or other process.  Patient was ordered IV nausea and pain medication.  ED Course as of 10/04/24 2134   Fri Oct 04, 2024   1959 Patient's blood work does not show any leukocytosis or left shift.  He has normal renal function.  Waiting for results of urinalysis. [KIMMIE]   2131 Results of the patient's urinalysis does show 5-10 white blood cells with 1+ bacteria.  Patient will be treated for urinary tract infection.  He was given his first dose of oral antibiotics in the ER. [KIMMIE]      ED Course User Index  [KIMMIE] Kayla Silva, DO     1 or more acute illnesses that pose a threat to life or bodily function.   Prescription drug management performed.  Shared medical decision making was utilized in creating the patients health plan today.  Considerations: The following items were considered but not ordered: Considered CT imaging of the abdomen pelvis due to the patient's age however blood work was reassuring.  Urinalysis and physical exam consistent with UTI.  No signs of pyelonephritis..  I independently ordered and reviewed each unique test.                         History     Patient is a 73-year-old male present with chief complaint of abdominal pain.  He states the pain is located in his middle abdomen.  Is been there for the past 2 to 3 days.  He has been unable to  0.2 - 1.0 EU/dL    Nitrite, Urine Negative NEG      Leukocyte Esterase, Urine TRACE (A) NEG      WBC, UA 5-10 0 /hpf    RBC, UA 5-10 0 /hpf    BACTERIA, URINE 1+ (H) 0 /hpf    Urine Culture if Indicated CULTURE NOT INDICATED BY UA RESULT      Epithelial Cells, UA 3-5 0 /hpf    Casts 0 0 /lpf    Crystals 0 0 /LPF    Mucus, UA 4+ (H) 0 /lpf    Other observations RESULTS VERIFIED MANUALLY           No orders to display                No results for input(s): \"COVID19\" in the last 72 hours.     Voice dictation software was used during the making of this note.  This software is not perfect and grammatical and other typographical errors may be present.  This note has not been completely proofread for errors.      Kayla Silva,   10/04/24 5291

## 2024-10-05 NOTE — ED NOTES
Patient mobility status  wheelchair bound. Provider aware     I have reviewed discharge instructions with the patient.  The patient verbalized understanding.    Patient left ED via Discharge Method: wheelchair to Home with Spouse.    Opportunity for questions and clarification provided.     Patient given 1 scripts.            Hilda Dominguez RN  10/04/24 3195

## 2024-10-12 ENCOUNTER — HOSPITAL ENCOUNTER (EMERGENCY)
Age: 74
Discharge: HOME | DRG: 871 | End: 2024-10-13
Payer: MEDICARE

## 2024-10-12 ENCOUNTER — APPOINTMENT (OUTPATIENT)
Dept: CT IMAGING | Age: 74
DRG: 871 | End: 2024-10-12
Payer: MEDICARE

## 2024-10-12 DIAGNOSIS — K59.00 CONSTIPATION, UNSPECIFIED CONSTIPATION TYPE: ICD-10-CM

## 2024-10-12 DIAGNOSIS — R10.84 GENERALIZED ABDOMINAL PAIN: Primary | ICD-10-CM

## 2024-10-12 LAB
ALBUMIN SERPL-MCNC: 3.6 G/DL (ref 3.2–4.6)
ALBUMIN/GLOB SERPL: 1.2 (ref 1–1.9)
ALP SERPL-CCNC: 80 U/L (ref 40–129)
ALT SERPL-CCNC: 8 U/L (ref 8–55)
ANION GAP SERPL CALC-SCNC: 13 MMOL/L (ref 9–18)
AST SERPL-CCNC: 15 U/L (ref 15–37)
BASOPHILS # BLD: 0 K/UL (ref 0–0.2)
BASOPHILS NFR BLD: 0 % (ref 0–2)
BILIRUB SERPL-MCNC: 0.8 MG/DL (ref 0–1.2)
BUN SERPL-MCNC: 16 MG/DL (ref 8–23)
CALCIUM SERPL-MCNC: 9.3 MG/DL (ref 8.8–10.2)
CHLORIDE SERPL-SCNC: 102 MMOL/L (ref 98–107)
CO2 SERPL-SCNC: 26 MMOL/L (ref 20–28)
CREAT SERPL-MCNC: 1.13 MG/DL (ref 0.8–1.3)
DIFFERENTIAL METHOD BLD: ABNORMAL
EOSINOPHIL # BLD: 0 K/UL (ref 0–0.8)
EOSINOPHIL NFR BLD: 0 % (ref 0.5–7.8)
ERYTHROCYTE [DISTWIDTH] IN BLOOD BY AUTOMATED COUNT: 13 % (ref 11.9–14.6)
GLOBULIN SER CALC-MCNC: 3 G/DL (ref 2.3–3.5)
GLUCOSE SERPL-MCNC: 131 MG/DL (ref 70–99)
HCT VFR BLD AUTO: 34.8 % (ref 41.1–50.3)
HGB BLD-MCNC: 12.2 G/DL (ref 13.6–17.2)
IMM GRANULOCYTES # BLD AUTO: 0 K/UL (ref 0–0.5)
IMM GRANULOCYTES NFR BLD AUTO: 0 % (ref 0–5)
LACTATE SERPL-SCNC: 1.5 MMOL/L (ref 0.5–2)
LIPASE SERPL-CCNC: 45 U/L (ref 13–60)
LYMPHOCYTES # BLD: 1.2 K/UL (ref 0.5–4.6)
LYMPHOCYTES NFR BLD: 15 % (ref 13–44)
MCH RBC QN AUTO: 32.4 PG (ref 26.1–32.9)
MCHC RBC AUTO-ENTMCNC: 35.1 G/DL (ref 31.4–35)
MCV RBC AUTO: 92.6 FL (ref 82–102)
MONOCYTES # BLD: 0.5 K/UL (ref 0.1–1.3)
MONOCYTES NFR BLD: 7 % (ref 4–12)
NEUTS SEG # BLD: 6 K/UL (ref 1.7–8.2)
NEUTS SEG NFR BLD: 77 % (ref 43–78)
NRBC # BLD: 0 K/UL (ref 0–0.2)
PLATELET # BLD AUTO: 221 K/UL (ref 150–450)
PMV BLD AUTO: 9.5 FL (ref 9.4–12.3)
POTASSIUM SERPL-SCNC: 3.4 MMOL/L (ref 3.5–5.1)
PROT SERPL-MCNC: 6.6 G/DL (ref 6.3–8.2)
RBC # BLD AUTO: 3.76 M/UL (ref 4.23–5.6)
SODIUM SERPL-SCNC: 141 MMOL/L (ref 136–145)
WBC # BLD AUTO: 7.8 K/UL (ref 4.3–11.1)

## 2024-10-12 PROCEDURE — 6370000000 HC RX 637 (ALT 250 FOR IP): Performed by: NURSE PRACTITIONER

## 2024-10-12 PROCEDURE — 83605 ASSAY OF LACTIC ACID: CPT

## 2024-10-12 PROCEDURE — 85025 COMPLETE CBC W/AUTO DIFF WBC: CPT

## 2024-10-12 PROCEDURE — 80053 COMPREHEN METABOLIC PANEL: CPT

## 2024-10-12 PROCEDURE — 83690 ASSAY OF LIPASE: CPT

## 2024-10-12 PROCEDURE — 96374 THER/PROPH/DIAG INJ IV PUSH: CPT

## 2024-10-12 PROCEDURE — 74177 CT ABD & PELVIS W/CONTRAST: CPT

## 2024-10-12 PROCEDURE — 6360000004 HC RX CONTRAST MEDICATION: Performed by: NURSE PRACTITIONER

## 2024-10-12 PROCEDURE — 6360000002 HC RX W HCPCS: Performed by: NURSE PRACTITIONER

## 2024-10-12 PROCEDURE — 99285 EMERGENCY DEPT VISIT HI MDM: CPT

## 2024-10-12 RX ORDER — POLYETHYLENE GLYCOL 3350 17 G/17G
17 POWDER, FOR SOLUTION ORAL DAILY
Qty: 238 G | Refills: 0 | Status: ON HOLD | OUTPATIENT
Start: 2024-10-12 | End: 2024-11-11

## 2024-10-12 RX ORDER — DIATRIZOATE MEGLUMINE AND DIATRIZOATE SODIUM 660; 100 MG/ML; MG/ML
15 SOLUTION ORAL; RECTAL
Status: DISCONTINUED | OUTPATIENT
Start: 2024-10-12 | End: 2024-10-13 | Stop reason: HOSPADM

## 2024-10-12 RX ORDER — KETOROLAC TROMETHAMINE 15 MG/ML
15 INJECTION, SOLUTION INTRAMUSCULAR; INTRAVENOUS ONCE
Status: COMPLETED | OUTPATIENT
Start: 2024-10-12 | End: 2024-10-12

## 2024-10-12 RX ORDER — IOPAMIDOL 755 MG/ML
100 INJECTION, SOLUTION INTRAVASCULAR
Status: COMPLETED | OUTPATIENT
Start: 2024-10-12 | End: 2024-10-12

## 2024-10-12 RX ORDER — POLYETHYLENE GLYCOL 3350 17 G/17G
17 POWDER, FOR SOLUTION ORAL ONCE
Status: COMPLETED | OUTPATIENT
Start: 2024-10-12 | End: 2024-10-12

## 2024-10-12 RX ORDER — ONDANSETRON 4 MG/1
4 TABLET, ORALLY DISINTEGRATING ORAL ONCE
Status: COMPLETED | OUTPATIENT
Start: 2024-10-12 | End: 2024-10-12

## 2024-10-12 RX ADMIN — KETOROLAC TROMETHAMINE 15 MG: 15 INJECTION, SOLUTION INTRAMUSCULAR; INTRAVENOUS at 23:48

## 2024-10-12 RX ADMIN — ONDANSETRON 4 MG: 4 TABLET, ORALLY DISINTEGRATING ORAL at 22:19

## 2024-10-12 RX ADMIN — POLYETHYLENE GLYCOL 3350 17 G: 17 POWDER, FOR SOLUTION ORAL at 23:47

## 2024-10-12 RX ADMIN — IOPAMIDOL 100 ML: 755 INJECTION, SOLUTION INTRAVENOUS at 22:59

## 2024-10-12 RX ADMIN — DIATRIZOATE MEGLUMINE AND DIATRIZOATE SODIUM 15 ML: 660; 100 LIQUID ORAL; RECTAL at 22:21

## 2024-10-12 ASSESSMENT — ENCOUNTER SYMPTOMS
NAUSEA: 1
VOMITING: 1
ABDOMINAL PAIN: 1
CONSTIPATION: 1
SHORTNESS OF BREATH: 0

## 2024-10-12 ASSESSMENT — PAIN - FUNCTIONAL ASSESSMENT: PAIN_FUNCTIONAL_ASSESSMENT: 0-10

## 2024-10-12 ASSESSMENT — PAIN DESCRIPTION - PAIN TYPE: TYPE: ACUTE PAIN

## 2024-10-12 ASSESSMENT — PAIN DESCRIPTION - LOCATION: LOCATION: ABDOMEN

## 2024-10-12 ASSESSMENT — PAIN SCALES - GENERAL: PAINLEVEL_OUTOF10: 10

## 2024-10-12 ASSESSMENT — PAIN DESCRIPTION - DESCRIPTORS: DESCRIPTORS: ACHING

## 2024-10-13 VITALS
SYSTOLIC BLOOD PRESSURE: 136 MMHG | WEIGHT: 160 LBS | HEIGHT: 67 IN | DIASTOLIC BLOOD PRESSURE: 87 MMHG | RESPIRATION RATE: 17 BRPM | OXYGEN SATURATION: 96 % | TEMPERATURE: 98.2 F | HEART RATE: 81 BPM | BODY MASS INDEX: 25.11 KG/M2

## 2024-10-13 NOTE — ED NOTES
Patient mobility status  with difficulty, uses a cane. Provider aware     I have reviewed discharge instructions with the patient.  The patient verbalized understanding.    Patient left ED via Discharge Method: ambulatory to Home with Significant Other.    Opportunity for questions and clarification provided.     Patient given 1 scripts.           Mirta Jauregui RN  10/13/24 0007

## 2024-10-13 NOTE — ED PROVIDER NOTES
Emergency Department Provider Note       PCP: Mahnaz Landaverde MD   Age: 74 y.o.   Sex: male     DISPOSITION Decision To Discharge 10/12/2024 11:48:26 PM  Condition at Disposition: Data Unavailable       ICD-10-CM    1. Generalized abdominal pain  R10.84       2. Constipation, unspecified constipation type  K59.00           Medical Decision Making     74-year-old male with history of diabetes, hypertension, and hyperlipidemia presents emergency department today with complaint of abdominal pain, constipation, decreased appetite.  He appears in no acute distress.  He has generalized abdominal tenderness on exam.  Bowel sounds are present throughout.  He is afebrile.  Slightly tachycardic.  Will check labs and imaging today.  Patient was recently seen in the emergency department for abdominal pain and was diagnosed with a UTI.      Constipation noted on CT.  Discussed daily MiraLAX.  Labs reassuring.  No leukocytosis.  No electrolyte derangement.  Slight drop in hemoglobin noted.  Patient denies any dark or tarry stools.  He denies any bleeding.  Encouraged close follow-up with PCP for recheck.  Red flag symptoms and ER return precautions discussed but he appears stable for discharge.     1 or more acute illnesses that pose a threat to life or bodily function.   Over the counter drug management performed.  Prescription drug management performed.  Shared medical decision making was utilized in creating the patients health plan today.  I independently ordered and reviewed each unique test.    I reviewed external records: ED visit note from an outside group.     I interpreted the CT Scan no obstruction.              History     74-year-old male with a history of diabetes, hypertension, and hyperlipidemia presents to the emergency department today with complaint of abdominal pain, constipation, and decreased appetite.  Symptoms started 3 days ago.  He denies any chest pain, shortness of breath, vomiting, diarrhea,

## 2024-10-13 NOTE — DISCHARGE INSTRUCTIONS
Your labs are normal. Your CT shows a lot of constipation. Take Miralax daily as prescribed.  Take over-the-counter Tylenol or ibuprofen if needed for pain.  Make sure you are staying well-hydrated.  If you still do not have much of an appetite, purchase an over-the-counter nutrition drink like boost or a protein drink so that you are getting in some nutrients. Follow-up with your primary care provider for a recheck of your symptoms. Return to the ER for any new, worsening, or concerning symptoms.

## 2024-10-15 ENCOUNTER — APPOINTMENT (OUTPATIENT)
Dept: GENERAL RADIOLOGY | Age: 74
DRG: 871 | End: 2024-10-15
Payer: MEDICARE

## 2024-10-15 ENCOUNTER — HOSPITAL ENCOUNTER (INPATIENT)
Age: 74
LOS: 21 days | Discharge: NURSING, REHAB OR LONG-TERM CARE | DRG: 871 | End: 2024-11-05
Attending: EMERGENCY MEDICINE
Payer: MEDICARE

## 2024-10-15 ENCOUNTER — APPOINTMENT (OUTPATIENT)
Dept: CT IMAGING | Age: 74
DRG: 871 | End: 2024-10-15
Payer: MEDICARE

## 2024-10-15 DIAGNOSIS — E86.0 DEHYDRATION: ICD-10-CM

## 2024-10-15 DIAGNOSIS — A41.9 SEPTICEMIA (HCC): Primary | ICD-10-CM

## 2024-10-15 DIAGNOSIS — N17.9 ACUTE KIDNEY INJURY (HCC): ICD-10-CM
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
## 2024-10-15 DIAGNOSIS — R65.20 SEVERE SEPSIS (HCC): ICD-10-CM

## 2024-10-15 DIAGNOSIS — A41.9 SEVERE SEPSIS (HCC): ICD-10-CM

## 2024-10-15 PROBLEM — E87.20 LACTIC ACID INCREASED: Status: ACTIVE | Noted: 2024-10-15

## 2024-10-15 PROBLEM — D72.829 LEUKOCYTOSIS: Status: ACTIVE | Noted: 2024-10-15

## 2024-10-15 PROBLEM — K59.00 CONSTIPATION: Status: ACTIVE | Noted: 2024-10-15

## 2024-10-15 LAB
ALBUMIN SERPL-MCNC: 3.4 G/DL (ref 3.2–4.6)
ALBUMIN/GLOB SERPL: 1 (ref 1–1.9)
ALP SERPL-CCNC: 75 U/L (ref 40–129)
ALT SERPL-CCNC: 9 U/L (ref 8–55)
AMORPH CRY URNS QL MICRO: ABNORMAL
ANION GAP SERPL CALC-SCNC: 25 MMOL/L (ref 9–18)
APPEARANCE UR: ABNORMAL
AST SERPL-CCNC: 20 U/L (ref 15–37)
B PERT DNA SPEC QL NAA+PROBE: NOT DETECTED
BACTERIA URNS QL MICRO: ABNORMAL /HPF
BASOPHILS # BLD: 0 K/UL (ref 0–0.2)
BASOPHILS NFR BLD: 0 % (ref 0–2)
BILIRUB SERPL-MCNC: 0.7 MG/DL (ref 0–1.2)
BILIRUB UR QL: NEGATIVE
BORDETELLA PARAPERTUSSIS BY PCR: NOT DETECTED
BUN SERPL-MCNC: 42 MG/DL (ref 8–23)
C PNEUM DNA SPEC QL NAA+PROBE: NOT DETECTED
CALCIUM SERPL-MCNC: 9.4 MG/DL (ref 8.8–10.2)
CASTS URNS QL MICRO: ABNORMAL /LPF
CHLORIDE SERPL-SCNC: 98 MMOL/L (ref 98–107)
CO2 SERPL-SCNC: 21 MMOL/L (ref 20–28)
COLOR UR: ABNORMAL
CREAT SERPL-MCNC: 1.95 MG/DL (ref 0.8–1.3)
DIFFERENTIAL METHOD BLD: ABNORMAL
EKG ATRIAL RATE: 108 BPM
EKG DIAGNOSIS: NORMAL
EKG P AXIS: 64 DEGREES
EKG P-R INTERVAL: 156 MS
EKG Q-T INTERVAL: 379 MS
EKG QRS DURATION: 126 MS
EKG QTC CALCULATION (BAZETT): 504 MS
EKG R AXIS: 77 DEGREES
EKG T AXIS: 12 DEGREES
EKG VENTRICULAR RATE: 106 BPM
EOSINOPHIL # BLD: 0 K/UL (ref 0–0.8)
EOSINOPHIL NFR BLD: 0 % (ref 0.5–7.8)
EPI CELLS #/AREA URNS HPF: ABNORMAL /HPF
ERYTHROCYTE [DISTWIDTH] IN BLOOD BY AUTOMATED COUNT: 13.4 % (ref 11.9–14.6)
FLUAV SUBTYP SPEC NAA+PROBE: NOT DETECTED
FLUBV RNA SPEC QL NAA+PROBE: NOT DETECTED
GLOBULIN SER CALC-MCNC: 3.2 G/DL (ref 2.3–3.5)
GLUCOSE BLD STRIP.AUTO-MCNC: 109 MG/DL (ref 65–100)
GLUCOSE BLD STRIP.AUTO-MCNC: 114 MG/DL (ref 65–100)
GLUCOSE SERPL-MCNC: 179 MG/DL (ref 70–99)
GLUCOSE UR STRIP.AUTO-MCNC: NEGATIVE MG/DL
HADV DNA SPEC QL NAA+PROBE: NOT DETECTED
HCOV 229E RNA SPEC QL NAA+PROBE: NOT DETECTED
HCOV HKU1 RNA SPEC QL NAA+PROBE: NOT DETECTED
HCOV NL63 RNA SPEC QL NAA+PROBE: NOT DETECTED
HCOV OC43 RNA SPEC QL NAA+PROBE: NOT DETECTED
HCT VFR BLD AUTO: 35.5 % (ref 41.1–50.3)
HGB BLD-MCNC: 11.4 G/DL (ref 13.6–17.2)
HGB UR QL STRIP: ABNORMAL
HMPV RNA SPEC QL NAA+PROBE: NOT DETECTED
HPIV1 RNA SPEC QL NAA+PROBE: NOT DETECTED
HPIV2 RNA SPEC QL NAA+PROBE: NOT DETECTED
HPIV3 RNA SPEC QL NAA+PROBE: NOT DETECTED
HPIV4 RNA SPEC QL NAA+PROBE: NOT DETECTED
IMM GRANULOCYTES # BLD AUTO: 0.1 K/UL (ref 0–0.5)
IMM GRANULOCYTES NFR BLD AUTO: 1 % (ref 0–5)
KETONES UR QL STRIP.AUTO: ABNORMAL MG/DL
LACTATE SERPL-SCNC: 2.6 MMOL/L (ref 0.5–2)
LACTATE SERPL-SCNC: 2.7 MMOL/L (ref 0.5–2)
LACTATE SERPL-SCNC: 2.9 MMOL/L (ref 0.5–2)
LACTATE SERPL-SCNC: 4.4 MMOL/L (ref 0.5–2)
LEUKOCYTE ESTERASE UR QL STRIP.AUTO: NEGATIVE
LIPASE SERPL-CCNC: 50 U/L (ref 13–60)
LYMPHOCYTES # BLD: 1.4 K/UL (ref 0.5–4.6)
LYMPHOCYTES NFR BLD: 8 % (ref 13–44)
M PNEUMO DNA SPEC QL NAA+PROBE: NOT DETECTED
MCH RBC QN AUTO: 32.2 PG (ref 26.1–32.9)
MCHC RBC AUTO-ENTMCNC: 32.1 G/DL (ref 31.4–35)
MCV RBC AUTO: 100.3 FL (ref 82–102)
MONOCYTES # BLD: 0.8 K/UL (ref 0.1–1.3)
MONOCYTES NFR BLD: 5 % (ref 4–12)
NEUTS SEG # BLD: 14.4 K/UL (ref 1.7–8.2)
NEUTS SEG NFR BLD: 86 % (ref 43–78)
NITRITE UR QL STRIP.AUTO: NEGATIVE
NRBC # BLD: 0 K/UL (ref 0–0.2)
OTHER OBSERVATIONS: ABNORMAL
PH UR STRIP: 5.5 (ref 5–9)
PLATELET # BLD AUTO: 238 K/UL (ref 150–450)
PMV BLD AUTO: 10.1 FL (ref 9.4–12.3)
POTASSIUM SERPL-SCNC: 3.7 MMOL/L (ref 3.5–5.1)
PROCALCITONIN SERPL-MCNC: 0.13 NG/ML (ref 0–0.1)
PROT SERPL-MCNC: 6.6 G/DL (ref 6.3–8.2)
PROT UR STRIP-MCNC: ABNORMAL MG/DL
RBC # BLD AUTO: 3.54 M/UL (ref 4.23–5.6)
RBC #/AREA URNS HPF: ABNORMAL /HPF
RSV RNA SPEC QL NAA+PROBE: NOT DETECTED
RV+EV RNA SPEC QL NAA+PROBE: NOT DETECTED
SARS-COV-2 RNA RESP QL NAA+PROBE: NOT DETECTED
SERVICE CMNT-IMP: ABNORMAL
SERVICE CMNT-IMP: ABNORMAL
SODIUM SERPL-SCNC: 143 MMOL/L (ref 136–145)
SP GR UR REFRACTOMETRY: 1.02 (ref 1–1.02)
UROBILINOGEN UR QL STRIP.AUTO: 1 EU/DL (ref 0.2–1)
WBC # BLD AUTO: 16.7 K/UL (ref 4.3–11.1)
WBC URNS QL MICRO: ABNORMAL /HPF

## 2024-10-15 PROCEDURE — 80053 COMPREHEN METABOLIC PANEL: CPT

## 2024-10-15 PROCEDURE — 82962 GLUCOSE BLOOD TEST: CPT

## 2024-10-15 PROCEDURE — 71045 X-RAY EXAM CHEST 1 VIEW: CPT

## 2024-10-15 PROCEDURE — 87086 URINE CULTURE/COLONY COUNT: CPT

## 2024-10-15 PROCEDURE — 99285 EMERGENCY DEPT VISIT HI MDM: CPT

## 2024-10-15 PROCEDURE — 96374 THER/PROPH/DIAG INJ IV PUSH: CPT

## 2024-10-15 PROCEDURE — 93010 ELECTROCARDIOGRAM REPORT: CPT | Performed by: INTERNAL MEDICINE

## 2024-10-15 PROCEDURE — 2580000003 HC RX 258: Performed by: EMERGENCY MEDICINE

## 2024-10-15 PROCEDURE — 6360000002 HC RX W HCPCS: Performed by: EMERGENCY MEDICINE

## 2024-10-15 PROCEDURE — 87205 SMEAR GRAM STAIN: CPT

## 2024-10-15 PROCEDURE — 97535 SELF CARE MNGMENT TRAINING: CPT

## 2024-10-15 PROCEDURE — 81001 URINALYSIS AUTO W/SCOPE: CPT

## 2024-10-15 PROCEDURE — 2580000003 HC RX 258

## 2024-10-15 PROCEDURE — 87040 BLOOD CULTURE FOR BACTERIA: CPT

## 2024-10-15 PROCEDURE — 0202U NFCT DS 22 TRGT SARS-COV-2: CPT

## 2024-10-15 PROCEDURE — 36415 COLL VENOUS BLD VENIPUNCTURE: CPT

## 2024-10-15 PROCEDURE — 96361 HYDRATE IV INFUSION ADD-ON: CPT

## 2024-10-15 PROCEDURE — 6360000002 HC RX W HCPCS

## 2024-10-15 PROCEDURE — 84145 PROCALCITONIN (PCT): CPT

## 2024-10-15 PROCEDURE — 1100000000 HC RM PRIVATE

## 2024-10-15 PROCEDURE — 83690 ASSAY OF LIPASE: CPT

## 2024-10-15 PROCEDURE — 6360000002 HC RX W HCPCS: Performed by: PHYSICIAN ASSISTANT

## 2024-10-15 PROCEDURE — 85025 COMPLETE CBC W/AUTO DIFF WBC: CPT

## 2024-10-15 PROCEDURE — 2500000003 HC RX 250 WO HCPCS: Performed by: EMERGENCY MEDICINE

## 2024-10-15 PROCEDURE — 74176 CT ABD & PELVIS W/O CONTRAST: CPT

## 2024-10-15 PROCEDURE — 97166 OT EVAL MOD COMPLEX 45 MIN: CPT

## 2024-10-15 PROCEDURE — 83605 ASSAY OF LACTIC ACID: CPT

## 2024-10-15 PROCEDURE — 87154 CUL TYP ID BLD PTHGN 6+ TRGT: CPT

## 2024-10-15 PROCEDURE — 6370000000 HC RX 637 (ALT 250 FOR IP)

## 2024-10-15 PROCEDURE — 93005 ELECTROCARDIOGRAM TRACING: CPT | Performed by: EMERGENCY MEDICINE

## 2024-10-15 RX ORDER — ACETAMINOPHEN 650 MG/1
650 SUPPOSITORY RECTAL EVERY 6 HOURS PRN
Status: DISCONTINUED | OUTPATIENT
Start: 2024-10-15 | End: 2024-11-05 | Stop reason: HOSPADM

## 2024-10-15 RX ORDER — POLYETHYLENE GLYCOL 3350 17 G/17G
17 POWDER, FOR SOLUTION ORAL DAILY PRN
Status: DISCONTINUED | OUTPATIENT
Start: 2024-10-15 | End: 2024-11-05 | Stop reason: HOSPADM

## 2024-10-15 RX ORDER — LANOLIN ALCOHOL/MO/W.PET/CERES
3 CREAM (GRAM) TOPICAL NIGHTLY PRN
Status: DISCONTINUED | OUTPATIENT
Start: 2024-10-15 | End: 2024-11-05 | Stop reason: HOSPADM

## 2024-10-15 RX ORDER — ONDANSETRON 4 MG/1
4 TABLET, ORALLY DISINTEGRATING ORAL EVERY 8 HOURS PRN
Status: DISCONTINUED | OUTPATIENT
Start: 2024-10-15 | End: 2024-11-05 | Stop reason: HOSPADM

## 2024-10-15 RX ORDER — POTASSIUM CHLORIDE 1500 MG/1
40 TABLET, EXTENDED RELEASE ORAL PRN
Status: DISCONTINUED | OUTPATIENT
Start: 2024-10-15 | End: 2024-11-05 | Stop reason: HOSPADM

## 2024-10-15 RX ORDER — ENOXAPARIN SODIUM 100 MG/ML
40 INJECTION SUBCUTANEOUS EVERY 24 HOURS
Status: DISCONTINUED | OUTPATIENT
Start: 2024-10-15 | End: 2024-10-16

## 2024-10-15 RX ORDER — BISACODYL 10 MG
10 SUPPOSITORY, RECTAL RECTAL DAILY PRN
Status: DISCONTINUED | OUTPATIENT
Start: 2024-10-15 | End: 2024-11-05 | Stop reason: HOSPADM

## 2024-10-15 RX ORDER — SODIUM CHLORIDE 0.9 % (FLUSH) 0.9 %
5-40 SYRINGE (ML) INJECTION PRN
Status: DISCONTINUED | OUTPATIENT
Start: 2024-10-15 | End: 2024-11-05 | Stop reason: HOSPADM

## 2024-10-15 RX ORDER — SODIUM CHLORIDE 9 MG/ML
INJECTION, SOLUTION INTRAVENOUS PRN
Status: DISCONTINUED | OUTPATIENT
Start: 2024-10-15 | End: 2024-10-16

## 2024-10-15 RX ORDER — POLYETHYLENE GLYCOL 3350 17 G/17G
17 POWDER, FOR SOLUTION ORAL DAILY
Status: DISCONTINUED | OUTPATIENT
Start: 2024-10-15 | End: 2024-10-17

## 2024-10-15 RX ORDER — DIPHENHYDRAMINE HYDROCHLORIDE 50 MG/ML
25 INJECTION INTRAMUSCULAR; INTRAVENOUS EVERY 6 HOURS PRN
Status: DISCONTINUED | OUTPATIENT
Start: 2024-10-15 | End: 2024-10-20

## 2024-10-15 RX ORDER — ACETAMINOPHEN 325 MG/1
650 TABLET ORAL EVERY 6 HOURS PRN
Status: DISCONTINUED | OUTPATIENT
Start: 2024-10-15 | End: 2024-11-05 | Stop reason: HOSPADM

## 2024-10-15 RX ORDER — POTASSIUM CHLORIDE 7.45 MG/ML
10 INJECTION INTRAVENOUS PRN
Status: DISCONTINUED | OUTPATIENT
Start: 2024-10-15 | End: 2024-11-05 | Stop reason: HOSPADM

## 2024-10-15 RX ORDER — ONDANSETRON 2 MG/ML
4 INJECTION INTRAMUSCULAR; INTRAVENOUS EVERY 6 HOURS PRN
Status: DISCONTINUED | OUTPATIENT
Start: 2024-10-15 | End: 2024-11-05 | Stop reason: HOSPADM

## 2024-10-15 RX ORDER — SODIUM CHLORIDE 0.9 % (FLUSH) 0.9 %
5-40 SYRINGE (ML) INJECTION EVERY 12 HOURS SCHEDULED
Status: DISCONTINUED | OUTPATIENT
Start: 2024-10-15 | End: 2024-10-17

## 2024-10-15 RX ORDER — SENNOSIDES A AND B 8.6 MG/1
1 TABLET, FILM COATED ORAL 2 TIMES DAILY
Status: DISCONTINUED | OUTPATIENT
Start: 2024-10-15 | End: 2024-10-28

## 2024-10-15 RX ORDER — 0.9 % SODIUM CHLORIDE 0.9 %
1000 INTRAVENOUS SOLUTION INTRAVENOUS
Status: COMPLETED | OUTPATIENT
Start: 2024-10-15 | End: 2024-10-15

## 2024-10-15 RX ORDER — MAGNESIUM SULFATE IN WATER 40 MG/ML
2000 INJECTION, SOLUTION INTRAVENOUS PRN
Status: DISCONTINUED | OUTPATIENT
Start: 2024-10-15 | End: 2024-11-05 | Stop reason: HOSPADM

## 2024-10-15 RX ADMIN — DIPHENHYDRAMINE HYDROCHLORIDE 25 MG: 50 INJECTION INTRAMUSCULAR; INTRAVENOUS at 21:36

## 2024-10-15 RX ADMIN — FAMOTIDINE 20 MG: 10 INJECTION, SOLUTION INTRAVENOUS at 05:56

## 2024-10-15 RX ADMIN — WATER 1000 MG: 1 INJECTION INTRAMUSCULAR; INTRAVENOUS; SUBCUTANEOUS at 08:45

## 2024-10-15 RX ADMIN — SODIUM CHLORIDE 1000 ML: 9 INJECTION, SOLUTION INTRAVENOUS at 03:33

## 2024-10-15 RX ADMIN — POLYETHYLENE GLYCOL 3350 17 G: 17 POWDER, FOR SOLUTION ORAL at 13:31

## 2024-10-15 RX ADMIN — SENNOSIDES 8.6 MG: 8.6 TABLET, FILM COATED ORAL at 13:31

## 2024-10-15 RX ADMIN — ENOXAPARIN SODIUM 40 MG: 100 INJECTION SUBCUTANEOUS at 08:45

## 2024-10-15 RX ADMIN — SENNOSIDES 8.6 MG: 8.6 TABLET, FILM COATED ORAL at 20:08

## 2024-10-15 RX ADMIN — SODIUM CHLORIDE 1000 ML: 9 INJECTION, SOLUTION INTRAVENOUS at 06:36

## 2024-10-15 RX ADMIN — SODIUM CHLORIDE, PRESERVATIVE FREE 10 ML: 5 INJECTION INTRAVENOUS at 20:08

## 2024-10-15 NOTE — ED TRIAGE NOTES
Pt arrives from home via [facility] where he was found in the floor. Per EMS pt had synco;al episode trying to get onto couch. Pt diagnosed with UTI on 10/4. Hypotensive with EMS.

## 2024-10-15 NOTE — ED PROVIDER NOTES
Place 0.4 mg under the tongue    POLYETHYLENE GLYCOL (GLYCOLAX) 17 GM/SCOOP POWDER    Take 17 g by mouth daily    RANITIDINE (ZANTAC) 150 MG TABLET    Take 150 mg by mouth 2 times daily    RIVAROXABAN (XARELTO) 15 MG TABS TABLET    Take 15 mg by mouth Daily with supper    TRAMADOL (ULTRAM) 50 MG TABLET    Take 50 mg by mouth every 6 hours as needed.        Results for orders placed or performed during the hospital encounter of 10/15/24   XR CHEST PORTABLE    Narrative    Clinical History/Indication for Exam:  Sepsis : Sepsis    CHEST     1    View   :  Comparison:   None    History : Chest pain/tightness    FINDINGS:  Trachea is midline.  Pulmonary vessels are not distended.    Mediastinum is not widened.  The heart is top normal in size.        Impression    1.  No focal lung consolidation.  No pleural effusion.        Report signed on 10/15/2024 (03:57 Eastern Time)  Signed by: Noah Landaverde M.D.  Reading Location:    Comprehensive Metabolic Panel   Result Value Ref Range    Sodium 143 136 - 145 mmol/L    Potassium 3.7 3.5 - 5.1 mmol/L    Chloride 98 98 - 107 mmol/L    CO2 21 20 - 28 mmol/L    Anion Gap 25 (H) 9 - 18 mmol/L    Glucose 179 (H) 70 - 99 mg/dL    BUN 42 (H) 8 - 23 MG/DL    Creatinine 1.95 (H) 0.80 - 1.30 MG/DL    Est, Glom Filt Rate 36 (L) >60 ml/min/1.73m2    Calcium 9.4 8.8 - 10.2 MG/DL    Total Bilirubin 0.7 0.0 - 1.2 MG/DL    ALT 9 8 - 55 U/L    AST 20 15 - 37 U/L    Alk Phosphatase 75 40 - 129 U/L    Total Protein 6.6 6.3 - 8.2 g/dL    Albumin 3.4 3.2 - 4.6 g/dL    Globulin 3.2 2.3 - 3.5 g/dL    Albumin/Globulin Ratio 1.0 1.0 - 1.9     CBC with Auto Differential   Result Value Ref Range    WBC 16.7 (H) 4.3 - 11.1 K/uL    RBC 3.54 (L) 4.23 - 5.6 M/uL    Hemoglobin 11.4 (L) 13.6 - 17.2 g/dL    Hematocrit 35.5 (L) 41.1 - 50.3 %    .3 82 - 102 FL    MCH 32.2 26.1 - 32.9 PG    MCHC 32.1 31.4 - 35.0 g/dL    RDW 13.4 11.9 - 14.6 %    Platelets 238 150 - 450 K/uL    MPV 10.1 9.4 - 12.3 FL

## 2024-10-15 NOTE — ED NOTES
Called phlebotomist to come draw lactic due to unable to obtain after several sticks     Elysia West, RN  10/15/24 5375

## 2024-10-15 NOTE — ED NOTES
TRANSFER - OUT REPORT:    Verbal report given to Jada ESCALANTE on Rashel Lane  being transferred to [facility] for routine progression of patient care       Report consisted of patient's Situation, Background, Assessment and   Recommendations(SBAR).     Information from the following report(s) Nurse Handoff Report, ED Encounter Summary, and ED SBAR was reviewed with the receiving nurse.    [place] Fall Assessment:    Presents to emergency department  because of falls (Syncope, seizure, or loss of consciousness): Yes  Age > 70: Yes  Altered Mental Status, Intoxication with alcohol or substance confusion (Disorientation, impaired judgment, poor safety awaremess, or inability to follow instructions): Yes  Impaired Mobility: Ambulates or transfers with assistive devices or assistance; Unable to ambulate or transer.: Yes  Nursing Judgement: Yes          Lines:   Peripheral IV 10/15/24 Proximal;Right Forearm (Active)        Opportunity for questions and clarification was provided.      Patient transported with:  Dayton Dixon RN  10/15/24 9643

## 2024-10-15 NOTE — ACP (ADVANCE CARE PLANNING)
Advance Care Planning     Advance Care Planning Activator (Inpatient)  Conversation Note      Date of ACP Conversation: 10/15/2024     Conversation Conducted with: Patient with Decision Making Capacity    ACP Activator: INDRA FORREST    {When Decision Maker makes decisions on behalf of the incapacitated patient: Decision Maker is asked to consider and make decisions based on patient values, known preferences, or best interests.     Health Care Decision Maker: No LW/HCPOA on file, patient aware legal next of kin remain decision maker until document provided.      Current Designated Health Care Decision Maker:     Click here to complete Healthcare Decision Makers including section of the Healthcare Decision Maker Relationship (ie \"Primary\")  Today we documented Decision Maker(s) consistent with Legal Next of Kin hierarchy.    Care Preferences Full code per MD order

## 2024-10-15 NOTE — CARE COORDINATION
Case Management Assessment  Initial Evaluation    Date/Time of Evaluation: 10/15/2024 10:06 AM  Assessment Completed by: INDRA FORREST    If patient is discharged prior to next notation, then this note serves as note for discharge by case management.    Patient Name: Rashel Lane                   YOB: 1950  Diagnosis: Sepsis (HCC) [A41.9]                   Date / Time: 10/15/2024  2:07 AM    Patient Admission Status: Inpatient   Readmission Risk (Low < 19, Mod (19-27), High > 27): Readmission Risk Score: 15.1    Current PCP: Mirta Walker APRN - NP  PCP verified by CM? (P) Yes    Chart Reviewed: Yes      History Provided by: Patient  Patient Orientation: Alert and Oriented    Patient Cognition: Alert    Hospitalization in the last 30 days (Readmission):  No    If yes, Readmission Assessment in  Navigator will be completed.    Advance Directives:      Code Status: Full Code   Patient's Primary Decision Maker is: Legal Next of Kin      Discharge Planning:    Patient lives with: (P) Alone Type of Home: (P) Other (Comment) (pending)  Primary Care Giver: Self  Patient Support Systems include: Children, Friends/Neighbors   Current Financial resources: (P) Medicare ([employer])  Current community resources: (P) None  Current services prior to admission: (P) None            Current DME:              Type of Home Care services:       ADLS  Prior functional level: (P) Independent in ADLs/IADLs  Current functional level: (P) Assistance with the following:    PT AM-PAC:   /24  OT AM-PAC:   /24    Family can provide assistance at DC: (P) Yes (and friend Makenzie)  Would you like Case Management to discuss the discharge plan with any other family members/significant others, and if so, who?    Plans to Return to Present Housing: (P) Unknown at present  Other Identified Issues/Barriers to RETURNING to current housing: pending   Potential Assistance needed at discharge:              Potential DME:

## 2024-10-16 ENCOUNTER — APPOINTMENT (OUTPATIENT)
Dept: CT IMAGING | Age: 74
DRG: 871 | End: 2024-10-16
Payer: MEDICARE

## 2024-10-16 ENCOUNTER — APPOINTMENT (OUTPATIENT)
Dept: GENERAL RADIOLOGY | Age: 74
DRG: 871 | End: 2024-10-16
Payer: MEDICARE

## 2024-10-16 PROBLEM — E86.0 DEHYDRATION: Status: ACTIVE | Noted: 2024-10-16

## 2024-10-16 PROBLEM — K92.2 GASTROINTESTINAL HEMORRHAGE: Status: ACTIVE | Noted: 2024-10-16

## 2024-10-16 PROBLEM — E44.0 MODERATE MALNUTRITION (HCC): Status: ACTIVE | Noted: 2024-10-16

## 2024-10-16 PROBLEM — R65.21 SEPTIC SHOCK (HCC): Status: ACTIVE | Noted: 2024-10-15

## 2024-10-16 LAB
ACB COMPLEX DNA BLD POS QL NAA+NON-PROBE: NOT DETECTED
ACCESSION NUMBER, LLC1M: ABNORMAL
ANION GAP BLD CALC-SCNC: ABNORMAL MMOL/L
ANION GAP SERPL CALC-SCNC: 20 MMOL/L (ref 9–18)
ARTERIAL PATENCY WRIST A: POSITIVE
B FRAGILIS DNA BLD POS QL NAA+NON-PROBE: NOT DETECTED
BACTERIA SPEC CULT: NORMAL
BASE DEFICIT BLD-SCNC: 13 MMOL/L
BASOPHILS # BLD: 0 K/UL (ref 0–0.2)
BASOPHILS NFR BLD: 0 % (ref 0–2)
BDY SITE: ABNORMAL
BIOFIRE TEST COMMENT: ABNORMAL
BUN SERPL-MCNC: 48 MG/DL (ref 8–23)
C ALBICANS DNA BLD POS QL NAA+NON-PROBE: NOT DETECTED
C AURIS DNA BLD POS QL NAA+NON-PROBE: NOT DETECTED
C GATTII+NEOFOR DNA BLD POS QL NAA+N-PRB: NOT DETECTED
C GLABRATA DNA BLD POS QL NAA+NON-PROBE: NOT DETECTED
C KRUSEI DNA BLD POS QL NAA+NON-PROBE: NOT DETECTED
C PARAP DNA BLD POS QL NAA+NON-PROBE: NOT DETECTED
C TROPICLS DNA BLD POS QL NAA+NON-PROBE: NOT DETECTED
CA-I BLD-MCNC: 1.15 MMOL/L (ref 1.12–1.32)
CALCIUM SERPL-MCNC: 8 MG/DL (ref 8.8–10.2)
CHLORIDE SERPL-SCNC: 108 MMOL/L (ref 98–107)
CO2 BLD-SCNC: 10 MMOL/L (ref 13–23)
CO2 SERPL-SCNC: 15 MMOL/L (ref 20–28)
CREAT SERPL-MCNC: 1.29 MG/DL (ref 0.8–1.3)
DIFFERENTIAL METHOD BLD: ABNORMAL
E CLOAC COMP DNA BLD POS NAA+NON-PROBE: NOT DETECTED
E COLI DNA BLD POS QL NAA+NON-PROBE: NOT DETECTED
E FAECALIS DNA BLD POS QL NAA+NON-PROBE: NOT DETECTED
E FAECIUM DNA BLD POS QL NAA+NON-PROBE: NOT DETECTED
ENTEROBACTERALES DNA BLD POS NAA+N-PRB: NOT DETECTED
EOSINOPHIL # BLD: 0 K/UL (ref 0–0.8)
EOSINOPHIL NFR BLD: 0 % (ref 0.5–7.8)
ERYTHROCYTE [DISTWIDTH] IN BLOOD BY AUTOMATED COUNT: 14.2 % (ref 11.9–14.6)
ERYTHROCYTE [DISTWIDTH] IN BLOOD BY AUTOMATED COUNT: 14.2 % (ref 11.9–14.6)
GAS FLOW.O2 O2 DELIVERY SYS: ABNORMAL
GLUCOSE BLD STRIP.AUTO-MCNC: 117 MG/DL (ref 65–100)
GLUCOSE BLD STRIP.AUTO-MCNC: 176 MG/DL (ref 65–100)
GLUCOSE BLD STRIP.AUTO-MCNC: 194 MG/DL (ref 65–100)
GLUCOSE BLD STRIP.AUTO-MCNC: 199 MG/DL (ref 65–100)
GLUCOSE SERPL-MCNC: 178 MG/DL (ref 70–99)
GP B STREP DNA BLD POS QL NAA+NON-PROBE: NOT DETECTED
HAEM INFLU DNA BLD POS QL NAA+NON-PROBE: NOT DETECTED
HCO3 BLD-SCNC: 10.8 MMOL/L (ref 22–26)
HCT VFR BLD AUTO: 17.4 % (ref 41.1–50.3)
HCT VFR BLD AUTO: 18.9 % (ref 41.1–50.3)
HGB BLD-MCNC: 5.4 G/DL (ref 13.6–17.2)
HGB BLD-MCNC: 5.8 G/DL (ref 13.6–17.2)
HISTORY CHECK: NORMAL
IMM GRANULOCYTES # BLD AUTO: 0.1 K/UL (ref 0–0.5)
IMM GRANULOCYTES NFR BLD AUTO: 1 % (ref 0–5)
K OXYTOCA DNA BLD POS QL NAA+NON-PROBE: NOT DETECTED
KLEBSIELLA SP DNA BLD POS QL NAA+NON-PRB: NOT DETECTED
KLEBSIELLA SP DNA BLD POS QL NAA+NON-PRB: NOT DETECTED
L MONOCYTOG DNA BLD POS QL NAA+NON-PROBE: NOT DETECTED
LACTATE SERPL-SCNC: 2.6 MMOL/L (ref 0.5–2)
LYMPHOCYTES # BLD: 1.8 K/UL (ref 0.5–4.6)
LYMPHOCYTES NFR BLD: 17 % (ref 13–44)
MCH RBC QN AUTO: 33.1 PG (ref 26.1–32.9)
MCH RBC QN AUTO: 33.1 PG (ref 26.1–32.9)
MCHC RBC AUTO-ENTMCNC: 30.7 G/DL (ref 31.4–35)
MCHC RBC AUTO-ENTMCNC: 31 G/DL (ref 31.4–35)
MCV RBC AUTO: 106.7 FL (ref 82–102)
MCV RBC AUTO: 108 FL (ref 82–102)
MECA+MECC ISLT/SPM QL: NOT DETECTED
MONOCYTES # BLD: 0.6 K/UL (ref 0.1–1.3)
MONOCYTES NFR BLD: 5 % (ref 4–12)
N MEN DNA BLD POS QL NAA+NON-PROBE: NOT DETECTED
NEUTS SEG # BLD: 8 K/UL (ref 1.7–8.2)
NEUTS SEG NFR BLD: 76 % (ref 43–78)
NRBC # BLD: 0 K/UL (ref 0–0.2)
NRBC # BLD: 0 K/UL (ref 0–0.2)
P AERUGINOSA DNA BLD POS NAA+NON-PROBE: NOT DETECTED
PCO2 BLD: 17.2 MMHG (ref 35–45)
PH BLD: 7.41 (ref 7.35–7.45)
PLATELET # BLD AUTO: 184 K/UL (ref 150–450)
PLATELET # BLD AUTO: 200 K/UL (ref 150–450)
PMV BLD AUTO: 10.2 FL (ref 9.4–12.3)
PMV BLD AUTO: 10.4 FL (ref 9.4–12.3)
PO2 BLD: 137 MMHG (ref 75–100)
POC FIO2: 3
POTASSIUM BLD-SCNC: 3.4 MMOL/L (ref 3.5–5.1)
POTASSIUM SERPL-SCNC: 3.8 MMOL/L (ref 3.5–5.1)
PROTEUS SP DNA BLD POS QL NAA+NON-PROBE: NOT DETECTED
RBC # BLD AUTO: 1.63 M/UL (ref 4.23–5.6)
RBC # BLD AUTO: 1.75 M/UL (ref 4.23–5.6)
RESISTANT GENE TARGETS: ABNORMAL
S AUREUS DNA BLD POS QL NAA+NON-PROBE: NOT DETECTED
S AUREUS+CONS DNA BLD POS NAA+NON-PROBE: DETECTED
S EPIDERMIDIS DNA BLD POS QL NAA+NON-PRB: DETECTED
S LUGDUNENSIS DNA BLD POS QL NAA+NON-PRB: NOT DETECTED
S MALTOPHILIA DNA BLD POS QL NAA+NON-PRB: NOT DETECTED
S MARCESCENS DNA BLD POS NAA+NON-PROBE: NOT DETECTED
S PNEUM DNA BLD POS QL NAA+NON-PROBE: NOT DETECTED
S PYO DNA BLD POS QL NAA+NON-PROBE: NOT DETECTED
SALMONELLA DNA BLD POS QL NAA+NON-PROBE: NOT DETECTED
SAO2 % BLD: 99 %
SERVICE CMNT-IMP: ABNORMAL
SERVICE CMNT-IMP: NORMAL
SODIUM BLD-SCNC: 143 MMOL/L (ref 136–145)
SODIUM SERPL-SCNC: 143 MMOL/L (ref 136–145)
SPECIMEN SITE: ABNORMAL
STREPTOCOCCUS DNA BLD POS NAA+NON-PROBE: NOT DETECTED
WBC # BLD AUTO: 10.5 K/UL (ref 4.3–11.1)
WBC # BLD AUTO: 13.2 K/UL (ref 4.3–11.1)

## 2024-10-16 PROCEDURE — 2580000003 HC RX 258: Performed by: INTERNAL MEDICINE

## 2024-10-16 PROCEDURE — 1100000000 HC RM PRIVATE

## 2024-10-16 PROCEDURE — 6370000000 HC RX 637 (ALT 250 FOR IP)

## 2024-10-16 PROCEDURE — 97530 THERAPEUTIC ACTIVITIES: CPT

## 2024-10-16 PROCEDURE — 2580000003 HC RX 258

## 2024-10-16 PROCEDURE — 36430 TRANSFUSION BLD/BLD COMPNT: CPT

## 2024-10-16 PROCEDURE — 6360000002 HC RX W HCPCS

## 2024-10-16 PROCEDURE — 86850 RBC ANTIBODY SCREEN: CPT

## 2024-10-16 PROCEDURE — 30233N1 TRANSFUSION OF NONAUTOLOGOUS RED BLOOD CELLS INTO PERIPHERAL VEIN, PERCUTANEOUS APPROACH: ICD-10-PCS | Performed by: INTERNAL MEDICINE

## 2024-10-16 PROCEDURE — 86923 COMPATIBILITY TEST ELECTRIC: CPT

## 2024-10-16 PROCEDURE — 86900 BLOOD TYPING SEROLOGIC ABO: CPT

## 2024-10-16 PROCEDURE — 84132 ASSAY OF SERUM POTASSIUM: CPT

## 2024-10-16 PROCEDURE — 2000000000 HC ICU R&B

## 2024-10-16 PROCEDURE — 86901 BLOOD TYPING SEROLOGIC RH(D): CPT

## 2024-10-16 PROCEDURE — 84295 ASSAY OF SERUM SODIUM: CPT

## 2024-10-16 PROCEDURE — 99223 1ST HOSP IP/OBS HIGH 75: CPT | Performed by: INTERNAL MEDICINE

## 2024-10-16 PROCEDURE — 36556 INSERT NON-TUNNEL CV CATH: CPT

## 2024-10-16 PROCEDURE — 6360000002 HC RX W HCPCS: Performed by: INTERNAL MEDICINE

## 2024-10-16 PROCEDURE — 80048 BASIC METABOLIC PNL TOTAL CA: CPT

## 2024-10-16 PROCEDURE — 70450 CT HEAD/BRAIN W/O DYE: CPT

## 2024-10-16 PROCEDURE — 85025 COMPLETE CBC W/AUTO DIFF WBC: CPT

## 2024-10-16 PROCEDURE — 71250 CT THORAX DX C-: CPT

## 2024-10-16 PROCEDURE — P9016 RBC LEUKOCYTES REDUCED: HCPCS

## 2024-10-16 PROCEDURE — 82803 BLOOD GASES ANY COMBINATION: CPT

## 2024-10-16 PROCEDURE — 36415 COLL VENOUS BLD VENIPUNCTURE: CPT

## 2024-10-16 PROCEDURE — 82330 ASSAY OF CALCIUM: CPT

## 2024-10-16 PROCEDURE — 85027 COMPLETE CBC AUTOMATED: CPT

## 2024-10-16 PROCEDURE — 71045 X-RAY EXAM CHEST 1 VIEW: CPT

## 2024-10-16 PROCEDURE — 87040 BLOOD CULTURE FOR BACTERIA: CPT

## 2024-10-16 PROCEDURE — 82962 GLUCOSE BLOOD TEST: CPT

## 2024-10-16 PROCEDURE — 05HM33Z INSERTION OF INFUSION DEVICE INTO RIGHT INTERNAL JUGULAR VEIN, PERCUTANEOUS APPROACH: ICD-10-PCS | Performed by: INTERNAL MEDICINE

## 2024-10-16 PROCEDURE — 76937 US GUIDE VASCULAR ACCESS: CPT

## 2024-10-16 PROCEDURE — 83605 ASSAY OF LACTIC ACID: CPT

## 2024-10-16 PROCEDURE — 2500000003 HC RX 250 WO HCPCS

## 2024-10-16 PROCEDURE — 82947 ASSAY GLUCOSE BLOOD QUANT: CPT

## 2024-10-16 RX ORDER — AMLODIPINE BESYLATE 5 MG/1
5 TABLET ORAL DAILY
Status: DISCONTINUED | OUTPATIENT
Start: 2024-10-16 | End: 2024-10-28

## 2024-10-16 RX ORDER — KETOCONAZOLE 20 MG/G
CREAM TOPICAL DAILY
Status: ON HOLD | COMMUNITY

## 2024-10-16 RX ORDER — AMLODIPINE BESYLATE 5 MG/1
1 TABLET ORAL DAILY
Status: ON HOLD | COMMUNITY

## 2024-10-16 RX ORDER — SODIUM CHLORIDE, SODIUM LACTATE, POTASSIUM CHLORIDE, AND CALCIUM CHLORIDE .6; .31; .03; .02 G/100ML; G/100ML; G/100ML; G/100ML
1000 INJECTION, SOLUTION INTRAVENOUS ONCE
Status: COMPLETED | OUTPATIENT
Start: 2024-10-16 | End: 2024-10-16

## 2024-10-16 RX ORDER — LISINOPRIL 5 MG/1
5 TABLET ORAL DAILY
Status: DISCONTINUED | OUTPATIENT
Start: 2024-10-16 | End: 2024-11-01

## 2024-10-16 RX ORDER — GABAPENTIN 400 MG/1
400 CAPSULE ORAL 3 TIMES DAILY
Status: ON HOLD | COMMUNITY
End: 2024-11-05 | Stop reason: HOSPADM

## 2024-10-16 RX ORDER — DEXMEDETOMIDINE HYDROCHLORIDE 4 UG/ML
.1-1.5 INJECTION, SOLUTION INTRAVENOUS CONTINUOUS
Status: DISCONTINUED | OUTPATIENT
Start: 2024-10-16 | End: 2024-10-17

## 2024-10-16 RX ORDER — LINEZOLID 2 MG/ML
600 INJECTION, SOLUTION INTRAVENOUS EVERY 12 HOURS
Status: DISCONTINUED | OUTPATIENT
Start: 2024-10-16 | End: 2024-10-22

## 2024-10-16 RX ORDER — ATORVASTATIN CALCIUM 40 MG/1
80 TABLET, FILM COATED ORAL DAILY
Status: DISCONTINUED | OUTPATIENT
Start: 2024-10-16 | End: 2024-10-28

## 2024-10-16 RX ORDER — METOPROLOL TARTRATE 50 MG
100 TABLET ORAL 2 TIMES DAILY
Status: DISCONTINUED | OUTPATIENT
Start: 2024-10-16 | End: 2024-10-28

## 2024-10-16 RX ORDER — SODIUM CHLORIDE, SODIUM LACTATE, POTASSIUM CHLORIDE, CALCIUM CHLORIDE 600; 310; 30; 20 MG/100ML; MG/100ML; MG/100ML; MG/100ML
INJECTION, SOLUTION INTRAVENOUS CONTINUOUS
Status: DISCONTINUED | OUTPATIENT
Start: 2024-10-16 | End: 2024-10-17

## 2024-10-16 RX ORDER — CLOPIDOGREL BISULFATE 75 MG/1
75 TABLET ORAL DAILY
Status: DISCONTINUED | OUTPATIENT
Start: 2024-10-16 | End: 2024-11-05 | Stop reason: HOSPADM

## 2024-10-16 RX ORDER — NAPROXEN 250 MG/1
250 TABLET ORAL 2 TIMES DAILY PRN
Status: ON HOLD | COMMUNITY

## 2024-10-16 RX ORDER — SODIUM CHLORIDE 9 MG/ML
INJECTION, SOLUTION INTRAVENOUS PRN
Status: DISCONTINUED | OUTPATIENT
Start: 2024-10-16 | End: 2024-10-30

## 2024-10-16 RX ORDER — LISINOPRIL 5 MG/1
1 TABLET ORAL DAILY
Status: ON HOLD | COMMUNITY
End: 2024-11-05 | Stop reason: HOSPADM

## 2024-10-16 RX ADMIN — SODIUM CHLORIDE, PRESERVATIVE FREE 10 ML: 5 INJECTION INTRAVENOUS at 20:53

## 2024-10-16 RX ADMIN — ENOXAPARIN SODIUM 40 MG: 100 INJECTION SUBCUTANEOUS at 08:58

## 2024-10-16 RX ADMIN — DEXMEDETOMIDINE HYDROCHLORIDE 0.2 MCG/KG/HR: 400 INJECTION INTRAVENOUS at 21:40

## 2024-10-16 RX ADMIN — SODIUM CHLORIDE, POTASSIUM CHLORIDE, SODIUM LACTATE AND CALCIUM CHLORIDE: 600; 310; 30; 20 INJECTION, SOLUTION INTRAVENOUS at 20:52

## 2024-10-16 RX ADMIN — LINEZOLID 600 MG: 600 INJECTION, SOLUTION INTRAVENOUS at 17:38

## 2024-10-16 RX ADMIN — SODIUM CHLORIDE, POTASSIUM CHLORIDE, SODIUM LACTATE AND CALCIUM CHLORIDE: 600; 310; 30; 20 INJECTION, SOLUTION INTRAVENOUS at 17:40

## 2024-10-16 RX ADMIN — SODIUM CHLORIDE, PRESERVATIVE FREE 40 MG: 5 INJECTION INTRAVENOUS at 21:10

## 2024-10-16 RX ADMIN — SENNOSIDES 8.6 MG: 8.6 TABLET, FILM COATED ORAL at 08:48

## 2024-10-16 RX ADMIN — SODIUM CHLORIDE, POTASSIUM CHLORIDE, SODIUM LACTATE AND CALCIUM CHLORIDE 1000 ML: 600; 310; 30; 20 INJECTION, SOLUTION INTRAVENOUS at 17:00

## 2024-10-16 ASSESSMENT — PAIN SCALES - GENERAL
PAINLEVEL_OUTOF10: 0
PAINLEVEL_OUTOF10: 0

## 2024-10-16 NOTE — CARE COORDINATION
CM left a list of snfs at bedside for daughter to look over and choose 3. CM spoke to her and she plans on coming in tomorrow. CM following.

## 2024-10-16 NOTE — SIGNIFICANT EVENT
Hospitalist Rapid Response or Critical Care Event   Admit Date:  10/15/2024  2:07 AM   Name:  Rashel Lane   Age:  74 y.o.  Sex:  male  :  1950   MRN:  [id number]   Room:  [facility]/    Presenting Complaint: Loss of Consciousness    Reason(s) for Admission: Dehydration [E86.0]  Septicemia (HCC) [A41.9]  Acute kidney injury (HCC) [N17.9]  Fall, initial encounter [W19.XXXA]  Sepsis (HCC) [A41.9]     Rapid Response or Critical Care Event:   Approximately 1745 Code S was called due to unresponsiveness of patient. Margie BRINK, was already in the room when I arrived. She was assessing Mr. Lane and he became more unresponsive, had BL upper arm weakness as well as left lower leg weakness and Code S was called and Stat headCT ordered. He is here with Septic Shock due to MSSE bacteremia. Margie BRINK did get check out from Dr. Martin that Mr. Lane had become more lethargic and orders were placed for sepsis work-up and IVF bolus. Vancomycin was also added. Please see his full note. Labs returned with Hgb 5.4 from 11.4 this morning. As we continued to assess, vital signs showed hypoxia, tachycardia, and hypotension. As he continued to deteriorate quickly, Rapid Response was called. IV had infiltrated this evening and IV access was gained again and IVF bolus started. He was also placed on oxygen and stat ABG was ordered. Hypoxia improved and blood pressure did start to increase with bolus, but he remain mostly unresponsive though was protecting his airway. Intensivist, Dr. Farr, was quickly at the bedside to assess Mr. Lane. Stat chest/abd/pel CT was placed along with headCT as well as stat HH recheck, blood transfusion order, and orders to transfer to ICU. Family was called several times with no answer.      There is a high probability of acute organ impairment or life-threatening deterioration in the patient's condition from:   Septic Shock  Acute GI Bleed    Critical care and other interventions:   As above    Total

## 2024-10-16 NOTE — ICUWATCH
Rapid Response Team Note      Subjective: Responded to Rapid Response secondary to AMS.    Summary: Was in the room during my assessment during a nurse concern when he became less responsive. Hospitalist called Code S, and then Pulmonology arrived. Patient on 3L NC. Alert to repeated stimuli. Ordered stat CT chest/abd/pelvis.      See Rapid Response/Code Blue Narrator for full documentation    Outcome: Patient to remain in current location. Will follow-up per Rapid Response Team Clinical Rounding protocol.      Nikita Frye RN  [facility]: [phone]  [facility]: [phone]

## 2024-10-17 ENCOUNTER — ANESTHESIA (OUTPATIENT)
Dept: ENDOSCOPY | Age: 74
End: 2024-10-17
Payer: MEDICARE

## 2024-10-17 ENCOUNTER — ANESTHESIA EVENT (OUTPATIENT)
Dept: ENDOSCOPY | Age: 74
End: 2024-10-17
Payer: MEDICARE

## 2024-10-17 PROBLEM — A41.9 SEVERE SEPSIS (HCC): Status: RESOLVED | Noted: 2024-10-17 | Resolved: 2024-10-17

## 2024-10-17 PROBLEM — G93.40 ACUTE ENCEPHALOPATHY: Status: ACTIVE | Noted: 2024-10-17

## 2024-10-17 PROBLEM — R65.20 SEVERE SEPSIS (HCC): Status: ACTIVE | Noted: 2024-10-17

## 2024-10-17 PROBLEM — A41.9 SEVERE SEPSIS (HCC): Status: ACTIVE | Noted: 2024-10-17

## 2024-10-17 PROBLEM — R57.8 HEMORRHAGIC SHOCK (HCC): Status: ACTIVE | Noted: 2024-10-17

## 2024-10-17 PROBLEM — R65.20 SEVERE SEPSIS (HCC): Status: RESOLVED | Noted: 2024-10-17 | Resolved: 2024-10-17

## 2024-10-17 PROBLEM — D62 ACUTE BLOOD LOSS ANEMIA: Status: ACTIVE | Noted: 2024-10-17

## 2024-10-17 PROBLEM — N17.9 AKI (ACUTE KIDNEY INJURY) (HCC): Status: ACTIVE | Noted: 2024-10-17

## 2024-10-17 LAB
ANION GAP SERPL CALC-SCNC: 16 MMOL/L (ref 9–18)
BACTERIA SPEC CULT: ABNORMAL
BACTERIA SPEC CULT: ABNORMAL
BASOPHILS # BLD: 0 K/UL (ref 0–0.2)
BASOPHILS NFR BLD: 0 % (ref 0–2)
BUN SERPL-MCNC: 58 MG/DL (ref 8–23)
CALCIUM SERPL-MCNC: 8.1 MG/DL (ref 8.8–10.2)
CHLORIDE SERPL-SCNC: 111 MMOL/L (ref 98–107)
CO2 SERPL-SCNC: 15 MMOL/L (ref 20–28)
CREAT SERPL-MCNC: 1.79 MG/DL (ref 0.8–1.3)
DIFFERENTIAL METHOD BLD: ABNORMAL
EOSINOPHIL # BLD: 0 K/UL (ref 0–0.8)
EOSINOPHIL NFR BLD: 0 % (ref 0.5–7.8)
ERYTHROCYTE [DISTWIDTH] IN BLOOD BY AUTOMATED COUNT: 15.9 % (ref 11.9–14.6)
GLUCOSE BLD STRIP.AUTO-MCNC: 92 MG/DL (ref 65–100)
GLUCOSE SERPL-MCNC: 128 MG/DL (ref 70–99)
GRAM STN SPEC: ABNORMAL
HCT VFR BLD AUTO: 29.8 % (ref 41.1–50.3)
HCT VFR BLD AUTO: 30.2 % (ref 41.1–50.3)
HCT VFR BLD AUTO: 33.6 % (ref 41.1–50.3)
HGB BLD-MCNC: 10 G/DL (ref 13.6–17.2)
HGB BLD-MCNC: 11.2 G/DL (ref 13.6–17.2)
HGB BLD-MCNC: 9.8 G/DL (ref 13.6–17.2)
HISTORY CHECK: NORMAL
IMM GRANULOCYTES # BLD AUTO: 0.1 K/UL (ref 0–0.5)
IMM GRANULOCYTES NFR BLD AUTO: 1 % (ref 0–5)
LACTATE SERPL-SCNC: 2.5 MMOL/L (ref 0.5–2)
LACTATE SERPL-SCNC: 3.5 MMOL/L (ref 0.5–2)
LACTATE SERPL-SCNC: 4.7 MMOL/L (ref 0.5–2)
LYMPHOCYTES # BLD: 1.2 K/UL (ref 0.5–4.6)
LYMPHOCYTES NFR BLD: 9 % (ref 13–44)
MCH RBC QN AUTO: 29.9 PG (ref 26.1–32.9)
MCHC RBC AUTO-ENTMCNC: 32.9 G/DL (ref 31.4–35)
MCV RBC AUTO: 90.9 FL (ref 82–102)
MONOCYTES # BLD: 0.6 K/UL (ref 0.1–1.3)
MONOCYTES NFR BLD: 4 % (ref 4–12)
MRSA DNA SPEC QL NAA+PROBE: NOT DETECTED
NEUTS SEG # BLD: 11.3 K/UL (ref 1.7–8.2)
NEUTS SEG NFR BLD: 86 % (ref 43–78)
NRBC # BLD: 0 K/UL (ref 0–0.2)
PLATELET # BLD AUTO: 121 K/UL (ref 150–450)
PMV BLD AUTO: 9.4 FL (ref 9.4–12.3)
POTASSIUM SERPL-SCNC: 4.8 MMOL/L (ref 3.5–5.1)
RBC # BLD AUTO: 3.28 M/UL (ref 4.23–5.6)
S AUREUS CPE NOSE QL NAA+PROBE: NOT DETECTED
SERVICE CMNT-IMP: ABNORMAL
SERVICE CMNT-IMP: NORMAL
SODIUM SERPL-SCNC: 142 MMOL/L (ref 136–145)
WBC # BLD AUTO: 13.2 K/UL (ref 4.3–11.1)

## 2024-10-17 PROCEDURE — 83605 ASSAY OF LACTIC ACID: CPT

## 2024-10-17 PROCEDURE — 2580000003 HC RX 258: Performed by: INTERNAL MEDICINE

## 2024-10-17 PROCEDURE — 6360000002 HC RX W HCPCS

## 2024-10-17 PROCEDURE — C1751 CATH, INF, PER/CENT/MIDLINE: HCPCS

## 2024-10-17 PROCEDURE — 6360000002 HC RX W HCPCS: Performed by: INTERNAL MEDICINE

## 2024-10-17 PROCEDURE — 2580000003 HC RX 258: Performed by: ANESTHESIOLOGY

## 2024-10-17 PROCEDURE — 99291 CRITICAL CARE FIRST HOUR: CPT | Performed by: INTERNAL MEDICINE

## 2024-10-17 PROCEDURE — 6370000000 HC RX 637 (ALT 250 FOR IP)

## 2024-10-17 PROCEDURE — 2709999900 HC NON-CHARGEABLE SUPPLY: Performed by: INTERNAL MEDICINE

## 2024-10-17 PROCEDURE — 1100000000 HC RM PRIVATE

## 2024-10-17 PROCEDURE — 85025 COMPLETE CBC W/AUTO DIFF WBC: CPT

## 2024-10-17 PROCEDURE — 36415 COLL VENOUS BLD VENIPUNCTURE: CPT

## 2024-10-17 PROCEDURE — 2500000003 HC RX 250 WO HCPCS

## 2024-10-17 PROCEDURE — 2720000010 HC SURG SUPPLY STERILE: Performed by: INTERNAL MEDICINE

## 2024-10-17 PROCEDURE — 85018 HEMOGLOBIN: CPT

## 2024-10-17 PROCEDURE — 82962 GLUCOSE BLOOD TEST: CPT

## 2024-10-17 PROCEDURE — 3609017100 HC EGD: Performed by: INTERNAL MEDICINE

## 2024-10-17 PROCEDURE — 36430 TRANSFUSION BLD/BLD COMPNT: CPT

## 2024-10-17 PROCEDURE — 2580000003 HC RX 258

## 2024-10-17 PROCEDURE — 3E0G8GC INTRODUCTION OF OTHER THERAPEUTIC SUBSTANCE INTO UPPER GI, VIA NATURAL OR ARTIFICIAL OPENING ENDOSCOPIC: ICD-10-PCS | Performed by: INTERNAL MEDICINE

## 2024-10-17 PROCEDURE — 87641 MR-STAPH DNA AMP PROBE: CPT

## 2024-10-17 PROCEDURE — 3700000001 HC ADD 15 MINUTES (ANESTHESIA): Performed by: INTERNAL MEDICINE

## 2024-10-17 PROCEDURE — P9016 RBC LEUKOCYTES REDUCED: HCPCS

## 2024-10-17 PROCEDURE — 3700000000 HC ANESTHESIA ATTENDED CARE: Performed by: INTERNAL MEDICINE

## 2024-10-17 PROCEDURE — 85014 HEMATOCRIT: CPT

## 2024-10-17 PROCEDURE — 99222 1ST HOSP IP/OBS MODERATE 55: CPT | Performed by: INTERNAL MEDICINE

## 2024-10-17 PROCEDURE — 80048 BASIC METABOLIC PNL TOTAL CA: CPT

## 2024-10-17 RX ORDER — PHENYLEPHRINE HYDROCHLORIDE 10 MG/ML
INJECTION INTRAVENOUS
Status: DISCONTINUED | OUTPATIENT
Start: 2024-10-17 | End: 2024-10-17 | Stop reason: SDUPTHER

## 2024-10-17 RX ORDER — EPINEPHRINE 0.1 MG/ML
INJECTION INTRAVENOUS
Status: DISPENSED
Start: 2024-10-17 | End: 2024-10-17

## 2024-10-17 RX ORDER — SODIUM CHLORIDE, SODIUM LACTATE, POTASSIUM CHLORIDE, CALCIUM CHLORIDE 600; 310; 30; 20 MG/100ML; MG/100ML; MG/100ML; MG/100ML
INJECTION, SOLUTION INTRAVENOUS CONTINUOUS
Status: DISCONTINUED | OUTPATIENT
Start: 2024-10-17 | End: 2024-10-17

## 2024-10-17 RX ORDER — LIDOCAINE HYDROCHLORIDE 20 MG/ML
INJECTION, SOLUTION EPIDURAL; INFILTRATION; INTRACAUDAL; PERINEURAL
Status: DISCONTINUED | OUTPATIENT
Start: 2024-10-17 | End: 2024-10-17 | Stop reason: SDUPTHER

## 2024-10-17 RX ORDER — DEXTROSE MONOHYDRATE 100 MG/ML
INJECTION, SOLUTION INTRAVENOUS CONTINUOUS PRN
Status: DISCONTINUED | OUTPATIENT
Start: 2024-10-17 | End: 2024-11-05 | Stop reason: HOSPADM

## 2024-10-17 RX ORDER — SODIUM CHLORIDE, SODIUM LACTATE, POTASSIUM CHLORIDE, CALCIUM CHLORIDE 600; 310; 30; 20 MG/100ML; MG/100ML; MG/100ML; MG/100ML
INJECTION, SOLUTION INTRAVENOUS CONTINUOUS
Status: CANCELLED | OUTPATIENT
Start: 2024-10-17

## 2024-10-17 RX ORDER — SODIUM CHLORIDE 0.9 % (FLUSH) 0.9 %
5-40 SYRINGE (ML) INJECTION EVERY 12 HOURS SCHEDULED
Status: DISCONTINUED | OUTPATIENT
Start: 2024-10-17 | End: 2024-11-05 | Stop reason: HOSPADM

## 2024-10-17 RX ORDER — SODIUM CHLORIDE 0.9 % (FLUSH) 0.9 %
5-40 SYRINGE (ML) INJECTION PRN
Status: CANCELLED | OUTPATIENT
Start: 2024-10-17

## 2024-10-17 RX ORDER — NALOXONE HYDROCHLORIDE 0.4 MG/ML
INJECTION, SOLUTION INTRAMUSCULAR; INTRAVENOUS; SUBCUTANEOUS PRN
Status: DISCONTINUED | OUTPATIENT
Start: 2024-10-17 | End: 2024-11-05 | Stop reason: HOSPADM

## 2024-10-17 RX ORDER — PROPOFOL 10 MG/ML
INJECTION, EMULSION INTRAVENOUS
Status: DISCONTINUED | OUTPATIENT
Start: 2024-10-17 | End: 2024-10-17 | Stop reason: SDUPTHER

## 2024-10-17 RX ORDER — EPINEPHRINE IN SOD CHLOR,ISO 1 MG/10 ML
SYRINGE (ML) INTRAVENOUS PRN
Status: DISCONTINUED | OUTPATIENT
Start: 2024-10-17 | End: 2024-10-17 | Stop reason: ALTCHOICE

## 2024-10-17 RX ORDER — SODIUM CHLORIDE 0.9 % (FLUSH) 0.9 %
5-40 SYRINGE (ML) INJECTION PRN
Status: DISCONTINUED | OUTPATIENT
Start: 2024-10-17 | End: 2024-11-05 | Stop reason: HOSPADM

## 2024-10-17 RX ORDER — SODIUM CHLORIDE 9 MG/ML
INJECTION, SOLUTION INTRAVENOUS PRN
Status: CANCELLED | OUTPATIENT
Start: 2024-10-17

## 2024-10-17 RX ORDER — SODIUM CHLORIDE 0.9 % (FLUSH) 0.9 %
5-40 SYRINGE (ML) INJECTION EVERY 12 HOURS SCHEDULED
Status: CANCELLED | OUTPATIENT
Start: 2024-10-17

## 2024-10-17 RX ORDER — SODIUM CHLORIDE, SODIUM LACTATE, POTASSIUM CHLORIDE, CALCIUM CHLORIDE 600; 310; 30; 20 MG/100ML; MG/100ML; MG/100ML; MG/100ML
INJECTION, SOLUTION INTRAVENOUS
Status: DISCONTINUED | OUTPATIENT
Start: 2024-10-17 | End: 2024-10-17 | Stop reason: SDUPTHER

## 2024-10-17 RX ORDER — SODIUM CHLORIDE 9 MG/ML
INJECTION, SOLUTION INTRAVENOUS PRN
Status: DISCONTINUED | OUTPATIENT
Start: 2024-10-17 | End: 2024-11-05 | Stop reason: HOSPADM

## 2024-10-17 RX ORDER — IBUPROFEN 600 MG/1
1 TABLET ORAL PRN
Status: DISCONTINUED | OUTPATIENT
Start: 2024-10-17 | End: 2024-11-05 | Stop reason: HOSPADM

## 2024-10-17 RX ORDER — ONDANSETRON 2 MG/ML
4 INJECTION INTRAMUSCULAR; INTRAVENOUS
Status: ACTIVE | OUTPATIENT
Start: 2024-10-17 | End: 2024-10-18

## 2024-10-17 RX ORDER — LIDOCAINE HYDROCHLORIDE 10 MG/ML
1 INJECTION, SOLUTION INFILTRATION; PERINEURAL
Status: CANCELLED | OUTPATIENT
Start: 2024-10-17 | End: 2024-10-18

## 2024-10-17 RX ADMIN — PROPOFOL 20 MG: 10 INJECTION, EMULSION INTRAVENOUS at 10:45

## 2024-10-17 RX ADMIN — PROPOFOL 20 MG: 10 INJECTION, EMULSION INTRAVENOUS at 10:32

## 2024-10-17 RX ADMIN — METOPROLOL TARTRATE 100 MG: 100 TABLET, FILM COATED ORAL at 20:37

## 2024-10-17 RX ADMIN — PROPOFOL 20 MG: 10 INJECTION, EMULSION INTRAVENOUS at 10:51

## 2024-10-17 RX ADMIN — LINEZOLID 600 MG: 600 INJECTION, SOLUTION INTRAVENOUS at 05:03

## 2024-10-17 RX ADMIN — PROPOFOL 10 MG: 10 INJECTION, EMULSION INTRAVENOUS at 10:39

## 2024-10-17 RX ADMIN — PHENYLEPHRINE HYDROCHLORIDE 150 MCG: 10 INJECTION INTRAVENOUS at 10:37

## 2024-10-17 RX ADMIN — PROPOFOL 30 MG: 10 INJECTION, EMULSION INTRAVENOUS at 10:56

## 2024-10-17 RX ADMIN — WATER 1000 MG: 1 INJECTION INTRAMUSCULAR; INTRAVENOUS; SUBCUTANEOUS at 09:12

## 2024-10-17 RX ADMIN — GABAPENTIN 400 MG: 300 CAPSULE ORAL at 20:37

## 2024-10-17 RX ADMIN — SODIUM CHLORIDE, PRESERVATIVE FREE 40 MG: 5 INJECTION INTRAVENOUS at 20:00

## 2024-10-17 RX ADMIN — SODIUM CHLORIDE, SODIUM LACTATE, POTASSIUM CHLORIDE, AND CALCIUM CHLORIDE: 600; 310; 30; 20 INJECTION, SOLUTION INTRAVENOUS at 10:24

## 2024-10-17 RX ADMIN — PROPOFOL 20 MG: 10 INJECTION, EMULSION INTRAVENOUS at 10:48

## 2024-10-17 RX ADMIN — PROPOFOL 20 MG: 10 INJECTION, EMULSION INTRAVENOUS at 10:42

## 2024-10-17 RX ADMIN — PROPOFOL 10 MG: 10 INJECTION, EMULSION INTRAVENOUS at 10:34

## 2024-10-17 RX ADMIN — PROPOFOL 30 MG: 10 INJECTION, EMULSION INTRAVENOUS at 10:54

## 2024-10-17 RX ADMIN — SENNOSIDES 8.6 MG: 8.6 TABLET, FILM COATED ORAL at 20:37

## 2024-10-17 RX ADMIN — LINEZOLID 600 MG: 600 INJECTION, SOLUTION INTRAVENOUS at 18:06

## 2024-10-17 RX ADMIN — SODIUM CHLORIDE, PRESERVATIVE FREE 40 MG: 5 INJECTION INTRAVENOUS at 09:12

## 2024-10-17 RX ADMIN — LIDOCAINE HYDROCHLORIDE 50 MG: 20 INJECTION, SOLUTION EPIDURAL; INFILTRATION; INTRACAUDAL; PERINEURAL at 10:32

## 2024-10-17 RX ADMIN — SODIUM CHLORIDE, PRESERVATIVE FREE 10 ML: 5 INJECTION INTRAVENOUS at 09:13

## 2024-10-17 RX ADMIN — PROPOFOL 20 MG: 10 INJECTION, EMULSION INTRAVENOUS at 10:37

## 2024-10-17 RX ADMIN — SODIUM CHLORIDE, POTASSIUM CHLORIDE, SODIUM LACTATE AND CALCIUM CHLORIDE: 600; 310; 30; 20 INJECTION, SOLUTION INTRAVENOUS at 06:01

## 2024-10-17 RX ADMIN — SODIUM CHLORIDE, PRESERVATIVE FREE 10 ML: 5 INJECTION INTRAVENOUS at 20:37

## 2024-10-17 ASSESSMENT — LIFESTYLE VARIABLES: SMOKING_STATUS: 1

## 2024-10-17 NOTE — ANESTHESIA POSTPROCEDURE EVALUATION
Department of Anesthesiology  Postprocedure Note    Patient: Rashel Lane  MRN: [id number]  YOB: 1950  Date of evaluation: 10/17/2024    Procedure Summary       Date: 10/17/24 Room / Location: [facility] ENDO 05 / [facility] ENDOSCOPY    Anesthesia Start: 1024 Anesthesia Stop: 1110    Procedure: ESOPHAGOGASTRODUODENOSCOPY / GOLD PROBE / EPI (Upper GI Region) Diagnosis:       Melena      Anemia due to other cause, not classified      (Melena [K92.1])      (Anemia due to other cause, not classified [D64.89])    Surgeons: Devin August DO Responsible Provider: Osiris Alan MD    Anesthesia Type: TIVA ASA Status: 4 - Emergent            Anesthesia Type: TIVA    Segundo Phase I:      Segundo Phase II:      Anesthesia Post Evaluation    Patient location during evaluation: ICU  Patient participation: complete - patient cannot participate  Post-procedure mental status: Pre-anesthetic mental status.  Airway patency: patent  Nausea: Acceptable.  Cardiovascular status: blood pressure returned to baseline  Respiratory status: acceptable  Hydration status: stable    No notable events documented.

## 2024-10-17 NOTE — INTERDISCIPLINARY ROUNDS
Multi-D Rounds/Checklist (leapfrog):  Lines: can any be removed?: None    External Urinary Catheter (Active)     CVC  10/16/24 Right Internal jugular (Active)     DVT Prophylaxis: Contraindicated  GI bleed  Vent: N/A  Nutrition Ordered/appropriate: Contraindicated- for procedure, GI bleeding  Can antibiotics or other drugs be stopped? No End date set - yes  Inpat Anti-Infectives (From admission, onward)       Start     Ordered Stop    10/16/24 1715  linezolid (ZYVOX) IVPB 600 mg  600 mg,   IntraVENous,   EVERY 12 HOURS         10/16/24 1703 10/23/24 1729    10/16/24 0900  cefTRIAXone (ROCEPHIN) 1,000 mg in sterile water 10 mL IV syringe  1,000 mg,   IntraVENous,   EVERY 24 HOURS         10/15/24 1019 10/22/24 0859                  Consults needed: None  A: Is pain control adequate? (has PRNs? Stop drip?) Yes  B: Sedation break and SBT? N/A  C: Is sedation choice appropriate? N/A  D: Delirium/CAM-ICU? No  E: Mobility goals/appropriateness? Yes  F: Family update and plan?  Daughter is surrogate decision maker and is being updated daily by primary attending and nursing staff.    Yary Conteh, APRN - CNP

## 2024-10-17 NOTE — CONSULTS
Assessment/Plan  Mr. Rashel Lane is a 74 y.o. male who had a significant drop in his hemoglobin with melanotic stools.  After transfusion his vital signs are stable as well as his hemoglobin.  Patient is not alert so we will need to obtain consent for upper endoscopy from his POA.  Continue IV Protonix 40 mg twice a day.  Monitor hemoglobin and transfuse for hemoglobin less than 7.  He does have a history of alcohol use but there is no evidence of cirrhosis on imaging.      MEDs:     Current Facility-Administered Medications   Medication Dose Route Frequency Provider Last Rate Last Admin    amLODIPine (NORVASC) tablet 5 mg  5 mg Oral Daily CiescoDanis DO        atorvastatin (LIPITOR) tablet 80 mg  80 mg Oral Daily CiescoDanis, DO        [Held by provider] clopidogrel (PLAVIX) tablet 75 mg  75 mg Oral Daily CireynacoDanis, DO        [Held by provider] gabapentin (NEURONTIN) capsule 400 mg  400 mg Oral TID CiesDanis bauer, DO        [Held by provider] lisinopril (PRINIVIL;ZESTRIL) tablet 5 mg  5 mg Oral Daily CiDanis nash DO        [Held by provider] metoprolol (LOPRESSOR) tablet 100 mg  100 mg Oral BID CiescoDanis, DO        [Held by provider] rivaroxaban (XARELTO) tablet 15 mg  15 mg Oral Dinner CiDanis nash, DO        lactated ringers IV soln infusion   IntraVENous Continuous CiDanis nash  mL/hr at 10/17/24 0601 New Bag at 10/17/24 0601    linezolid (ZYVOX) IVPB 600 mg  600 mg IntraVENous Q12H CiDanis nash DO   Stopped at 10/17/24 0601    0.9 % sodium chloride infusion   IntraVENous PRN Karina Vuong DO        pantoprazole (PROTONIX) 40 mg in sodium chloride (PF) 0.9 % 10 mL injection  40 mg IntraVENous Q12H Amos Farr MD   40 mg at 10/16/24 2110    VASOpressin (VASOSTRICT) 20 units in dextrose 5% 100 mL infusion  0.01-0.03 Units/min IntraVENous Continuous Cooper West MD   Held at 10/16/24 2112    dexmedeTOMIDine (PRECEDEX) 400 mcg in sodium chloride 0.9 % 100 mL infusion

## 2024-10-17 NOTE — CARE COORDINATION
Chart reviewed and pt discussed in am IDR s/p tx to ICU from 8th floor for hypotension and GIB. Precedex gtt and NC 3L O2 currently. D/C dispo will be STR and daughter has list for deciison. CM will continue to follow for DOUGLAS needs/POC. LOS 2 days.

## 2024-10-18 PROBLEM — A41.9 SEPTICEMIA (HCC): Status: ACTIVE | Noted: 2024-10-18

## 2024-10-18 LAB
ABO + RH BLD: NORMAL
ANION GAP SERPL CALC-SCNC: 7 MMOL/L (ref 9–18)
BASOPHILS # BLD: 0 K/UL (ref 0–0.2)
BASOPHILS NFR BLD: 0 % (ref 0–2)
BLD PROD TYP BPU: NORMAL
BLOOD BANK BLOOD PRODUCT EXPIRATION DATE: NORMAL
BLOOD BANK DISPENSE STATUS: NORMAL
BLOOD BANK ISBT PRODUCT BLOOD TYPE: 7300
BLOOD BANK PRODUCT CODE: NORMAL
BLOOD BANK PRODUCT CODE: NORMAL
BLOOD BANK UNIT TYPE AND RH: NORMAL
BLOOD GROUP ANTIBODIES SERPL: NORMAL
BPU ID: NORMAL
BUN SERPL-MCNC: 38 MG/DL (ref 8–23)
CALCIUM SERPL-MCNC: 8.2 MG/DL (ref 8.8–10.2)
CHLORIDE SERPL-SCNC: 111 MMOL/L (ref 98–107)
CO2 SERPL-SCNC: 22 MMOL/L (ref 20–28)
CREAT SERPL-MCNC: 1.18 MG/DL (ref 0.8–1.3)
CROSSMATCH RESULT: NORMAL
DIFFERENTIAL METHOD BLD: ABNORMAL
EOSINOPHIL # BLD: 0 K/UL (ref 0–0.8)
EOSINOPHIL NFR BLD: 0 % (ref 0.5–7.8)
ERYTHROCYTE [DISTWIDTH] IN BLOOD BY AUTOMATED COUNT: 17.4 % (ref 11.9–14.6)
GLUCOSE BLD STRIP.AUTO-MCNC: 73 MG/DL (ref 65–100)
GLUCOSE BLD STRIP.AUTO-MCNC: 81 MG/DL (ref 65–100)
GLUCOSE BLD STRIP.AUTO-MCNC: 84 MG/DL (ref 65–100)
GLUCOSE BLD STRIP.AUTO-MCNC: 86 MG/DL (ref 65–100)
GLUCOSE SERPL-MCNC: 93 MG/DL (ref 70–99)
HCT VFR BLD AUTO: 25.3 % (ref 41.1–50.3)
HCT VFR BLD AUTO: 28 % (ref 41.1–50.3)
HCT VFR BLD AUTO: 28.1 % (ref 41.1–50.3)
HCT VFR BLD AUTO: 33.5 % (ref 41.1–50.3)
HGB BLD-MCNC: 11.3 G/DL (ref 13.6–17.2)
HGB BLD-MCNC: 8.3 G/DL (ref 13.6–17.2)
HGB BLD-MCNC: 9.2 G/DL (ref 13.6–17.2)
HGB BLD-MCNC: 9.4 G/DL (ref 13.6–17.2)
IMM GRANULOCYTES # BLD AUTO: 0.1 K/UL (ref 0–0.5)
IMM GRANULOCYTES NFR BLD AUTO: 1 % (ref 0–5)
LACTATE SERPL-SCNC: 1.6 MMOL/L (ref 0.5–2)
LACTATE SERPL-SCNC: 2.5 MMOL/L (ref 0.5–2)
LACTATE SERPL-SCNC: 2.7 MMOL/L (ref 0.5–2)
LACTATE SERPL-SCNC: 3.2 MMOL/L (ref 0.5–2)
LYMPHOCYTES # BLD: 1.5 K/UL (ref 0.5–4.6)
LYMPHOCYTES NFR BLD: 12 % (ref 13–44)
MCH RBC QN AUTO: 30.1 PG (ref 26.1–32.9)
MCHC RBC AUTO-ENTMCNC: 32.8 G/DL (ref 31.4–35)
MCV RBC AUTO: 91.7 FL (ref 82–102)
MONOCYTES # BLD: 0.6 K/UL (ref 0.1–1.3)
MONOCYTES NFR BLD: 5 % (ref 4–12)
NEUTS SEG # BLD: 10.5 K/UL (ref 1.7–8.2)
NEUTS SEG NFR BLD: 82 % (ref 43–78)
NRBC # BLD: 0 K/UL (ref 0–0.2)
PLATELET # BLD AUTO: 123 K/UL (ref 150–450)
PMV BLD AUTO: 10.3 FL (ref 9.4–12.3)
POTASSIUM SERPL-SCNC: 3.9 MMOL/L (ref 3.5–5.1)
RBC # BLD AUTO: 2.76 M/UL (ref 4.23–5.6)
SERVICE CMNT-IMP: NORMAL
SODIUM SERPL-SCNC: 139 MMOL/L (ref 136–145)
SPECIMEN EXP DATE BLD: NORMAL
UNIT DIVISION: 0
UNIT ISSUE DATE/TIME: NORMAL
WBC # BLD AUTO: 12.8 K/UL (ref 4.3–11.1)

## 2024-10-18 PROCEDURE — 6360000002 HC RX W HCPCS: Performed by: FAMILY MEDICINE

## 2024-10-18 PROCEDURE — 2580000003 HC RX 258: Performed by: FAMILY MEDICINE

## 2024-10-18 PROCEDURE — 80048 BASIC METABOLIC PNL TOTAL CA: CPT

## 2024-10-18 PROCEDURE — 99232 SBSQ HOSP IP/OBS MODERATE 35: CPT | Performed by: INTERNAL MEDICINE

## 2024-10-18 PROCEDURE — 36415 COLL VENOUS BLD VENIPUNCTURE: CPT

## 2024-10-18 PROCEDURE — 1100000000 HC RM PRIVATE

## 2024-10-18 PROCEDURE — 2580000003 HC RX 258: Performed by: INTERNAL MEDICINE

## 2024-10-18 PROCEDURE — 2580000003 HC RX 258

## 2024-10-18 PROCEDURE — 6360000002 HC RX W HCPCS: Performed by: INTERNAL MEDICINE

## 2024-10-18 PROCEDURE — 6360000002 HC RX W HCPCS

## 2024-10-18 PROCEDURE — 82962 GLUCOSE BLOOD TEST: CPT

## 2024-10-18 PROCEDURE — 85014 HEMATOCRIT: CPT

## 2024-10-18 PROCEDURE — 2580000003 HC RX 258: Performed by: ANESTHESIOLOGY

## 2024-10-18 PROCEDURE — 83605 ASSAY OF LACTIC ACID: CPT

## 2024-10-18 PROCEDURE — 85018 HEMOGLOBIN: CPT

## 2024-10-18 PROCEDURE — 6370000000 HC RX 637 (ALT 250 FOR IP)

## 2024-10-18 PROCEDURE — 85025 COMPLETE CBC W/AUTO DIFF WBC: CPT

## 2024-10-18 RX ORDER — OLANZAPINE 5 MG/1
5 TABLET, ORALLY DISINTEGRATING ORAL 2 TIMES DAILY PRN
Status: DISCONTINUED | OUTPATIENT
Start: 2024-10-18 | End: 2024-11-05 | Stop reason: HOSPADM

## 2024-10-18 RX ADMIN — SODIUM CHLORIDE, PRESERVATIVE FREE 10 ML: 5 INJECTION INTRAVENOUS at 21:00

## 2024-10-18 RX ADMIN — LINEZOLID 600 MG: 600 INJECTION, SOLUTION INTRAVENOUS at 05:05

## 2024-10-18 RX ADMIN — WATER 5 MG: 1 INJECTION INTRAMUSCULAR; INTRAVENOUS; SUBCUTANEOUS at 08:00

## 2024-10-18 RX ADMIN — SODIUM CHLORIDE, PRESERVATIVE FREE 40 MG: 5 INJECTION INTRAVENOUS at 21:06

## 2024-10-18 RX ADMIN — WATER 5 MG: 1 INJECTION INTRAMUSCULAR; INTRAVENOUS; SUBCUTANEOUS at 20:42

## 2024-10-18 RX ADMIN — METOPROLOL TARTRATE 100 MG: 100 TABLET, FILM COATED ORAL at 20:42

## 2024-10-18 RX ADMIN — WATER 5 MG: 1 INJECTION INTRAMUSCULAR; INTRAVENOUS; SUBCUTANEOUS at 03:12

## 2024-10-18 RX ADMIN — LINEZOLID 600 MG: 600 INJECTION, SOLUTION INTRAVENOUS at 17:20

## 2024-10-18 RX ADMIN — WATER 1000 MG: 1 INJECTION INTRAMUSCULAR; INTRAVENOUS; SUBCUTANEOUS at 09:31

## 2024-10-18 RX ADMIN — SODIUM CHLORIDE, PRESERVATIVE FREE 40 MG: 5 INJECTION INTRAVENOUS at 09:32

## 2024-10-18 RX ADMIN — GABAPENTIN 400 MG: 300 CAPSULE ORAL at 20:42

## 2024-10-18 RX ADMIN — SODIUM CHLORIDE, PRESERVATIVE FREE 10 ML: 5 INJECTION INTRAVENOUS at 09:45

## 2024-10-18 RX ADMIN — SODIUM CHLORIDE, PRESERVATIVE FREE 10 ML: 5 INJECTION INTRAVENOUS at 21:01

## 2024-10-18 NOTE — CONSULTS
Comprehensive Nutrition Assessment    Type and Reason for Visit: Reassess, Consult  Malnutrition Screening Tool: Malnutrition Screen  Have you recently lost weight without trying?: 2 to 13 pounds (1 point)  Have you been eating poorly because of a decreased appetite?: Yes (1 point)  Malnutrition Screening Tool Score: 2  General Nutrition Management: Hospitalist    Nutrition Recommendations/Plan:   Meals and Snacks:  Diet: Continue current order  Oral Nutriton Supplement Therapy:   Medical food supplement therapy:  Resume Ensure Enlive (high calorie high protein) 350 calories, 20 grams protein per 8 ounce serving   If pt is alert for oral intake, encourage ensure first for nutrient density.    Pt is currently day 3 of admission without adequate oral intake If pt remains too lethargic to consume oral intake over the next 48 hours, nutrition support should be considered for primary needs.  Potentially would require parenteral nutrition in the setting of esophagitis.       Malnutrition Assessment:  Malnutrition Status: Moderate malnutrition  Context: Chronic Illness  Findings of clinical characteristics of malnutrition:   Energy Intake:  Unable to assess  Weight Loss:  Greater than 10% over 6 months (14.6% in 4 months)     Body Fat Loss:  No significant body fat loss     Muscle Mass Loss:  Mild muscle mass loss Temples (temporalis), Clavicles (pectoralis & deltoids), Calf (gastrocnemius), Hand (interosseous)  Fluid Accumulation:  No significant fluid accumulation     Strength:  Normal  strength     Nutrition Assessment:  Nutrition History: Unable to provide history.      Do You Have Any Cultural, Quaker, or Ethnic Food Preferences?: No   Weight History: 11/21/23 143#, 3/12/24 140#, 6/18/24 142#  Nutrition Background:       PMH significant for HTN DM, HLD, poor compliance. Presented s/p fall.   Admitted with sepsis, constipation, medical noncompliance.   Admitted with hemorrhagic shock, GI hemorrhage, chronic

## 2024-10-18 NOTE — ICUWATCH
RRT Clinical Rounding Nurse Progress Report      SUBJECTIVE: Patient assessed secondary to transfer from critical care.      Vitals:    10/17/24 2115 10/17/24 2226 10/18/24 0309 10/18/24 0711   BP:  112/80 125/82 (!) 103/56   Pulse: 78 73 69    Resp: 17 18 16 18   Temp:  98.2 °F (36.8 °C) 97.7 °F (36.5 °C) 97.7 °F (36.5 °C)   TempSrc:  Oral Oral    SpO2: 100% 99% 97% 95%   Weight:       Height:            ASSESSMENT:  Patient currently sleeping. Received 2 doses of zyprexa this morning due to agitation/restlessness. Respirations unlabored. Moving all extremities spontaneously. Vs, labs, and progress notes reviewed.    PLAN:  Will follow per RRT Clinical Rounding Program protocol.    Ion Conn RN  [facility]: [phone]  [facility]: [phone]

## 2024-10-18 NOTE — ICUWATCH
RRT Clinical Rounding Nurse Update    Vitals:    10/17/24 2115 10/17/24 2226 10/18/24 0309 10/18/24 0711   BP:  112/80 125/82 (!) 103/56   Pulse: 78 73 69    Resp: 17 18 16 18   Temp:  98.2 °F (36.8 °C) 97.7 °F (36.5 °C) 97.7 °F (36.5 °C)   TempSrc:  Oral Oral    SpO2: 100% 99% 97% 95%   Weight:       Height:            ASSESSMENT:  Previous outreach assessment was reviewed. There have been no significant changes since previous assessment.     PLAN:  Will discharge from RRT Clinical Rounding Program per protocol. Please call if needed.    Ion Conn RN  [facility]: [phone]  [facility]: [phone]

## 2024-10-18 NOTE — CARE COORDINATION
Chart reviewed and patient discussed in IDT rounds this AM. Short term rehab was recommended 10/16. New therapy orders placed since patient is off the unit. Patient was agitated over night and received Zyprexa. Patient resting in bed. Previous CM gave STR list to daughter to review. Awaiting family selection.    Albina AYALA, ACM  [place]

## 2024-10-19 PROBLEM — R41.0: Status: ACTIVE | Noted: 2024-10-19

## 2024-10-19 PROCEDURE — 6360000002 HC RX W HCPCS

## 2024-10-19 PROCEDURE — 2580000003 HC RX 258: Performed by: ANESTHESIOLOGY

## 2024-10-19 PROCEDURE — 97161 PT EVAL LOW COMPLEX 20 MIN: CPT

## 2024-10-19 PROCEDURE — 6370000000 HC RX 637 (ALT 250 FOR IP)

## 2024-10-19 PROCEDURE — 97535 SELF CARE MNGMENT TRAINING: CPT

## 2024-10-19 PROCEDURE — 2580000003 HC RX 258: Performed by: FAMILY MEDICINE

## 2024-10-19 PROCEDURE — 1100000000 HC RM PRIVATE

## 2024-10-19 PROCEDURE — 6360000002 HC RX W HCPCS: Performed by: FAMILY MEDICINE

## 2024-10-19 PROCEDURE — 76937 US GUIDE VASCULAR ACCESS: CPT

## 2024-10-19 PROCEDURE — 6360000002 HC RX W HCPCS: Performed by: INTERNAL MEDICINE

## 2024-10-19 PROCEDURE — 97530 THERAPEUTIC ACTIVITIES: CPT

## 2024-10-19 PROCEDURE — 6370000000 HC RX 637 (ALT 250 FOR IP): Performed by: INTERNAL MEDICINE

## 2024-10-19 PROCEDURE — 2580000003 HC RX 258: Performed by: INTERNAL MEDICINE

## 2024-10-19 PROCEDURE — 2580000003 HC RX 258

## 2024-10-19 PROCEDURE — 97168 OT RE-EVAL EST PLAN CARE: CPT

## 2024-10-19 RX ADMIN — SENNOSIDES 8.6 MG: 8.6 TABLET, FILM COATED ORAL at 21:03

## 2024-10-19 RX ADMIN — GABAPENTIN 400 MG: 300 CAPSULE ORAL at 21:03

## 2024-10-19 RX ADMIN — SENNOSIDES 8.6 MG: 8.6 TABLET, FILM COATED ORAL at 10:27

## 2024-10-19 RX ADMIN — ZIPRASIDONE MESYLATE 10 MG: 20 INJECTION, POWDER, LYOPHILIZED, FOR SOLUTION INTRAMUSCULAR at 02:05

## 2024-10-19 RX ADMIN — AMLODIPINE BESYLATE 5 MG: 5 TABLET ORAL at 10:27

## 2024-10-19 RX ADMIN — METOPROLOL TARTRATE 100 MG: 100 TABLET, FILM COATED ORAL at 10:27

## 2024-10-19 RX ADMIN — GABAPENTIN 400 MG: 300 CAPSULE ORAL at 13:55

## 2024-10-19 RX ADMIN — METOPROLOL TARTRATE 100 MG: 100 TABLET, FILM COATED ORAL at 21:04

## 2024-10-19 RX ADMIN — SODIUM CHLORIDE, PRESERVATIVE FREE 40 MG: 5 INJECTION INTRAVENOUS at 10:26

## 2024-10-19 RX ADMIN — OLANZAPINE 5 MG: 5 TABLET, ORALLY DISINTEGRATING ORAL at 00:03

## 2024-10-19 RX ADMIN — LINEZOLID 600 MG: 600 INJECTION, SOLUTION INTRAVENOUS at 17:40

## 2024-10-19 RX ADMIN — GABAPENTIN 400 MG: 300 CAPSULE ORAL at 10:26

## 2024-10-19 RX ADMIN — SODIUM CHLORIDE, PRESERVATIVE FREE 10 ML: 5 INJECTION INTRAVENOUS at 10:27

## 2024-10-19 RX ADMIN — WATER 5 MG: 1 INJECTION INTRAMUSCULAR; INTRAVENOUS; SUBCUTANEOUS at 12:24

## 2024-10-19 RX ADMIN — WATER 1000 MG: 1 INJECTION INTRAMUSCULAR; INTRAVENOUS; SUBCUTANEOUS at 10:26

## 2024-10-19 RX ADMIN — LINEZOLID 600 MG: 600 INJECTION, SOLUTION INTRAVENOUS at 05:07

## 2024-10-19 RX ADMIN — ATORVASTATIN CALCIUM 80 MG: 40 TABLET, FILM COATED ORAL at 10:27

## 2024-10-19 RX ADMIN — SODIUM CHLORIDE, PRESERVATIVE FREE 40 MG: 5 INJECTION INTRAVENOUS at 21:04

## 2024-10-19 RX ADMIN — SODIUM CHLORIDE, PRESERVATIVE FREE 10 ML: 5 INJECTION INTRAVENOUS at 20:55

## 2024-10-19 ASSESSMENT — PAIN SCALES - GENERAL: PAINLEVEL_OUTOF10: 0

## 2024-10-20 LAB
BACTERIA SPEC CULT: NORMAL
SERVICE CMNT-IMP: NORMAL

## 2024-10-20 PROCEDURE — 6360000002 HC RX W HCPCS: Performed by: INTERNAL MEDICINE

## 2024-10-20 PROCEDURE — 2580000003 HC RX 258: Performed by: ANESTHESIOLOGY

## 2024-10-20 PROCEDURE — 1100000000 HC RM PRIVATE

## 2024-10-20 PROCEDURE — 6370000000 HC RX 637 (ALT 250 FOR IP)

## 2024-10-20 PROCEDURE — 6370000000 HC RX 637 (ALT 250 FOR IP): Performed by: INTERNAL MEDICINE

## 2024-10-20 PROCEDURE — 2580000003 HC RX 258: Performed by: INTERNAL MEDICINE

## 2024-10-20 PROCEDURE — 6370000000 HC RX 637 (ALT 250 FOR IP): Performed by: PHYSICIAN ASSISTANT

## 2024-10-20 RX ORDER — QUETIAPINE FUMARATE 25 MG/1
25 TABLET, FILM COATED ORAL NIGHTLY
Status: DISCONTINUED | OUTPATIENT
Start: 2024-10-20 | End: 2024-10-30

## 2024-10-20 RX ADMIN — GABAPENTIN 400 MG: 300 CAPSULE ORAL at 08:52

## 2024-10-20 RX ADMIN — SENNOSIDES 8.6 MG: 8.6 TABLET, FILM COATED ORAL at 21:11

## 2024-10-20 RX ADMIN — ATORVASTATIN CALCIUM 80 MG: 40 TABLET, FILM COATED ORAL at 08:52

## 2024-10-20 RX ADMIN — AMLODIPINE BESYLATE 5 MG: 5 TABLET ORAL at 08:52

## 2024-10-20 RX ADMIN — Medication 3 MG: at 23:39

## 2024-10-20 RX ADMIN — SODIUM CHLORIDE, PRESERVATIVE FREE 10 ML: 5 INJECTION INTRAVENOUS at 08:53

## 2024-10-20 RX ADMIN — QUETIAPINE FUMARATE 25 MG: 25 TABLET ORAL at 21:11

## 2024-10-20 RX ADMIN — LINEZOLID 600 MG: 600 INJECTION, SOLUTION INTRAVENOUS at 04:56

## 2024-10-20 RX ADMIN — GABAPENTIN 400 MG: 300 CAPSULE ORAL at 14:42

## 2024-10-20 RX ADMIN — SODIUM CHLORIDE, PRESERVATIVE FREE 10 ML: 5 INJECTION INTRAVENOUS at 21:12

## 2024-10-20 RX ADMIN — SENNOSIDES 8.6 MG: 8.6 TABLET, FILM COATED ORAL at 08:52

## 2024-10-20 RX ADMIN — SODIUM CHLORIDE, PRESERVATIVE FREE 40 MG: 5 INJECTION INTRAVENOUS at 08:52

## 2024-10-20 RX ADMIN — METOPROLOL TARTRATE 100 MG: 100 TABLET, FILM COATED ORAL at 08:52

## 2024-10-20 RX ADMIN — OLANZAPINE 5 MG: 5 TABLET, ORALLY DISINTEGRATING ORAL at 23:39

## 2024-10-20 RX ADMIN — GABAPENTIN 400 MG: 300 CAPSULE ORAL at 21:10

## 2024-10-20 RX ADMIN — WATER 1000 MG: 1 INJECTION INTRAMUSCULAR; INTRAVENOUS; SUBCUTANEOUS at 08:52

## 2024-10-20 RX ADMIN — SODIUM CHLORIDE, PRESERVATIVE FREE 40 MG: 5 INJECTION INTRAVENOUS at 21:11

## 2024-10-20 RX ADMIN — LINEZOLID 600 MG: 600 INJECTION, SOLUTION INTRAVENOUS at 17:16

## 2024-10-21 LAB
ANION GAP SERPL CALC-SCNC: 9 MMOL/L (ref 9–18)
BASOPHILS # BLD: 0 K/UL (ref 0–0.2)
BASOPHILS NFR BLD: 0 % (ref 0–2)
BUN SERPL-MCNC: 19 MG/DL (ref 8–23)
CALCIUM SERPL-MCNC: 8.2 MG/DL (ref 8.8–10.2)
CHLORIDE SERPL-SCNC: 108 MMOL/L (ref 98–107)
CO2 SERPL-SCNC: 23 MMOL/L (ref 20–28)
CREAT SERPL-MCNC: 1.24 MG/DL (ref 0.8–1.3)
DIFFERENTIAL METHOD BLD: ABNORMAL
EOSINOPHIL # BLD: 0.1 K/UL (ref 0–0.8)
EOSINOPHIL NFR BLD: 1 % (ref 0.5–7.8)
ERYTHROCYTE [DISTWIDTH] IN BLOOD BY AUTOMATED COUNT: 17.2 % (ref 11.9–14.6)
FOLATE SERPL-MCNC: 8 NG/ML (ref 3.1–17.5)
GLUCOSE SERPL-MCNC: 113 MG/DL (ref 70–99)
HCT VFR BLD AUTO: 31.8 % (ref 41.1–50.3)
HGB BLD-MCNC: 10.4 G/DL (ref 13.6–17.2)
IMM GRANULOCYTES # BLD AUTO: 0.1 K/UL (ref 0–0.5)
IMM GRANULOCYTES NFR BLD AUTO: 1 % (ref 0–5)
LYMPHOCYTES # BLD: 1.4 K/UL (ref 0.5–4.6)
LYMPHOCYTES NFR BLD: 17 % (ref 13–44)
MCH RBC QN AUTO: 31.2 PG (ref 26.1–32.9)
MCHC RBC AUTO-ENTMCNC: 32.7 G/DL (ref 31.4–35)
MCV RBC AUTO: 95.5 FL (ref 82–102)
MONOCYTES # BLD: 0.7 K/UL (ref 0.1–1.3)
MONOCYTES NFR BLD: 9 % (ref 4–12)
NEUTS SEG # BLD: 5.7 K/UL (ref 1.7–8.2)
NEUTS SEG NFR BLD: 72 % (ref 43–78)
NRBC # BLD: 0 K/UL (ref 0–0.2)
PLATELET # BLD AUTO: 155 K/UL (ref 150–450)
PMV BLD AUTO: 9.8 FL (ref 9.4–12.3)
POTASSIUM SERPL-SCNC: 3.9 MMOL/L (ref 3.5–5.1)
RBC # BLD AUTO: 3.33 M/UL (ref 4.23–5.6)
SODIUM SERPL-SCNC: 141 MMOL/L (ref 136–145)
VIT B12 SERPL-MCNC: 619 PG/ML (ref 193–986)
WBC # BLD AUTO: 8 K/UL (ref 4.3–11.1)

## 2024-10-21 PROCEDURE — 36415 COLL VENOUS BLD VENIPUNCTURE: CPT

## 2024-10-21 PROCEDURE — 2580000003 HC RX 258

## 2024-10-21 PROCEDURE — 6370000000 HC RX 637 (ALT 250 FOR IP)

## 2024-10-21 PROCEDURE — 6360000002 HC RX W HCPCS

## 2024-10-21 PROCEDURE — 80048 BASIC METABOLIC PNL TOTAL CA: CPT

## 2024-10-21 PROCEDURE — 2580000003 HC RX 258: Performed by: ANESTHESIOLOGY

## 2024-10-21 PROCEDURE — 76937 US GUIDE VASCULAR ACCESS: CPT

## 2024-10-21 PROCEDURE — 82607 VITAMIN B-12: CPT

## 2024-10-21 PROCEDURE — 2580000003 HC RX 258: Performed by: INTERNAL MEDICINE

## 2024-10-21 PROCEDURE — 6360000002 HC RX W HCPCS: Performed by: INTERNAL MEDICINE

## 2024-10-21 PROCEDURE — 6370000000 HC RX 637 (ALT 250 FOR IP): Performed by: INTERNAL MEDICINE

## 2024-10-21 PROCEDURE — 1100000000 HC RM PRIVATE

## 2024-10-21 PROCEDURE — 85025 COMPLETE CBC W/AUTO DIFF WBC: CPT

## 2024-10-21 PROCEDURE — 82746 ASSAY OF FOLIC ACID SERUM: CPT

## 2024-10-21 PROCEDURE — 6370000000 HC RX 637 (ALT 250 FOR IP): Performed by: PHYSICIAN ASSISTANT

## 2024-10-21 RX ADMIN — SODIUM CHLORIDE, PRESERVATIVE FREE 10 ML: 5 INJECTION INTRAVENOUS at 09:03

## 2024-10-21 RX ADMIN — QUETIAPINE FUMARATE 25 MG: 25 TABLET ORAL at 20:22

## 2024-10-21 RX ADMIN — AMLODIPINE BESYLATE 5 MG: 5 TABLET ORAL at 09:02

## 2024-10-21 RX ADMIN — SENNOSIDES 8.6 MG: 8.6 TABLET, FILM COATED ORAL at 09:02

## 2024-10-21 RX ADMIN — SENNOSIDES 8.6 MG: 8.6 TABLET, FILM COATED ORAL at 20:25

## 2024-10-21 RX ADMIN — GABAPENTIN 400 MG: 300 CAPSULE ORAL at 20:24

## 2024-10-21 RX ADMIN — METOPROLOL TARTRATE 100 MG: 100 TABLET, FILM COATED ORAL at 09:02

## 2024-10-21 RX ADMIN — SODIUM CHLORIDE, PRESERVATIVE FREE 40 MG: 5 INJECTION INTRAVENOUS at 09:02

## 2024-10-21 RX ADMIN — WATER 1000 MG: 1 INJECTION INTRAMUSCULAR; INTRAVENOUS; SUBCUTANEOUS at 09:03

## 2024-10-21 RX ADMIN — WATER 5 MG: 1 INJECTION INTRAMUSCULAR; INTRAVENOUS; SUBCUTANEOUS at 23:41

## 2024-10-21 RX ADMIN — GABAPENTIN 400 MG: 300 CAPSULE ORAL at 09:02

## 2024-10-21 RX ADMIN — Medication 3 MG: at 20:22

## 2024-10-21 RX ADMIN — OLANZAPINE 5 MG: 5 TABLET, ORALLY DISINTEGRATING ORAL at 21:20

## 2024-10-21 RX ADMIN — LINEZOLID 600 MG: 600 INJECTION, SOLUTION INTRAVENOUS at 06:01

## 2024-10-21 RX ADMIN — ATORVASTATIN CALCIUM 80 MG: 40 TABLET, FILM COATED ORAL at 09:01

## 2024-10-21 ASSESSMENT — PAIN SCALES - GENERAL: PAINLEVEL_OUTOF10: 0

## 2024-10-21 NOTE — CARE COORDINATION
A list of facilities in network with patient insurance left in room for daughter to review. CM also text pictures of the list to daughter. Awaiting family selections.    Albina HOUSER, ACM  [facility] [place]

## 2024-10-22 ENCOUNTER — APPOINTMENT (OUTPATIENT)
Dept: ULTRASOUND IMAGING | Age: 74
DRG: 871 | End: 2024-10-22
Payer: MEDICARE

## 2024-10-22 LAB
ANION GAP SERPL CALC-SCNC: 5 MMOL/L (ref 9–18)
BACTERIA SPEC CULT: NORMAL
BACTERIA SPEC CULT: NORMAL
BASOPHILS # BLD: 0 K/UL (ref 0–0.2)
BASOPHILS NFR BLD: 0 % (ref 0–2)
BUN SERPL-MCNC: 17 MG/DL (ref 8–23)
CALCIUM SERPL-MCNC: 8.5 MG/DL (ref 8.8–10.2)
CHLORIDE SERPL-SCNC: 109 MMOL/L (ref 98–107)
CO2 SERPL-SCNC: 28 MMOL/L (ref 20–28)
CREAT SERPL-MCNC: 1.15 MG/DL (ref 0.8–1.3)
DIFFERENTIAL METHOD BLD: ABNORMAL
EOSINOPHIL # BLD: 0.1 K/UL (ref 0–0.8)
EOSINOPHIL NFR BLD: 1 % (ref 0.5–7.8)
ERYTHROCYTE [DISTWIDTH] IN BLOOD BY AUTOMATED COUNT: 17.6 % (ref 11.9–14.6)
GLUCOSE SERPL-MCNC: 105 MG/DL (ref 70–99)
HCT VFR BLD AUTO: 27.7 % (ref 41.1–50.3)
HGB BLD-MCNC: 9.1 G/DL (ref 13.6–17.2)
IMM GRANULOCYTES # BLD AUTO: 0.1 K/UL (ref 0–0.5)
IMM GRANULOCYTES NFR BLD AUTO: 1 % (ref 0–5)
LYMPHOCYTES # BLD: 1.3 K/UL (ref 0.5–4.6)
LYMPHOCYTES NFR BLD: 14 % (ref 13–44)
MCH RBC QN AUTO: 31.2 PG (ref 26.1–32.9)
MCHC RBC AUTO-ENTMCNC: 32.9 G/DL (ref 31.4–35)
MCV RBC AUTO: 94.9 FL (ref 82–102)
MONOCYTES # BLD: 1 K/UL (ref 0.1–1.3)
MONOCYTES NFR BLD: 11 % (ref 4–12)
NEUTS SEG # BLD: 6.7 K/UL (ref 1.7–8.2)
NEUTS SEG NFR BLD: 73 % (ref 43–78)
NRBC # BLD: 0 K/UL (ref 0–0.2)
PLATELET # BLD AUTO: 140 K/UL (ref 150–450)
PMV BLD AUTO: 9.9 FL (ref 9.4–12.3)
POTASSIUM SERPL-SCNC: 4.3 MMOL/L (ref 3.5–5.1)
RBC # BLD AUTO: 2.92 M/UL (ref 4.23–5.6)
SERVICE CMNT-IMP: NORMAL
SERVICE CMNT-IMP: NORMAL
SODIUM SERPL-SCNC: 142 MMOL/L (ref 136–145)
WBC # BLD AUTO: 9.2 K/UL (ref 4.3–11.1)

## 2024-10-22 PROCEDURE — 1100000000 HC RM PRIVATE

## 2024-10-22 PROCEDURE — 85025 COMPLETE CBC W/AUTO DIFF WBC: CPT

## 2024-10-22 PROCEDURE — 36415 COLL VENOUS BLD VENIPUNCTURE: CPT

## 2024-10-22 PROCEDURE — 80048 BASIC METABOLIC PNL TOTAL CA: CPT

## 2024-10-22 RX ORDER — TRAZODONE HYDROCHLORIDE 50 MG/1
50 TABLET, FILM COATED ORAL NIGHTLY
Status: DISCONTINUED | OUTPATIENT
Start: 2024-10-22 | End: 2024-10-24

## 2024-10-22 RX ORDER — LINEZOLID 600 MG/1
600 TABLET, FILM COATED ORAL EVERY 12 HOURS SCHEDULED
Status: DISPENSED | OUTPATIENT
Start: 2024-10-22 | End: 2024-10-24

## 2024-10-22 RX ORDER — PANTOPRAZOLE SODIUM 40 MG/1
40 TABLET, DELAYED RELEASE ORAL
Status: DISCONTINUED | OUTPATIENT
Start: 2024-10-22 | End: 2024-10-27

## 2024-10-22 NOTE — CARE COORDINATION
Family selected [facility], [facility], [facility] and [facility]. Referrals sent.    Albina HOUSER, ACM  [facility] [place]

## 2024-10-23 ENCOUNTER — APPOINTMENT (OUTPATIENT)
Dept: ULTRASOUND IMAGING | Age: 74
DRG: 871 | End: 2024-10-23
Payer: MEDICARE

## 2024-10-23 LAB
ANION GAP SERPL CALC-SCNC: 10 MMOL/L (ref 9–18)
BASOPHILS # BLD: 0 K/UL (ref 0–0.2)
BASOPHILS NFR BLD: 0 % (ref 0–2)
BUN SERPL-MCNC: 14 MG/DL (ref 8–23)
CALCIUM SERPL-MCNC: 8.3 MG/DL (ref 8.8–10.2)
CHLORIDE SERPL-SCNC: 106 MMOL/L (ref 98–107)
CO2 SERPL-SCNC: 26 MMOL/L (ref 20–28)
CREAT SERPL-MCNC: 1.16 MG/DL (ref 0.8–1.3)
DIFFERENTIAL METHOD BLD: ABNORMAL
EOSINOPHIL # BLD: 0.1 K/UL (ref 0–0.8)
EOSINOPHIL NFR BLD: 1 % (ref 0.5–7.8)
ERYTHROCYTE [DISTWIDTH] IN BLOOD BY AUTOMATED COUNT: 18.4 % (ref 11.9–14.6)
GLUCOSE SERPL-MCNC: 126 MG/DL (ref 70–99)
HCT VFR BLD AUTO: 27.1 % (ref 41.1–50.3)
HGB BLD-MCNC: 8.6 G/DL (ref 13.6–17.2)
IMM GRANULOCYTES # BLD AUTO: 0 K/UL (ref 0–0.5)
IMM GRANULOCYTES NFR BLD AUTO: 1 % (ref 0–5)
LYMPHOCYTES # BLD: 1.2 K/UL (ref 0.5–4.6)
LYMPHOCYTES NFR BLD: 14 % (ref 13–44)
MCH RBC QN AUTO: 30.8 PG (ref 26.1–32.9)
MCHC RBC AUTO-ENTMCNC: 31.7 G/DL (ref 31.4–35)
MCV RBC AUTO: 97.1 FL (ref 82–102)
MONOCYTES # BLD: 0.6 K/UL (ref 0.1–1.3)
MONOCYTES NFR BLD: 7 % (ref 4–12)
NEUTS SEG # BLD: 6.2 K/UL (ref 1.7–8.2)
NEUTS SEG NFR BLD: 77 % (ref 43–78)
NRBC # BLD: 0 K/UL (ref 0–0.2)
PLATELET # BLD AUTO: 155 K/UL (ref 150–450)
PMV BLD AUTO: 9.8 FL (ref 9.4–12.3)
POTASSIUM SERPL-SCNC: 4 MMOL/L (ref 3.5–5.1)
RBC # BLD AUTO: 2.79 M/UL (ref 4.23–5.6)
SODIUM SERPL-SCNC: 142 MMOL/L (ref 136–145)
WBC # BLD AUTO: 8.1 K/UL (ref 4.3–11.1)

## 2024-10-23 PROCEDURE — 6370000000 HC RX 637 (ALT 250 FOR IP)

## 2024-10-23 PROCEDURE — 97530 THERAPEUTIC ACTIVITIES: CPT

## 2024-10-23 PROCEDURE — 93971 EXTREMITY STUDY: CPT

## 2024-10-23 PROCEDURE — 92610 EVALUATE SWALLOWING FUNCTION: CPT

## 2024-10-23 PROCEDURE — 93971 EXTREMITY STUDY: CPT | Performed by: RADIOLOGY

## 2024-10-23 PROCEDURE — 6370000000 HC RX 637 (ALT 250 FOR IP): Performed by: FAMILY MEDICINE

## 2024-10-23 PROCEDURE — 97112 NEUROMUSCULAR REEDUCATION: CPT

## 2024-10-23 PROCEDURE — 36415 COLL VENOUS BLD VENIPUNCTURE: CPT

## 2024-10-23 PROCEDURE — 6370000000 HC RX 637 (ALT 250 FOR IP): Performed by: INTERNAL MEDICINE

## 2024-10-23 PROCEDURE — 80048 BASIC METABOLIC PNL TOTAL CA: CPT

## 2024-10-23 PROCEDURE — 1100000000 HC RM PRIVATE

## 2024-10-23 PROCEDURE — 85025 COMPLETE CBC W/AUTO DIFF WBC: CPT

## 2024-10-23 RX ADMIN — QUETIAPINE FUMARATE 25 MG: 25 TABLET ORAL at 20:58

## 2024-10-23 RX ADMIN — RIVAROXABAN 15 MG: 15 TABLET, FILM COATED ORAL at 17:18

## 2024-10-23 RX ADMIN — ATORVASTATIN CALCIUM 80 MG: 40 TABLET, FILM COATED ORAL at 09:22

## 2024-10-23 RX ADMIN — LINEZOLID 600 MG: 600 TABLET, FILM COATED ORAL at 09:21

## 2024-10-23 RX ADMIN — SENNOSIDES 8.6 MG: 8.6 TABLET, FILM COATED ORAL at 09:22

## 2024-10-23 RX ADMIN — GABAPENTIN 400 MG: 300 CAPSULE ORAL at 15:39

## 2024-10-23 RX ADMIN — PANTOPRAZOLE SODIUM 40 MG: 40 TABLET, DELAYED RELEASE ORAL at 17:18

## 2024-10-23 RX ADMIN — GABAPENTIN 400 MG: 300 CAPSULE ORAL at 20:58

## 2024-10-23 RX ADMIN — SENNOSIDES 8.6 MG: 8.6 TABLET, FILM COATED ORAL at 20:58

## 2024-10-23 RX ADMIN — LINEZOLID 600 MG: 600 TABLET, FILM COATED ORAL at 20:58

## 2024-10-23 RX ADMIN — PANTOPRAZOLE SODIUM 40 MG: 40 TABLET, DELAYED RELEASE ORAL at 04:32

## 2024-10-23 RX ADMIN — GABAPENTIN 400 MG: 300 CAPSULE ORAL at 09:22

## 2024-10-23 RX ADMIN — TRAZODONE HYDROCHLORIDE 50 MG: 50 TABLET ORAL at 20:58

## 2024-10-23 NOTE — CARE COORDINATION
Bed offers discussed with Mary via phone. She has accepted [facility], insurance authorization started and approved with a start date of 10/24/24.     Albina HOUSER, ACM  [facility]

## 2024-10-24 ENCOUNTER — APPOINTMENT (OUTPATIENT)
Dept: GENERAL RADIOLOGY | Age: 74
DRG: 871 | End: 2024-10-24
Payer: MEDICARE

## 2024-10-24 ENCOUNTER — APPOINTMENT (OUTPATIENT)
Dept: CT IMAGING | Age: 74
DRG: 871 | End: 2024-10-24
Payer: MEDICARE

## 2024-10-24 LAB
ANION GAP BLD CALC-SCNC: ABNORMAL MMOL/L
ANION GAP SERPL CALC-SCNC: 18 MMOL/L (ref 9–18)
APPEARANCE UR: CLEAR
ARTERIAL PATENCY WRIST A: POSITIVE
BACTERIA URNS QL MICRO: NEGATIVE /HPF
BASE EXCESS BLD CALC-SCNC: 2.6 MMOL/L
BASOPHILS # BLD: 0 K/UL (ref 0–0.2)
BASOPHILS NFR BLD: 0 % (ref 0–2)
BDY SITE: ABNORMAL
BILIRUB UR QL: NEGATIVE
BUN SERPL-MCNC: 27 MG/DL (ref 8–23)
CA-I BLD-MCNC: 1.21 MMOL/L (ref 1.12–1.32)
CALCIUM SERPL-MCNC: 8 MG/DL (ref 8.8–10.2)
CHLORIDE SERPL-SCNC: 102 MMOL/L (ref 98–107)
CO2 BLD-SCNC: 26 MMOL/L (ref 13–23)
CO2 SERPL-SCNC: 19 MMOL/L (ref 20–28)
COLOR UR: ABNORMAL
CREAT SERPL-MCNC: 1.93 MG/DL (ref 0.8–1.3)
DIFFERENTIAL METHOD BLD: ABNORMAL
EOSINOPHIL # BLD: 0 K/UL (ref 0–0.8)
EOSINOPHIL NFR BLD: 0 % (ref 0.5–7.8)
EPI CELLS #/AREA URNS HPF: ABNORMAL /HPF
ERYTHROCYTE [DISTWIDTH] IN BLOOD BY AUTOMATED COUNT: 19.2 % (ref 11.9–14.6)
GAS FLOW.O2 O2 DELIVERY SYS: ABNORMAL
GLUCOSE BLD STRIP.AUTO-MCNC: 122 MG/DL (ref 65–100)
GLUCOSE BLD STRIP.AUTO-MCNC: 127 MG/DL (ref 65–100)
GLUCOSE SERPL-MCNC: 157 MG/DL (ref 70–99)
GLUCOSE UR STRIP.AUTO-MCNC: NEGATIVE MG/DL
HCO3 BLD-SCNC: 26.2 MMOL/L (ref 22–26)
HCT VFR BLD AUTO: 25.4 % (ref 41.1–50.3)
HGB BLD-MCNC: 8 G/DL (ref 13.6–17.2)
HGB UR QL STRIP: NEGATIVE
HYALINE CASTS URNS QL MICRO: ABNORMAL /LPF
IMM GRANULOCYTES # BLD AUTO: 0.3 K/UL (ref 0–0.5)
IMM GRANULOCYTES NFR BLD AUTO: 2 % (ref 0–5)
KETONES UR QL STRIP.AUTO: ABNORMAL MG/DL
LEUKOCYTE ESTERASE UR QL STRIP.AUTO: NEGATIVE
LYMPHOCYTES # BLD: 1.3 K/UL (ref 0.5–4.6)
LYMPHOCYTES NFR BLD: 8 % (ref 13–44)
MCH RBC QN AUTO: 32.3 PG (ref 26.1–32.9)
MCHC RBC AUTO-ENTMCNC: 31.5 G/DL (ref 31.4–35)
MCV RBC AUTO: 102.4 FL (ref 82–102)
MONOCYTES # BLD: 0.5 K/UL (ref 0.1–1.3)
MONOCYTES NFR BLD: 3 % (ref 4–12)
MUCOUS THREADS URNS QL MICRO: 0 /LPF
NEUTS SEG # BLD: 13 K/UL (ref 1.7–8.2)
NEUTS SEG NFR BLD: 86 % (ref 43–78)
NITRITE UR QL STRIP.AUTO: NEGATIVE
NRBC # BLD: 0 K/UL (ref 0–0.2)
PCO2 BLD: 34.8 MMHG (ref 35–45)
PH BLD: 7.49 (ref 7.35–7.45)
PH UR STRIP: 5 (ref 5–9)
PLATELET # BLD AUTO: 140 K/UL (ref 150–450)
PMV BLD AUTO: 10.2 FL (ref 9.4–12.3)
PO2 BLD: 73 MMHG (ref 75–100)
POC FIO2: 3
POTASSIUM BLD-SCNC: 4.6 MMOL/L (ref 3.5–5.1)
POTASSIUM SERPL-SCNC: 4.8 MMOL/L (ref 3.5–5.1)
PROCALCITONIN SERPL-MCNC: 0.13 NG/ML (ref 0–0.1)
PROT UR STRIP-MCNC: NEGATIVE MG/DL
RBC # BLD AUTO: 2.48 M/UL (ref 4.23–5.6)
RBC #/AREA URNS HPF: ABNORMAL /HPF
SAO2 % BLD: 96 %
SERVICE CMNT-IMP: ABNORMAL
SERVICE CMNT-IMP: ABNORMAL
SODIUM BLD-SCNC: 138 MMOL/L (ref 136–145)
SODIUM SERPL-SCNC: 139 MMOL/L (ref 136–145)
SP GR UR REFRACTOMETRY: 1.02 (ref 1–1.02)
SPECIMEN SITE: ABNORMAL
URINE CULTURE IF INDICATED: ABNORMAL
UROBILINOGEN UR QL STRIP.AUTO: 0.2 EU/DL (ref 0.2–1)
WBC # BLD AUTO: 15.1 K/UL (ref 4.3–11.1)
WBC URNS QL MICRO: ABNORMAL /HPF

## 2024-10-24 PROCEDURE — 82947 ASSAY GLUCOSE BLOOD QUANT: CPT

## 2024-10-24 PROCEDURE — 81001 URINALYSIS AUTO W/SCOPE: CPT

## 2024-10-24 PROCEDURE — 82803 BLOOD GASES ANY COMBINATION: CPT

## 2024-10-24 PROCEDURE — 84295 ASSAY OF SERUM SODIUM: CPT

## 2024-10-24 PROCEDURE — 1100000000 HC RM PRIVATE

## 2024-10-24 PROCEDURE — 82962 GLUCOSE BLOOD TEST: CPT

## 2024-10-24 PROCEDURE — 84145 PROCALCITONIN (PCT): CPT

## 2024-10-24 PROCEDURE — 36415 COLL VENOUS BLD VENIPUNCTURE: CPT

## 2024-10-24 PROCEDURE — 82330 ASSAY OF CALCIUM: CPT

## 2024-10-24 PROCEDURE — 71045 X-RAY EXAM CHEST 1 VIEW: CPT

## 2024-10-24 PROCEDURE — 36600 WITHDRAWAL OF ARTERIAL BLOOD: CPT

## 2024-10-24 PROCEDURE — 80048 BASIC METABOLIC PNL TOTAL CA: CPT

## 2024-10-24 PROCEDURE — 70450 CT HEAD/BRAIN W/O DYE: CPT

## 2024-10-24 PROCEDURE — 84132 ASSAY OF SERUM POTASSIUM: CPT

## 2024-10-24 PROCEDURE — 85025 COMPLETE CBC W/AUTO DIFF WBC: CPT

## 2024-10-24 RX ORDER — TRAZODONE HYDROCHLORIDE 50 MG/1
50 TABLET, FILM COATED ORAL NIGHTLY PRN
Status: DISCONTINUED | OUTPATIENT
Start: 2024-10-24 | End: 2024-11-05 | Stop reason: HOSPADM

## 2024-10-25 ENCOUNTER — TELEPHONE (OUTPATIENT)
Dept: GASTROENTEROLOGY | Age: 74
End: 2024-10-25

## 2024-10-25 LAB
ALBUMIN SERPL-MCNC: 2.2 G/DL (ref 3.2–4.6)
ALBUMIN/GLOB SERPL: 1 (ref 1–1.9)
ALP SERPL-CCNC: 55 U/L (ref 40–129)
ALT SERPL-CCNC: 53 U/L (ref 8–55)
AMMONIA PLAS-SCNC: 15 UMOL/L (ref 16–60)
ANION GAP SERPL CALC-SCNC: 12 MMOL/L (ref 9–18)
AST SERPL-CCNC: 34 U/L (ref 15–37)
BASOPHILS # BLD: 0.1 K/UL (ref 0–0.2)
BASOPHILS NFR BLD: 0 % (ref 0–2)
BILIRUB SERPL-MCNC: 0.6 MG/DL (ref 0–1.2)
BUN SERPL-MCNC: 46 MG/DL (ref 8–23)
CALCIUM SERPL-MCNC: 8.1 MG/DL (ref 8.8–10.2)
CHLORIDE SERPL-SCNC: 103 MMOL/L (ref 98–107)
CO2 SERPL-SCNC: 24 MMOL/L (ref 20–28)
CREAT SERPL-MCNC: 1.85 MG/DL (ref 0.8–1.3)
DIFFERENTIAL METHOD BLD: ABNORMAL
EOSINOPHIL # BLD: 0 K/UL (ref 0–0.8)
EOSINOPHIL NFR BLD: 0 % (ref 0.5–7.8)
ERYTHROCYTE [DISTWIDTH] IN BLOOD BY AUTOMATED COUNT: 20.2 % (ref 11.9–14.6)
GLOBULIN SER CALC-MCNC: 2.3 G/DL (ref 2.3–3.5)
GLUCOSE SERPL-MCNC: 116 MG/DL (ref 70–99)
HCT VFR BLD AUTO: 25.7 % (ref 41.1–50.3)
HGB BLD-MCNC: 8 G/DL (ref 13.6–17.2)
IMM GRANULOCYTES # BLD AUTO: 0.2 K/UL (ref 0–0.5)
IMM GRANULOCYTES NFR BLD AUTO: 1 % (ref 0–5)
LACTATE SERPL-SCNC: 2.8 MMOL/L (ref 0.5–2)
LYMPHOCYTES # BLD: 1.4 K/UL (ref 0.5–4.6)
LYMPHOCYTES NFR BLD: 7 % (ref 13–44)
MCH RBC QN AUTO: 31.6 PG (ref 26.1–32.9)
MCHC RBC AUTO-ENTMCNC: 31.1 G/DL (ref 31.4–35)
MCV RBC AUTO: 101.6 FL (ref 82–102)
MONOCYTES # BLD: 1.2 K/UL (ref 0.1–1.3)
MONOCYTES NFR BLD: 6 % (ref 4–12)
NEUTS SEG # BLD: 17.2 K/UL (ref 1.7–8.2)
NEUTS SEG NFR BLD: 86 % (ref 43–78)
NRBC # BLD: 0 K/UL (ref 0–0.2)
PLATELET # BLD AUTO: 140 K/UL (ref 150–450)
PMV BLD AUTO: 9.9 FL (ref 9.4–12.3)
POTASSIUM SERPL-SCNC: 5 MMOL/L (ref 3.5–5.1)
PROCALCITONIN SERPL-MCNC: 0.17 NG/ML (ref 0–0.1)
PROT SERPL-MCNC: 4.5 G/DL (ref 6.3–8.2)
RBC # BLD AUTO: 2.53 M/UL (ref 4.23–5.6)
SODIUM SERPL-SCNC: 139 MMOL/L (ref 136–145)
WBC # BLD AUTO: 20.1 K/UL (ref 4.3–11.1)

## 2024-10-25 PROCEDURE — 95816 EEG AWAKE AND DROWSY: CPT

## 2024-10-25 PROCEDURE — 80053 COMPREHEN METABOLIC PANEL: CPT

## 2024-10-25 PROCEDURE — 2580000003 HC RX 258: Performed by: ANESTHESIOLOGY

## 2024-10-25 PROCEDURE — 6360000002 HC RX W HCPCS: Performed by: PSYCHIATRY & NEUROLOGY

## 2024-10-25 PROCEDURE — 85025 COMPLETE CBC W/AUTO DIFF WBC: CPT

## 2024-10-25 PROCEDURE — 6360000002 HC RX W HCPCS: Performed by: FAMILY MEDICINE

## 2024-10-25 PROCEDURE — 82140 ASSAY OF AMMONIA: CPT

## 2024-10-25 PROCEDURE — 36415 COLL VENOUS BLD VENIPUNCTURE: CPT

## 2024-10-25 PROCEDURE — 1100000003 HC PRIVATE W/ TELEMETRY

## 2024-10-25 PROCEDURE — 84145 PROCALCITONIN (PCT): CPT

## 2024-10-25 PROCEDURE — 83605 ASSAY OF LACTIC ACID: CPT

## 2024-10-25 PROCEDURE — 95819 EEG AWAKE AND ASLEEP: CPT | Performed by: PSYCHIATRY & NEUROLOGY

## 2024-10-25 PROCEDURE — 2580000003 HC RX 258: Performed by: FAMILY MEDICINE

## 2024-10-25 PROCEDURE — 76937 US GUIDE VASCULAR ACCESS: CPT

## 2024-10-25 PROCEDURE — 99222 1ST HOSP IP/OBS MODERATE 55: CPT | Performed by: PSYCHIATRY & NEUROLOGY

## 2024-10-25 RX ORDER — SODIUM CHLORIDE 9 MG/ML
INJECTION, SOLUTION INTRAVENOUS CONTINUOUS
Status: DISCONTINUED | OUTPATIENT
Start: 2024-10-25 | End: 2024-10-29

## 2024-10-25 RX ORDER — METRONIDAZOLE 500 MG/100ML
500 INJECTION, SOLUTION INTRAVENOUS EVERY 8 HOURS
Status: COMPLETED | OUTPATIENT
Start: 2024-10-25 | End: 2024-11-01

## 2024-10-25 RX ORDER — THIAMINE HYDROCHLORIDE 100 MG/ML
500 INJECTION, SOLUTION INTRAMUSCULAR; INTRAVENOUS DAILY
Status: COMPLETED | OUTPATIENT
Start: 2024-10-25 | End: 2024-10-27

## 2024-10-25 RX ADMIN — SODIUM CHLORIDE, PRESERVATIVE FREE 10 ML: 5 INJECTION INTRAVENOUS at 10:15

## 2024-10-25 RX ADMIN — WATER 1000 MG: 1 INJECTION INTRAMUSCULAR; INTRAVENOUS; SUBCUTANEOUS at 11:37

## 2024-10-25 RX ADMIN — SODIUM CHLORIDE: 9 INJECTION, SOLUTION INTRAVENOUS at 10:15

## 2024-10-25 RX ADMIN — METRONIDAZOLE 500 MG: 500 INJECTION, SOLUTION INTRAVENOUS at 11:43

## 2024-10-25 RX ADMIN — THIAMINE HYDROCHLORIDE 500 MG: 100 INJECTION, SOLUTION INTRAMUSCULAR; INTRAVENOUS at 10:19

## 2024-10-25 RX ADMIN — METRONIDAZOLE 500 MG: 500 INJECTION, SOLUTION INTRAVENOUS at 20:02

## 2024-10-25 RX ADMIN — WATER 2000 MG: 1 INJECTION INTRAMUSCULAR; INTRAVENOUS; SUBCUTANEOUS at 10:29

## 2024-10-25 ASSESSMENT — PAIN SCALES - WONG BAKER: WONGBAKER_NUMERICALRESPONSE: NO HURT

## 2024-10-25 ASSESSMENT — PAIN SCALES - GENERAL: PAINLEVEL_OUTOF10: 0

## 2024-10-25 NOTE — CONSULTS
Neurology Consult Note       History: This is a 74-year-old man with chronic alcohol use disorder who was admitted for a GI bleed while on oral anticoagulation.  He received 3 units of packed red blood cells.  The patient was having sundowning so received trazodone and Seroquel which made him overly somnolent.  The patient is also on cefepime.  CT scan head was done which shows atrophy.    On my encounter with the patient, he cannot cooperate with the examiner.  He is somnolent and will not open his eyes or follow any commands.      Exam: Pertinent positives and negatives include:    Cognition: Somnolent.  Difficult to arouse.  Positive palmomental reflex  Language: None  Speech: Will not speak  Cranial nerve examination: Pupils are equal, round, and reactive to light  Motor examination: Does not participate in following commands but spontaneously moves all 4 extremities  Sensory examination: Cannot participate  Cerebellar examination: Cannot participate  Special testing: Muscle stretch reflexes are absent throughout.  Toes are downgoing to plantar stimulation    Imaging and review of data: I personally reviewed the patient's CT scan of the head.  He has severe cortical atrophy      Assessment and Plan: 74-year-old man with delirium.  Delirium is not surprising and is adequately explained by his ongoing medical issues.  First, he has severe cortical atrophy, likely representing dementia, which may be related to neurodegeneration, such as from Alzheimer's disease, or secondary to chronic alcohol use disorder.  This makes the patient prone to delirium with systemic issues, more sensitive to the side effect of medications, and slower to recover from infectious/hemodynamic/metabolic abnormalities.  In addition, the patient is on cefepime which can cause encephalopathy, and also has an underlying infection which can cause encephalopathy.  Further, the patient is out of his familiar environment, is receiving too much

## 2024-10-25 NOTE — CONSULTS
Consult    Patient: Rashel Lane MRN: [id number]     YOB: 1950  Age: 74 y.o.  Sex: male      Subjective:      Rashel Lane is a 74 y.o. male,with h/o HTN, HLD, poor medication compliance, EtOH use, frequent falls, tobacco use, who is being seen for AMS and left sided hemiplegia since last night. The patient was admitted for falls at home with concern for a GI bleed. Patient received 3 units of PRBC due to hemoglobin drop from 11.4 to 5.4 on 10/16. He developed delirium and was started on Seroquel on 10/20 and trazodone on 10/22. He is on 400mg gabapentin TID. The daughter at bedside states that the patient's mentation status was improving 2 days ago. He was able to have conversations and order his food. Per notes, he mentation was improving 2 days ago but yesterday was sedated but responsive to painful stimuli.     Past Medical History:   Diagnosis Date    Anxiety     Dermatophytosis of nail     Diabetes (HCC) 9/12/2014    Diabetic polyneuropathy (HCC)     Heart murmur     AORTIC SYSTOLIC FLOW MURMUR, 2013 [place]    Hepatic encephalopathy (HCC) 5/5/2014    Hyperlipidemia     Hypertension     Metatarsal stress fracture     Metatarsalgia     Osteoarthritis     Peripheral edema     Psychiatric disorder     depression    UTI (urinary tract infection)      Past Surgical History:   Procedure Laterality Date    HERNIA REPAIR  2013    LAPAROTOMY  2012    ABDOMINAL SURGERY     NEUROLOGICAL SURGERY      plate in  head..    OTHER SURGICAL HISTORY      cyst removed from back x 1 week    UPPER GASTROINTESTINAL ENDOSCOPY N/A 10/17/2024    ESOPHAGOGASTRODUODENOSCOPY / GOLD PROBE / EPI performed by Devin August DO at [facility] ENDOSCOPY      Family History   Problem Relation Age of Onset    Colon Cancer Mother     Hypertension Sister     Stroke Father      Social History     Tobacco Use    Smoking status: Some Days    Smokeless tobacco: Never    Tobacco comments:     Quit smoking: LIGHT TOBACCO SMOKER 1-2 A

## 2024-10-25 NOTE — PROCEDURES
PROCEDURE NOTE  Date: 10/16/2024   Name: Rashel Lane  YOB: 1950    Procedures    Procedure: Central Line Insertion  (CPT - 17188)    Indication: Hypotension no vascular access    Chlorohexidine Skin Antiseptic: Yes  Hand Hygiene: Yes  Maximal Barrier Precautions: Yes  Optimal Catheter Site Selection: Yes  Sterile Ultrasound Technique: Yes    Location:  right internal jugular    Time out done and correct patient/location for procedure.    Area prepped and sterilized with chlorhexedine. Sterile drapes applied.  Patient given local lidocane for anesthesia. Using Seldinger technique triple lumen lumen catheter inserted. Guidewire removed. All ports with blood return and lines flushed. Line sutured in place. Patient tolerated procedure well, no complications.   Estimated Blood loss: 5 cc    Cooper West MD      
posteriorly with phase reversal at P4.    Hyperventilation: Hyperventilation was not performed due to medical condition    Photic Stimulation: Photic stimulation was not performed due to medical condition    Sleep: N1 and N2 sleep are present during this recording with vertex sharp waves and positive occipital sharp transients of sleep.    Impression: The results of this EEG are abnormal.  There is slowing of the background consistent with a moderate degree of encephalopathy, nonspecific in origin.  There are no lateralizing features or epileptiform discharges.  There are frequent sharply contoured waveforms with phase reversal at P4.  This may be part of sleep architecture rather than epileptiform discharges.

## 2024-10-26 ENCOUNTER — APPOINTMENT (OUTPATIENT)
Dept: CT IMAGING | Age: 74
DRG: 871 | End: 2024-10-26
Payer: MEDICARE

## 2024-10-26 ENCOUNTER — APPOINTMENT (OUTPATIENT)
Dept: MRI IMAGING | Age: 74
DRG: 871 | End: 2024-10-26
Payer: MEDICARE

## 2024-10-26 ENCOUNTER — APPOINTMENT (OUTPATIENT)
Dept: GENERAL RADIOLOGY | Age: 74
DRG: 871 | End: 2024-10-26
Payer: MEDICARE

## 2024-10-26 LAB
ANION GAP SERPL CALC-SCNC: 11 MMOL/L (ref 9–18)
BASOPHILS # BLD: 0 K/UL (ref 0–0.2)
BASOPHILS NFR BLD: 0 % (ref 0–2)
BUN SERPL-MCNC: 43 MG/DL (ref 8–23)
CALCIUM SERPL-MCNC: 7.7 MG/DL (ref 8.8–10.2)
CHLORIDE SERPL-SCNC: 110 MMOL/L (ref 98–107)
CO2 SERPL-SCNC: 25 MMOL/L (ref 20–28)
CREAT SERPL-MCNC: 1.79 MG/DL (ref 0.8–1.3)
DIFFERENTIAL METHOD BLD: ABNORMAL
EOSINOPHIL # BLD: 0 K/UL (ref 0–0.8)
EOSINOPHIL NFR BLD: 0 % (ref 0.5–7.8)
ERYTHROCYTE [DISTWIDTH] IN BLOOD BY AUTOMATED COUNT: 21.7 % (ref 11.9–14.6)
GLUCOSE BLD STRIP.AUTO-MCNC: 142 MG/DL (ref 65–100)
GLUCOSE SERPL-MCNC: 138 MG/DL (ref 70–99)
HCT VFR BLD AUTO: 25.7 % (ref 41.1–50.3)
HGB BLD-MCNC: 8 G/DL (ref 13.6–17.2)
IMM GRANULOCYTES # BLD AUTO: 0.2 K/UL (ref 0–0.5)
IMM GRANULOCYTES NFR BLD AUTO: 1 % (ref 0–5)
LACTATE SERPL-SCNC: 3 MMOL/L (ref 0.5–2)
LACTATE SERPL-SCNC: 3.4 MMOL/L (ref 0.5–2)
LYMPHOCYTES # BLD: 1.1 K/UL (ref 0.5–4.6)
LYMPHOCYTES NFR BLD: 6 % (ref 13–44)
MAGNESIUM SERPL-MCNC: 2.6 MG/DL (ref 1.8–2.4)
MCH RBC QN AUTO: 31.7 PG (ref 26.1–32.9)
MCHC RBC AUTO-ENTMCNC: 31.1 G/DL (ref 31.4–35)
MCV RBC AUTO: 102 FL (ref 82–102)
MONOCYTES # BLD: 1.1 K/UL (ref 0.1–1.3)
MONOCYTES NFR BLD: 6 % (ref 4–12)
NEUTS SEG # BLD: 15.5 K/UL (ref 1.7–8.2)
NEUTS SEG NFR BLD: 87 % (ref 43–78)
NRBC # BLD: 0 K/UL (ref 0–0.2)
PHOSPHATE SERPL-MCNC: 4.6 MG/DL (ref 2.5–4.5)
PLATELET # BLD AUTO: 136 K/UL (ref 150–450)
PMV BLD AUTO: 10.1 FL (ref 9.4–12.3)
POTASSIUM SERPL-SCNC: 5 MMOL/L (ref 3.5–5.1)
RBC # BLD AUTO: 2.52 M/UL (ref 4.23–5.6)
SERVICE CMNT-IMP: ABNORMAL
SODIUM SERPL-SCNC: 145 MMOL/L (ref 136–145)
WBC # BLD AUTO: 17.9 K/UL (ref 4.3–11.1)

## 2024-10-26 PROCEDURE — 82962 GLUCOSE BLOOD TEST: CPT

## 2024-10-26 PROCEDURE — 80048 BASIC METABOLIC PNL TOTAL CA: CPT

## 2024-10-26 PROCEDURE — 6360000004 HC RX CONTRAST MEDICATION: Performed by: FAMILY MEDICINE

## 2024-10-26 PROCEDURE — 36415 COLL VENOUS BLD VENIPUNCTURE: CPT

## 2024-10-26 PROCEDURE — 1100000003 HC PRIVATE W/ TELEMETRY

## 2024-10-26 PROCEDURE — 87040 BLOOD CULTURE FOR BACTERIA: CPT

## 2024-10-26 PROCEDURE — 6360000002 HC RX W HCPCS: Performed by: PSYCHIATRY & NEUROLOGY

## 2024-10-26 PROCEDURE — 74018 RADEX ABDOMEN 1 VIEW: CPT

## 2024-10-26 PROCEDURE — 70450 CT HEAD/BRAIN W/O DYE: CPT

## 2024-10-26 PROCEDURE — 83605 ASSAY OF LACTIC ACID: CPT

## 2024-10-26 PROCEDURE — 84100 ASSAY OF PHOSPHORUS: CPT

## 2024-10-26 PROCEDURE — 70551 MRI BRAIN STEM W/O DYE: CPT

## 2024-10-26 PROCEDURE — 2580000003 HC RX 258: Performed by: FAMILY MEDICINE

## 2024-10-26 PROCEDURE — 85025 COMPLETE CBC W/AUTO DIFF WBC: CPT

## 2024-10-26 PROCEDURE — 71250 CT THORAX DX C-: CPT

## 2024-10-26 PROCEDURE — 6360000002 HC RX W HCPCS: Performed by: FAMILY MEDICINE

## 2024-10-26 PROCEDURE — 83735 ASSAY OF MAGNESIUM: CPT

## 2024-10-26 PROCEDURE — 2580000003 HC RX 258: Performed by: ANESTHESIOLOGY

## 2024-10-26 PROCEDURE — 99232 SBSQ HOSP IP/OBS MODERATE 35: CPT | Performed by: PSYCHIATRY & NEUROLOGY

## 2024-10-26 RX ORDER — 0.9 % SODIUM CHLORIDE 0.9 %
250 INTRAVENOUS SOLUTION INTRAVENOUS ONCE
Status: DISCONTINUED | OUTPATIENT
Start: 2024-10-26 | End: 2024-11-05 | Stop reason: HOSPADM

## 2024-10-26 RX ORDER — 0.9 % SODIUM CHLORIDE 0.9 %
500 INTRAVENOUS SOLUTION INTRAVENOUS ONCE
Status: COMPLETED | OUTPATIENT
Start: 2024-10-26 | End: 2024-10-26

## 2024-10-26 RX ORDER — DIATRIZOATE MEGLUMINE AND DIATRIZOATE SODIUM 660; 100 MG/ML; MG/ML
15 SOLUTION ORAL; RECTAL
Status: DISCONTINUED | OUTPATIENT
Start: 2024-10-26 | End: 2024-11-05 | Stop reason: HOSPADM

## 2024-10-26 RX ADMIN — SODIUM CHLORIDE, PRESERVATIVE FREE 10 ML: 5 INJECTION INTRAVENOUS at 09:41

## 2024-10-26 RX ADMIN — SODIUM CHLORIDE 500 ML: 9 INJECTION, SOLUTION INTRAVENOUS at 09:47

## 2024-10-26 RX ADMIN — THIAMINE HYDROCHLORIDE 500 MG: 100 INJECTION, SOLUTION INTRAMUSCULAR; INTRAVENOUS at 12:23

## 2024-10-26 RX ADMIN — DIATRIZOATE MEGLUMINE AND DIATRIZOATE SODIUM 15 ML: 660; 100 LIQUID ORAL; RECTAL at 17:16

## 2024-10-26 RX ADMIN — WATER 1000 MG: 1 INJECTION INTRAMUSCULAR; INTRAVENOUS; SUBCUTANEOUS at 12:27

## 2024-10-26 RX ADMIN — SODIUM CHLORIDE, PRESERVATIVE FREE 10 ML: 5 INJECTION INTRAVENOUS at 21:00

## 2024-10-26 RX ADMIN — METRONIDAZOLE 500 MG: 500 INJECTION, SOLUTION INTRAVENOUS at 12:32

## 2024-10-26 RX ADMIN — SODIUM CHLORIDE: 9 INJECTION, SOLUTION INTRAVENOUS at 00:42

## 2024-10-26 RX ADMIN — METRONIDAZOLE 500 MG: 500 INJECTION, SOLUTION INTRAVENOUS at 03:10

## 2024-10-26 ASSESSMENT — PAIN SCALES - WONG BAKER
WONGBAKER_NUMERICALRESPONSE: NO HURT

## 2024-10-26 NOTE — ICUWATCH
Rapid Response Team Note      Subjective: Responded to Rapid Response secondary to hypotension.    Summary: Rapid response called for low BP showing 90s/30s. After multiple attempts on different extremities BP noted to be 127/77 (94). Patient with ongoing decreased responsiveness. STAT CT Head ordered. Patient transported to CT with primary RN. Patient with slight increase in mental status in CT where he made spontaneous one word statements. Patient returned to room with intermittent eye opening to voice. Patient is not able to regularly follow commands.      See Rapid Response/Code Blue Narrator for full documentation    Outcome: Patient to remain in current location. Will follow-up per Rapid Response Team Clinical Rounding protocol.      Hu Tong RN  [place]: [phone]

## 2024-10-26 NOTE — CONSULTS
Comprehensive Nutrition Assessment    Type and Reason for Visit: Reassess, Consult  Malnutrition Screening Tool: Malnutrition Screen  Have you recently lost weight without trying?: 2 to 13 pounds (1 point)  Have you been eating poorly because of a decreased appetite?: Yes (1 point)  Malnutrition Screening Tool Score: 2  General Nutrition Management: Hospitalist    Nutrition Recommendations/Plan:   Meals and Snacks:  Diet:  Changed to NPO d/t somnolence  Enteral Nutrition:   Enteral Access: Nasogastric  Initiate once FT placement confirmed  Formula: Standard without Fiber (Osmolite 1.2 Davis)  Goal Rate: Continuous 65 ml/hr  Initiate  Water flush  115 ml every 4 hours  Modulars: None not indicated at this time   Enteral regimen at above goal to provide per 24 hours:  1716 calories, 79 grams protein and 1863 ml free fluid.    Above regimen: Intended to meet macronutrient goals  IV Fluids:  Continue per provider order  Labs:   EN labs: BMP daily, Mg daily x 7 days at initiation then MWF and Phos daily x 7 days at initiation then MWF.     POC Glucoses/SSI Not indicated  Nutrition Related Medication Management:  Electrolyte Replacement Protocol PRN Continue for Magnesium and Potassium      Phos will be addressed individually due to national shortages  Thiamine Active per MD  Bowel Regimen  Active per MD     Malnutrition Assessment:  Malnutrition Status: Moderate malnutrition  Context: Chronic Illness  Findings of clinical characteristics of malnutrition:   Energy Intake:  Unable to assess  Weight Loss:  Greater than 10% over 6 months (14.6% in 4 months)     Body Fat Loss:  No significant body fat loss     Muscle Mass Loss:  Mild muscle mass loss Temples (temporalis), Clavicles (pectoralis & deltoids), Calf (gastrocnemius), Hand (interosseous)  Fluid Accumulation:  No significant fluid accumulation     Strength:  Normal  strength     Nutrition Assessment:  Nutrition History: Unable to provide history.      Do You

## 2024-10-26 NOTE — ICUWATCH
RRT Clinical Rounding Nurse Progress Report      SUBJECTIVE: Called to assess patient secondary to RN/provider concern - decreased LOC .      Vitals:    10/25/24 0615 10/25/24 0659 10/25/24 1634 10/25/24 1925   BP: (!) 90/55 110/81 110/81 105/73   Pulse:  (!) 108 (!) 108 (!) 102   Resp:  18 18 16   Temp:  97.9 °F (36.6 °C) 97.9 °F (36.6 °C) 98.1 °F (36.7 °C)   TempSrc:  Axillary Axillary Oral   SpO2:  95% 95% 92%   Weight:       Height:              ASSESSMENT:  On arrival to room, I found patient to be resting in bed quietly. Patient attempts to open eyes briefly to verbal command. Patient spontaneously moves extremities, but is unable to consistently follow commands. Respirations even and unlabored. Bilateral lung sounds clear on 2L NC. No needs or concerns expressed at this time.    PLAN:  Recent labs/notes/vitals reviewed, and patient discussed with primary RN. Will follow per RRT Clinical Rounding Program protocol. Primary RN to call with concerns. Plan to follow delirium protocol per neurology provider's recommendation.    Hu Tong RN  [place]: [phone]

## 2024-10-26 NOTE — ICUWATCH
RRT Clinical Rounding Nurse Update    Vitals:    10/26/24 0344 10/26/24 0357 10/26/24 0405 10/26/24 0722   BP: (!) 90/35 (!) 148/96 127/77 97/62   Pulse: (!) 104 (!) 109  96   Resp: 16   16   Temp: 97.7 °F (36.5 °C)   97.9 °F (36.6 °C)   TempSrc: Axillary   Oral   SpO2: 95% 90%  100%   Weight:       Height:          ASSESSMENT:  Previous outreach assessment was reviewed. There have been no significant changes since previous assessment. Patient is currently down for MRI. Has vsc, has been pretty somnolent per staff. Electrolytes (mag/phos) are slightly high per labs this AM.    PLAN:  Will follow per RRT Clinical Rounding Program protocol.    Nikita Frye RN  [facility]: [phone]  [facility]: [phone]

## 2024-10-26 NOTE — ICUWATCH
RRT Clinical Rounding Nurse Update    Vitals:    10/26/24 0405 10/26/24 0722 10/26/24 1159 10/26/24 1500   BP: 127/77 97/62 100/63 122/79   Pulse:  96 98 94   Resp:  16 16 16   Temp:  97.9 °F (36.6 °C) 98.4 °F (36.9 °C) 99 °F (37.2 °C)   TempSrc:  Oral Oral Oral   SpO2:  100% 100% 98%   Weight:       Height:         ASSESSMENT:  Previous outreach assessment was reviewed. There have been no significant changes since previous assessment.    PLAN:  Will follow per RRT Clinical Rounding Program protocol.    Nikita Frye RN  [place]: [phone]  [place]: [phone]

## 2024-10-27 ENCOUNTER — ANESTHESIA EVENT (OUTPATIENT)
Dept: ENDOSCOPY | Age: 74
End: 2024-10-27
Payer: MEDICARE

## 2024-10-27 PROBLEM — I63.9 ACUTE CVA (CEREBROVASCULAR ACCIDENT) (HCC): Status: ACTIVE | Noted: 2024-10-27

## 2024-10-27 PROBLEM — D64.9 ANEMIA: Status: ACTIVE | Noted: 2024-10-15

## 2024-10-27 LAB
ANION GAP SERPL CALC-SCNC: 7 MMOL/L (ref 9–18)
BASOPHILS # BLD: 0 K/UL (ref 0–0.2)
BASOPHILS NFR BLD: 0 % (ref 0–2)
BUN SERPL-MCNC: 30 MG/DL (ref 8–23)
CALCIUM SERPL-MCNC: 7.5 MG/DL (ref 8.8–10.2)
CHLORIDE SERPL-SCNC: 118 MMOL/L (ref 98–107)
CO2 SERPL-SCNC: 24 MMOL/L (ref 20–28)
CREAT SERPL-MCNC: 1.39 MG/DL (ref 0.8–1.3)
DIFFERENTIAL METHOD BLD: ABNORMAL
EOSINOPHIL # BLD: 0.1 K/UL (ref 0–0.8)
EOSINOPHIL NFR BLD: 0 % (ref 0.5–7.8)
ERYTHROCYTE [DISTWIDTH] IN BLOOD BY AUTOMATED COUNT: 23 % (ref 11.9–14.6)
GLUCOSE SERPL-MCNC: 103 MG/DL (ref 70–99)
HCT VFR BLD AUTO: 16.4 % (ref 41.1–50.3)
HGB BLD-MCNC: 4.8 G/DL (ref 13.6–17.2)
HISTORY CHECK: NORMAL
HISTORY CHECK: NORMAL
IMM GRANULOCYTES # BLD AUTO: 0.2 K/UL (ref 0–0.5)
IMM GRANULOCYTES NFR BLD AUTO: 1 % (ref 0–5)
LACTATE SERPL-SCNC: 1.9 MMOL/L (ref 0.5–2)
LYMPHOCYTES # BLD: 1.4 K/UL (ref 0.5–4.6)
LYMPHOCYTES NFR BLD: 10 % (ref 13–44)
MAGNESIUM SERPL-MCNC: 2.6 MG/DL (ref 1.8–2.4)
MCH RBC QN AUTO: 31.6 PG (ref 26.1–32.9)
MCHC RBC AUTO-ENTMCNC: 29.3 G/DL (ref 31.4–35)
MCV RBC AUTO: 107.9 FL (ref 82–102)
MONOCYTES # BLD: 0.7 K/UL (ref 0.1–1.3)
MONOCYTES NFR BLD: 5 % (ref 4–12)
NEUTS SEG # BLD: 12 K/UL (ref 1.7–8.2)
NEUTS SEG NFR BLD: 84 % (ref 43–78)
NRBC # BLD: 0 K/UL (ref 0–0.2)
PHOSPHATE SERPL-MCNC: 3.4 MG/DL (ref 2.5–4.5)
PLATELET # BLD AUTO: 151 K/UL (ref 150–450)
PMV BLD AUTO: 9.7 FL (ref 9.4–12.3)
POTASSIUM SERPL-SCNC: 4.6 MMOL/L (ref 3.5–5.1)
RBC # BLD AUTO: 1.52 M/UL (ref 4.23–5.6)
SODIUM SERPL-SCNC: 148 MMOL/L (ref 136–145)
WBC # BLD AUTO: 14.3 K/UL (ref 4.3–11.1)

## 2024-10-27 PROCEDURE — 86923 COMPATIBILITY TEST ELECTRIC: CPT

## 2024-10-27 PROCEDURE — 84100 ASSAY OF PHOSPHORUS: CPT

## 2024-10-27 PROCEDURE — 99232 SBSQ HOSP IP/OBS MODERATE 35: CPT | Performed by: PSYCHIATRY & NEUROLOGY

## 2024-10-27 PROCEDURE — 86850 RBC ANTIBODY SCREEN: CPT

## 2024-10-27 PROCEDURE — 6360000002 HC RX W HCPCS: Performed by: FAMILY MEDICINE

## 2024-10-27 PROCEDURE — P9016 RBC LEUKOCYTES REDUCED: HCPCS

## 2024-10-27 PROCEDURE — 86900 BLOOD TYPING SEROLOGIC ABO: CPT

## 2024-10-27 PROCEDURE — 6360000002 HC RX W HCPCS: Performed by: PSYCHIATRY & NEUROLOGY

## 2024-10-27 PROCEDURE — 2580000003 HC RX 258: Performed by: FAMILY MEDICINE

## 2024-10-27 PROCEDURE — 86901 BLOOD TYPING SEROLOGIC RH(D): CPT

## 2024-10-27 PROCEDURE — 85025 COMPLETE CBC W/AUTO DIFF WBC: CPT

## 2024-10-27 PROCEDURE — 6370000000 HC RX 637 (ALT 250 FOR IP)

## 2024-10-27 PROCEDURE — 36430 TRANSFUSION BLD/BLD COMPNT: CPT

## 2024-10-27 PROCEDURE — 6370000000 HC RX 637 (ALT 250 FOR IP): Performed by: INTERNAL MEDICINE

## 2024-10-27 PROCEDURE — 99222 1ST HOSP IP/OBS MODERATE 55: CPT | Performed by: STUDENT IN AN ORGANIZED HEALTH CARE EDUCATION/TRAINING PROGRAM

## 2024-10-27 PROCEDURE — 83735 ASSAY OF MAGNESIUM: CPT

## 2024-10-27 PROCEDURE — 80048 BASIC METABOLIC PNL TOTAL CA: CPT

## 2024-10-27 PROCEDURE — 36415 COLL VENOUS BLD VENIPUNCTURE: CPT

## 2024-10-27 PROCEDURE — 2580000003 HC RX 258: Performed by: ANESTHESIOLOGY

## 2024-10-27 PROCEDURE — 83605 ASSAY OF LACTIC ACID: CPT

## 2024-10-27 PROCEDURE — 1100000003 HC PRIVATE W/ TELEMETRY

## 2024-10-27 RX ORDER — SODIUM CHLORIDE 9 MG/ML
INJECTION, SOLUTION INTRAVENOUS PRN
Status: DISCONTINUED | OUTPATIENT
Start: 2024-10-27 | End: 2024-10-30

## 2024-10-27 RX ADMIN — SODIUM CHLORIDE: 9 INJECTION, SOLUTION INTRAVENOUS at 00:48

## 2024-10-27 RX ADMIN — SODIUM CHLORIDE, PRESERVATIVE FREE 40 MG: 5 INJECTION INTRAVENOUS at 13:37

## 2024-10-27 RX ADMIN — METRONIDAZOLE 500 MG: 500 INJECTION, SOLUTION INTRAVENOUS at 03:25

## 2024-10-27 RX ADMIN — THIAMINE HYDROCHLORIDE 500 MG: 100 INJECTION, SOLUTION INTRAMUSCULAR; INTRAVENOUS at 09:41

## 2024-10-27 RX ADMIN — METRONIDAZOLE 500 MG: 500 INJECTION, SOLUTION INTRAVENOUS at 00:50

## 2024-10-27 RX ADMIN — METRONIDAZOLE 500 MG: 500 INJECTION, SOLUTION INTRAVENOUS at 20:08

## 2024-10-27 RX ADMIN — METRONIDAZOLE 500 MG: 500 INJECTION, SOLUTION INTRAVENOUS at 12:49

## 2024-10-27 RX ADMIN — SODIUM CHLORIDE, PRESERVATIVE FREE 10 ML: 5 INJECTION INTRAVENOUS at 20:09

## 2024-10-27 RX ADMIN — AMLODIPINE BESYLATE 5 MG: 5 TABLET ORAL at 09:39

## 2024-10-27 RX ADMIN — ATORVASTATIN CALCIUM 80 MG: 40 TABLET, FILM COATED ORAL at 09:39

## 2024-10-27 RX ADMIN — SENNOSIDES 8.6 MG: 8.6 TABLET, FILM COATED ORAL at 09:39

## 2024-10-27 RX ADMIN — SODIUM CHLORIDE, PRESERVATIVE FREE 10 ML: 5 INJECTION INTRAVENOUS at 09:41

## 2024-10-27 RX ADMIN — WATER 1000 MG: 1 INJECTION INTRAMUSCULAR; INTRAVENOUS; SUBCUTANEOUS at 12:36

## 2024-10-27 ASSESSMENT — PAIN SCALES - WONG BAKER
WONGBAKER_NUMERICALRESPONSE: NO HURT
WONGBAKER_NUMERICALRESPONSE: NO HURT

## 2024-10-27 NOTE — ICUWATCH
RRT Clinical Rounding Nurse Update    Vitals:    10/27/24 1511 10/27/24 1525 10/27/24 1601 10/27/24 1630   BP: 96/61 (!) 108/37 104/60 121/61   Pulse: 100 100 (!) 108 94   Resp: 16 18 18 18   Temp: 98.5 °F (36.9 °C) 98.1 °F (36.7 °C) 98.3 °F (36.8 °C) 98 °F (36.7 °C)   TempSrc: Oral Oral Oral Oral   SpO2: 99% 99% 100% 100%   Weight:       Height:            ASSESSMENT:  Called back earlier today to assess 2/2 drop in Hgb today from 8.0 to 4.8. Patient virtually asymptomatic, but Hgb confirmed on recollect. Assisted with placement of additional PIV and patient now receiving blood transfusion (1st of 2 units). Remains hemodynamically stable.     PLAN:  Will follow per RRT Clinical Rounding Program protocol.    Ion Conn RN  [facility]: [phone]  [facility]: [phone]

## 2024-10-27 NOTE — ICUWATCH
RRT Clinical Rounding Nurse Progress Report      SUBJECTIVE: Called to assess patient secondary to RN/provider concern - AMS .      Vitals:    10/26/24 0722 10/26/24 1159 10/26/24 1500 10/26/24 1955   BP: 97/62 100/63 122/79 114/64   Pulse: 96 98 94 (!) 101   Resp: 16 16 16 16   Temp: 97.9 °F (36.6 °C) 98.4 °F (36.9 °C) 99 °F (37.2 °C) 98.4 °F (36.9 °C)   TempSrc: Oral Oral Oral Oral   SpO2: 100% 100% 98% 98%   Weight:       Height:              ASSESSMENT:  On arrival to room, I found patient to be resting in bed quietly. Patient intermittently responds to verbal command. Patient opens eyes for less than 5 seconds. Patient is oriented X 1 to self with repeated questioning. Respirations even and unlabored. Bilateral lung sounds diminished on 2L NC. Patient pulled out NGT prior to exam. NGT replaced with assistance from primary RN and charge RN. No needs or concerns expressed at this time.    PLAN:  Recent labs/notes/vitals reviewed, and patient discussed with primary RN. No concern per primary RN. Will follow per RRT Clinical Rounding Program protocol. Primary RN to call with concerns.    Hu Tong RN  [place]: [phone]   no

## 2024-10-27 NOTE — ICUWATCH
RRT Clinical Rounding Nurse Update    Vitals:    10/26/24 1159 10/26/24 1500 10/26/24 1915 10/26/24 1955   BP: 100/63 122/79  114/64   Pulse: 98 94 92 (!) 101   Resp: 16 16  16   Temp: 98.4 °F (36.9 °C) 99 °F (37.2 °C)  98.4 °F (36.9 °C)   TempSrc: Oral Oral  Oral   SpO2: 100% 98%  98%   Weight:       Height:            ASSESSMENT:  Previous outreach assessment was reviewed. There have been no significant clinical changes since the completion of the last dated Outreach assessment. No concerns per primary RN. Patient significantly more interactive on exam. Patient able to follow commands. Patient remains oriented to self.    PLAN:  Will discharge from RRT Clinical Rounding Program per protocol. Please call if needed.    Hu Tong RN  [place]: [phone]

## 2024-10-27 NOTE — ACP (ADVANCE CARE PLANNING)
Advance Care Planning Note   Admit Date:  10/15/2024  2:07 AM   Name:  Rashel Lane   Age:  74 y.o.  Sex:  male  :  1950   MRN:  [id number]   Room:  [facility]    Rashel Lane has capacity to make his own decisions:   No    If pt unable to make decisions, POA/surrogate decision maker:  Daughter    Other people present:   None    Patient / surrogate decision-maker directed code status:  Full Code    Other ACP topics discussed, if applicable:   I spoke to daughter on phone and discussed patient's current condition and goal of care.  Discussed MRI positive for stroke and patient with underlying dementia with overall poor neurological prognosis.  Also there is concern for GI bleed.  Discussed DNR and hospice.  Daughter does not want her father to suffer but  unsure at this time.  She says she does not want patient to be on ventilator support but would like to think about it and let us know by tomorrow.  Asked few questions about hospice and mentioned couple of times during discussion that she does not want her father to suffer and does not want her father on ventilator support but when I asked you want me to put that in the chart she was reluctant and would like us to wait till tomorrow.    We will keep patient full code at this time and will continue to address goal of care with family.  Daughter and patient's girlfriend will be here tomorrow morning to discuss further.    Patient or surrogate consented to discussion of the current conditions, workup, management plans, prognosis, and the risk for further deterioration.  Time spent: 18 minutes in direct discussion.      Signed:  YANIQUE LUJAN MD

## 2024-10-28 ENCOUNTER — ANESTHESIA (OUTPATIENT)
Dept: ENDOSCOPY | Age: 74
End: 2024-10-28
Payer: MEDICARE

## 2024-10-28 ENCOUNTER — APPOINTMENT (OUTPATIENT)
Dept: GENERAL RADIOLOGY | Age: 74
DRG: 871 | End: 2024-10-28
Payer: MEDICARE

## 2024-10-28 LAB
ANION GAP SERPL CALC-SCNC: 9 MMOL/L (ref 9–18)
BASOPHILS # BLD: 0 K/UL (ref 0–0.2)
BASOPHILS NFR BLD: 0 % (ref 0–2)
BUN SERPL-MCNC: 22 MG/DL (ref 8–23)
CALCIUM SERPL-MCNC: 7.8 MG/DL (ref 8.8–10.2)
CHLORIDE SERPL-SCNC: 118 MMOL/L (ref 98–107)
CO2 SERPL-SCNC: 22 MMOL/L (ref 20–28)
CREAT SERPL-MCNC: 1.25 MG/DL (ref 0.8–1.3)
DIFFERENTIAL METHOD BLD: ABNORMAL
EOSINOPHIL # BLD: 0.1 K/UL (ref 0–0.8)
EOSINOPHIL NFR BLD: 0 % (ref 0.5–7.8)
ERYTHROCYTE [DISTWIDTH] IN BLOOD BY AUTOMATED COUNT: 22 % (ref 11.9–14.6)
GLUCOSE SERPL-MCNC: 92 MG/DL (ref 70–99)
HCT VFR BLD AUTO: 22.3 % (ref 41.1–50.3)
HCT VFR BLD AUTO: 22.5 % (ref 41.1–50.3)
HCT VFR BLD AUTO: 22.9 % (ref 41.1–50.3)
HCT VFR BLD AUTO: 27.4 % (ref 41.1–50.3)
HGB BLD-MCNC: 6.4 G/DL (ref 13.6–17.2)
HGB BLD-MCNC: 7.1 G/DL (ref 13.6–17.2)
HGB BLD-MCNC: 7.1 G/DL (ref 13.6–17.2)
HGB BLD-MCNC: 8.8 G/DL (ref 13.6–17.2)
HISTORY CHECK: NORMAL
IMM GRANULOCYTES # BLD AUTO: 0.1 K/UL (ref 0–0.5)
IMM GRANULOCYTES NFR BLD AUTO: 1 % (ref 0–5)
LYMPHOCYTES # BLD: 1.6 K/UL (ref 0.5–4.6)
LYMPHOCYTES NFR BLD: 13 % (ref 13–44)
MAGNESIUM SERPL-MCNC: 2.6 MG/DL (ref 1.8–2.4)
MCH RBC QN AUTO: 31 PG (ref 26.1–32.9)
MCHC RBC AUTO-ENTMCNC: 31 G/DL (ref 31.4–35)
MCV RBC AUTO: 100 FL (ref 82–102)
MONOCYTES # BLD: 0.7 K/UL (ref 0.1–1.3)
MONOCYTES NFR BLD: 6 % (ref 4–12)
NEUTS SEG # BLD: 10 K/UL (ref 1.7–8.2)
NEUTS SEG NFR BLD: 80 % (ref 43–78)
NRBC # BLD: 0.02 K/UL (ref 0–0.2)
PHOSPHATE SERPL-MCNC: 2.8 MG/DL (ref 2.5–4.5)
PLATELET # BLD AUTO: 145 K/UL (ref 150–450)
PMV BLD AUTO: 9.8 FL (ref 9.4–12.3)
POTASSIUM SERPL-SCNC: 4.5 MMOL/L (ref 3.5–5.1)
RBC # BLD AUTO: 2.29 M/UL (ref 4.23–5.6)
SODIUM SERPL-SCNC: 149 MMOL/L (ref 136–145)
WBC # BLD AUTO: 12.4 K/UL (ref 4.3–11.1)

## 2024-10-28 PROCEDURE — 6370000000 HC RX 637 (ALT 250 FOR IP): Performed by: FAMILY MEDICINE

## 2024-10-28 PROCEDURE — 2580000003 HC RX 258: Performed by: FAMILY MEDICINE

## 2024-10-28 PROCEDURE — 6360000002 HC RX W HCPCS: Performed by: NURSE ANESTHETIST, CERTIFIED REGISTERED

## 2024-10-28 PROCEDURE — 6360000002 HC RX W HCPCS: Performed by: FAMILY MEDICINE

## 2024-10-28 PROCEDURE — 7100000011 HC PHASE II RECOVERY - ADDTL 15 MIN: Performed by: STUDENT IN AN ORGANIZED HEALTH CARE EDUCATION/TRAINING PROGRAM

## 2024-10-28 PROCEDURE — 36430 TRANSFUSION BLD/BLD COMPNT: CPT

## 2024-10-28 PROCEDURE — 0DH68UZ INSERTION OF FEEDING DEVICE INTO STOMACH, VIA NATURAL OR ARTIFICIAL OPENING ENDOSCOPIC: ICD-10-PCS | Performed by: STUDENT IN AN ORGANIZED HEALTH CARE EDUCATION/TRAINING PROGRAM

## 2024-10-28 PROCEDURE — 80048 BASIC METABOLIC PNL TOTAL CA: CPT

## 2024-10-28 PROCEDURE — 3700000000 HC ANESTHESIA ATTENDED CARE: Performed by: STUDENT IN AN ORGANIZED HEALTH CARE EDUCATION/TRAINING PROGRAM

## 2024-10-28 PROCEDURE — 43235 EGD DIAGNOSTIC BRUSH WASH: CPT | Performed by: STUDENT IN AN ORGANIZED HEALTH CARE EDUCATION/TRAINING PROGRAM

## 2024-10-28 PROCEDURE — 36415 COLL VENOUS BLD VENIPUNCTURE: CPT

## 2024-10-28 PROCEDURE — 2580000003 HC RX 258: Performed by: NURSE ANESTHETIST, CERTIFIED REGISTERED

## 2024-10-28 PROCEDURE — 3E0G76Z INTRODUCTION OF NUTRITIONAL SUBSTANCE INTO UPPER GI, VIA NATURAL OR ARTIFICIAL OPENING: ICD-10-PCS | Performed by: STUDENT IN AN ORGANIZED HEALTH CARE EDUCATION/TRAINING PROGRAM

## 2024-10-28 PROCEDURE — P9016 RBC LEUKOCYTES REDUCED: HCPCS

## 2024-10-28 PROCEDURE — 3609017100 HC EGD: Performed by: STUDENT IN AN ORGANIZED HEALTH CARE EDUCATION/TRAINING PROGRAM

## 2024-10-28 PROCEDURE — 1100000003 HC PRIVATE W/ TELEMETRY

## 2024-10-28 PROCEDURE — C1769 GUIDE WIRE: HCPCS | Performed by: STUDENT IN AN ORGANIZED HEALTH CARE EDUCATION/TRAINING PROGRAM

## 2024-10-28 PROCEDURE — 84100 ASSAY OF PHOSPHORUS: CPT

## 2024-10-28 PROCEDURE — 85014 HEMATOCRIT: CPT

## 2024-10-28 PROCEDURE — 2709999900 HC NON-CHARGEABLE SUPPLY: Performed by: STUDENT IN AN ORGANIZED HEALTH CARE EDUCATION/TRAINING PROGRAM

## 2024-10-28 PROCEDURE — 3700000001 HC ADD 15 MINUTES (ANESTHESIA): Performed by: STUDENT IN AN ORGANIZED HEALTH CARE EDUCATION/TRAINING PROGRAM

## 2024-10-28 PROCEDURE — 74018 RADEX ABDOMEN 1 VIEW: CPT

## 2024-10-28 PROCEDURE — 99223 1ST HOSP IP/OBS HIGH 75: CPT | Performed by: NURSE PRACTITIONER

## 2024-10-28 PROCEDURE — 7100000010 HC PHASE II RECOVERY - FIRST 15 MIN: Performed by: STUDENT IN AN ORGANIZED HEALTH CARE EDUCATION/TRAINING PROGRAM

## 2024-10-28 PROCEDURE — 85025 COMPLETE CBC W/AUTO DIFF WBC: CPT

## 2024-10-28 PROCEDURE — 43761 REPOSITION GASTROSTOMY TUBE: CPT | Performed by: STUDENT IN AN ORGANIZED HEALTH CARE EDUCATION/TRAINING PROGRAM

## 2024-10-28 PROCEDURE — 83735 ASSAY OF MAGNESIUM: CPT

## 2024-10-28 PROCEDURE — 85018 HEMOGLOBIN: CPT

## 2024-10-28 RX ORDER — SENNOSIDES A AND B 8.6 MG/1
1 TABLET, FILM COATED ORAL 2 TIMES DAILY
Status: DISCONTINUED | OUTPATIENT
Start: 2024-10-28 | End: 2024-11-05 | Stop reason: HOSPADM

## 2024-10-28 RX ORDER — AMLODIPINE BESYLATE 5 MG/1
5 TABLET ORAL DAILY
Status: DISCONTINUED | OUTPATIENT
Start: 2024-10-29 | End: 2024-10-28

## 2024-10-28 RX ORDER — SENNOSIDES A AND B 8.6 MG/1
1 TABLET, FILM COATED ORAL 2 TIMES DAILY
Status: DISCONTINUED | OUTPATIENT
Start: 2024-10-28 | End: 2024-10-28

## 2024-10-28 RX ORDER — PHENYLEPHRINE HYDROCHLORIDE 10 MG/ML
INJECTION INTRAVENOUS
Status: DISCONTINUED | OUTPATIENT
Start: 2024-10-28 | End: 2024-10-28 | Stop reason: SDUPTHER

## 2024-10-28 RX ORDER — ATORVASTATIN CALCIUM 40 MG/1
80 TABLET, FILM COATED ORAL DAILY
Status: DISCONTINUED | OUTPATIENT
Start: 2024-10-29 | End: 2024-10-28

## 2024-10-28 RX ORDER — AMLODIPINE BESYLATE 5 MG/1
5 TABLET ORAL DAILY
Status: DISCONTINUED | OUTPATIENT
Start: 2024-10-28 | End: 2024-11-05 | Stop reason: HOSPADM

## 2024-10-28 RX ORDER — ATORVASTATIN CALCIUM 40 MG/1
80 TABLET, FILM COATED ORAL DAILY
Status: DISCONTINUED | OUTPATIENT
Start: 2024-10-28 | End: 2024-11-05 | Stop reason: HOSPADM

## 2024-10-28 RX ORDER — METOPROLOL TARTRATE 50 MG
100 TABLET ORAL 2 TIMES DAILY
Status: DISCONTINUED | OUTPATIENT
Start: 2024-10-28 | End: 2024-10-28

## 2024-10-28 RX ORDER — PROPOFOL 10 MG/ML
INJECTION, EMULSION INTRAVENOUS
Status: DISCONTINUED | OUTPATIENT
Start: 2024-10-28 | End: 2024-10-28 | Stop reason: SDUPTHER

## 2024-10-28 RX ORDER — SODIUM CHLORIDE 0.9 % (FLUSH) 0.9 %
SYRINGE (ML) INJECTION
Status: DISCONTINUED | OUTPATIENT
Start: 2024-10-28 | End: 2024-10-28 | Stop reason: SDUPTHER

## 2024-10-28 RX ORDER — METOPROLOL TARTRATE 50 MG
100 TABLET ORAL 2 TIMES DAILY
Status: DISCONTINUED | OUTPATIENT
Start: 2024-10-28 | End: 2024-11-05 | Stop reason: HOSPADM

## 2024-10-28 RX ADMIN — SODIUM CHLORIDE, PRESERVATIVE FREE 40 MG: 5 INJECTION INTRAVENOUS at 00:38

## 2024-10-28 RX ADMIN — SODIUM CHLORIDE, PRESERVATIVE FREE 5 ML: 5 INJECTION INTRAVENOUS at 14:10

## 2024-10-28 RX ADMIN — SODIUM CHLORIDE, PRESERVATIVE FREE 5 ML: 5 INJECTION INTRAVENOUS at 14:16

## 2024-10-28 RX ADMIN — METRONIDAZOLE 500 MG: 500 INJECTION, SOLUTION INTRAVENOUS at 21:27

## 2024-10-28 RX ADMIN — PROPOFOL 20 MG: 10 INJECTION, EMULSION INTRAVENOUS at 14:18

## 2024-10-28 RX ADMIN — SODIUM CHLORIDE, PRESERVATIVE FREE 5 ML: 5 INJECTION INTRAVENOUS at 14:12

## 2024-10-28 RX ADMIN — SODIUM CHLORIDE, PRESERVATIVE FREE 5 ML: 5 INJECTION INTRAVENOUS at 14:14

## 2024-10-28 RX ADMIN — PROPOFOL 20 MG: 10 INJECTION, EMULSION INTRAVENOUS at 14:21

## 2024-10-28 RX ADMIN — SODIUM CHLORIDE, PRESERVATIVE FREE 40 MG: 5 INJECTION INTRAVENOUS at 12:56

## 2024-10-28 RX ADMIN — PROPOFOL 20 MG: 10 INJECTION, EMULSION INTRAVENOUS at 14:14

## 2024-10-28 RX ADMIN — METRONIDAZOLE 500 MG: 500 INJECTION, SOLUTION INTRAVENOUS at 11:26

## 2024-10-28 RX ADMIN — ATORVASTATIN CALCIUM 80 MG: 40 TABLET, FILM COATED ORAL at 10:24

## 2024-10-28 RX ADMIN — METRONIDAZOLE 500 MG: 500 INJECTION, SOLUTION INTRAVENOUS at 03:24

## 2024-10-28 RX ADMIN — PROPOFOL 30 MG: 10 INJECTION, EMULSION INTRAVENOUS at 14:10

## 2024-10-28 RX ADMIN — WATER 1000 MG: 1 INJECTION INTRAMUSCULAR; INTRAVENOUS; SUBCUTANEOUS at 11:08

## 2024-10-28 RX ADMIN — SODIUM CHLORIDE: 9 INJECTION, SOLUTION INTRAVENOUS at 12:56

## 2024-10-28 RX ADMIN — SODIUM CHLORIDE, PRESERVATIVE FREE 5 ML: 5 INJECTION INTRAVENOUS at 14:28

## 2024-10-28 RX ADMIN — SODIUM CHLORIDE: 9 INJECTION, SOLUTION INTRAVENOUS at 00:45

## 2024-10-28 RX ADMIN — AMLODIPINE BESYLATE 5 MG: 5 TABLET ORAL at 10:24

## 2024-10-28 RX ADMIN — PHENYLEPHRINE HYDROCHLORIDE 100 MCG: 10 INJECTION INTRAVENOUS at 14:12

## 2024-10-28 RX ADMIN — PROPOFOL 20 MG: 10 INJECTION, EMULSION INTRAVENOUS at 14:16

## 2024-10-28 RX ADMIN — METOPROLOL TARTRATE 100 MG: 50 TABLET, FILM COATED ORAL at 10:24

## 2024-10-28 RX ADMIN — PHENYLEPHRINE HYDROCHLORIDE 150 MCG: 10 INJECTION INTRAVENOUS at 14:25

## 2024-10-28 RX ADMIN — SENNOSIDES 8.6 MG: 8.6 TABLET, FILM COATED ORAL at 10:24

## 2024-10-28 RX ADMIN — PROPOFOL 20 MG: 10 INJECTION, EMULSION INTRAVENOUS at 14:12

## 2024-10-28 RX ADMIN — SODIUM CHLORIDE, PRESERVATIVE FREE 5 ML: 5 INJECTION INTRAVENOUS at 14:18

## 2024-10-28 ASSESSMENT — PAIN SCALES - WONG BAKER: WONGBAKER_NUMERICALRESPONSE: HURTS A LITTLE BIT

## 2024-10-28 ASSESSMENT — PAIN - FUNCTIONAL ASSESSMENT: PAIN_FUNCTIONAL_ASSESSMENT: ADULT NONVERBAL PAIN SCALE (NPVS)

## 2024-10-28 ASSESSMENT — PAIN SCALES - GENERAL: PAINLEVEL_OUTOF10: 0

## 2024-10-28 ASSESSMENT — LIFESTYLE VARIABLES: SMOKING_STATUS: 1

## 2024-10-28 NOTE — ACP (ADVANCE CARE PLANNING)
Advance Care Planning Note   Admit Date:  10/15/2024  2:07 AM   Name:  Rashel Lane   Age:  74 y.o.  Sex:  male  :  1950   MRN:  [id number]   Room:  [facility]    Rashel Lane has capacity to make his own decisions:   No    If pt unable to make decisions, POA/surrogate decision maker:  Daughter    Other people present:   Daughter and Girlfriend    Patient / surrogate decision-maker directed code status:  DNR/DNI    Other ACP topics discussed, if applicable:   Discussed possibility of hospice or comfort measures.   Daughter would like to talk to palliative care and case management to see her options for placement.  Daughter and girlfriend both work and unable to take care of patient at home    Patient or surrogate consented to discussion of the current conditions, workup, management plans, prognosis, and the risk for further deterioration.  Time spent: 18 minutes in direct discussion.      Signed:  YANIQUE LUJAN MD

## 2024-10-28 NOTE — CARE COORDINATION
Chart reviewed and patient discussed in IDT rounds this AM.  Family is awaiting results of today's EGD. Palliative care is follow up with family tomorrow. CM spoke with Mary via phone answering questions about POA paperwork. Cm explained patient is not able to complete health care POA paperwork, and the legal decision maker will be his legal next of kin, which is Mary. Mary voiced understanding. CM explained if family elected hospice, the options include hospice house, home hospice and hospice at a facility. CM explained patient would have to qualify for hospice house. CM informed Mary, if they selected hospice at a facility, family/patient is responsible for room and board. Mary voice they do not have the funds for that. Family is meeting with palliative care again in the morning, Mary plans on being here around 0900. CM will meet with her afterwards to help discharge planning.    Albina HOUSER, ACM  [place]

## 2024-10-28 NOTE — ANESTHESIA POSTPROCEDURE EVALUATION
Department of Anesthesiology  Postprocedure Note    Patient: Rashel Lane  MRN: [id number]  YOB: 1950  Date of evaluation: 10/28/2024    Procedure Summary       Date: 10/28/24 Room / Location: [place] [address] / [facility] ENDOSCOPY    Anesthesia Start: 1404 Anesthesia Stop: 1436    Procedure: ESOPHAGOGASTRODUODENOSCOPY and for Dobhoff placement (Upper GI Region) Diagnosis:       Anemia      (Anemia [D64.9])    Surgeons: Daria Gonzales MD Responsible Provider: Maria E Jimenes MD    Anesthesia Type: TIVA ASA Status: 4 - Emergent            Anesthesia Type: TIVA    Segundo Phase I: Segundo Score: 9    Segundo Phase II: Segundo Score: 9    Anesthesia Post Evaluation    Patient location during evaluation: PACU  Patient participation: complete - patient cannot participate  Level of consciousness: confused  Airway patency: patent  Nausea & Vomiting: no nausea and no vomiting  Cardiovascular status: hemodynamically stable  Respiratory status: acceptable  Hydration status: euvolemic    No notable events documented.

## 2024-10-28 NOTE — ICUWATCH
RRT Clinical Rounding Nurse Update    Vitals:    10/27/24 1957 10/27/24 2040 10/27/24 2231 10/28/24 0327   BP: 139/73 107/62 112/64 129/86   Pulse: 100 95 (!) 102 91   Resp: 19 18 22 18   Temp: 98.6 °F (37 °C) 98 °F (36.7 °C) 98 °F (36.7 °C) 98.2 °F (36.8 °C)   TempSrc: Axillary Axillary Axillary Axillary   SpO2: 100% 100% 100% 99%   Weight:       Height:            ASSESSMENT:  Previous outreach assessment was reviewed. There have been no significant clinical changes since the completion of the last dated Outreach assessment. No concerns per primary RN. Neurologic status remains unchanged. Patient's Hgb improved to 7.1 following transfusion.    PLAN:  Will follow per RRT Clinical Rounding Program protocol.    Hu Tong RN  [place]: [phone]

## 2024-10-28 NOTE — ANESTHESIA PRE PROCEDURE
Department of Anesthesiology  Preprocedure Note       Name:  Rashel Lane   Age:  74 y.o.  :  1950                                          MRN:  [id number]         Date:  10/27/2024      Surgeon: Surgeon(s):  Daria Gonzales MD    Procedure: Procedure(s):  ESOPHAGOGASTRODUODENOSCOPY and for Dobhoff placement    Medications prior to admission:   Prior to Admission medications    Medication Sig Start Date End Date Taking? Authorizing Provider   psyllium 0.52 g capsule Take 2 capsules by mouth daily 24  Yes Ugo Perez MD   amLODIPine (NORVASC) 5 MG tablet Take 1 tablet by mouth daily    Ugo Perez MD   gabapentin (NEURONTIN) 400 MG capsule Take 1 capsule by mouth 3 times daily.    Ugo Perez MD   ketoconazole (NIZORAL) 2 % cream Apply topically daily    Ugo Perez MD   lisinopril (PRINIVIL;ZESTRIL) 5 MG tablet Take 1 tablet by mouth daily    Ugo Perez MD   naproxen (NAPROSYN) 250 MG tablet Take 1 tablet by mouth 2 times daily as needed for Pain    ProviderUgo MD   polyethylene glycol (GLYCOLAX) 17 GM/SCOOP powder Take 17 g by mouth daily 10/12/24 11/11/24  Mari Dong APRN - CNP   atorvastatin (LIPITOR) 80 MG tablet Take 80 mg by mouth 19   Automatic Reconciliation, Ar   clopidogrel (PLAVIX) 75 MG tablet Take 75 mg by mouth daily 19   Automatic Reconciliation, Ar   docusate (COLACE, DULCOLAX) 100 MG CAPS Take 100 mg by mouth 2 times daily    Automatic Reconciliation, Ar   losartan (COZAAR) 50 MG tablet TAKE 1 TABLET BY MOUTH EVERY DAY 19   Automatic Reconciliation, Ar   metFORMIN (GLUCOPHAGE) 500 MG tablet Take 1 tablet by mouth 2 times daily 19   Automatic Reconciliation, Ar   metoprolol (LOPRESSOR) 100 MG tablet Take 100 mg by mouth 2 times daily 19   Automatic Reconciliation, Ar   nitroGLYCERIN (NITROSTAT) 0.4 MG SL tablet Place 0.4 mg under the tongue 19   Automatic Reconciliation, Ar   raNITIdine 
chloride 10 mEq/100 mL IVPB (Peripheral Line)  10 mEq IntraVENous PRN Tyrese Reagan MD       • magnesium sulfate 2000 mg in 50 mL IVPB premix  2,000 mg IntraVENous PRN Tyrese Reagan MD       • ondansetron (ZOFRAN-ODT) disintegrating tablet 4 mg  4 mg Oral Q8H PRN Tyrese Reagan MD        Or   • ondansetron (ZOFRAN) injection 4 mg  4 mg IntraVENous Q6H PRN Tyrese Reagan MD       • polyethylene glycol (GLYCOLAX) packet 17 g  17 g Oral Daily PRN Tyrese Reagan MD       • acetaminophen (TYLENOL) tablet 650 mg  650 mg Oral Q6H PRN Tyrese Reagan MD        Or   • acetaminophen (TYLENOL) suppository 650 mg  650 mg Rectal Q6H PRN Tyrese Reagan MD       • bisacodyl (DULCOLAX) suppository 10 mg  10 mg Rectal Daily PRN Tyrese Reagan MD       • melatonin tablet 3 mg  3 mg Oral Nightly PRN Margie Cheung PA   3 mg at 10/21/24 2022       Allergies:    Allergies   Allergen Reactions   • Penicillins Itching     *PCN-200, ASSORTED CLASSES*       Problem List:    Patient Active Problem List   Diagnosis Code   • Overweight (BMI 25.0-29.9) E66.3   • Anxiety F41.9   • Diabetic polyneuropathy (HCC) E11.42   • Diabetes (HCC) E11.9   • Acute metabolic encephalopathy G93.41   • Alcohol abuse F10.10   • Vomiting R11.10   • Coronary artery disease involving native coronary artery of native heart I25.10   • GERD (gastroesophageal reflux disease) K21.9   • Osteoarthritis M19.90   • HTN (hypertension) I10   • Type 2 diabetes with nephropathy (HCC) E11.21   • Atrial fibrillation (HCC) I48.91   • Hyperlipidemia E78.5   • Septic shock (HCC) A41.9, R65.21   • Leukocytosis D72.829   • Constipation K59.00   • Lactic acid increased E87.20   • Moderate malnutrition (HCC) E44.0   • Dehydration E86.0   • Gastrointestinal hemorrhage K92.2   • Acute encephalopathy G93.40   • Acute blood loss anemia D62   • Hemorrhagic shock (HCC) R57.8   • HAM (acute kidney injury) (HCC)

## 2024-10-28 NOTE — H&P
HISTORY AND PHYSICAL  Rashel Lane  10/16/2024   Date of Admission:  10/15/2024    The patient's chart is reviewed and the patient is discussed with the staff.    Subjective:     Infomration per chart    Patient is a 74 y.o. male with HTN/non-compliance/ frequent falls per ER visits due to ETOH/ came in s/p fall at home. Weakness and constipation per history. CT noted to constipation with low BP. Started on ABX since WBC up at 16.7 and +UA with renal stones non-obstructing and gall stones with no cholecystitis. LA was elevated at 4.6 but dropped to 2.6 per notes. So admitted for Sepsis.    Per meds ?CAD since on plavix and ?AFIB on xarelto with ?DM, etc.     Today called to see patient since BP in 80's. Had large drop in hg to 5.4 from 11.4 yesterday. Patient in room in unresponsive and no access and PICC team is placing. Currently not awake or articulate but is able to squeeze my hand on command and was able to move his right toe.     Patient going to ICU and called to see if can assist with care.     No reported hemoptysis/hematemesis/BRBPR/etc.     Review of Systems:  Unable to obtain due to patient factors.     Current Outpatient Medications   Medication Instructions    amLODIPine (NORVASC) 5 MG tablet 1 tablet, Oral, DAILY    atorvastatin (LIPITOR) 80 mg, Oral    clopidogrel (PLAVIX) 75 mg, Oral, DAILY    docusate (COLACE, DULCOLAX) 100 mg, Oral, 2 TIMES DAILY    gabapentin (NEURONTIN) 400 mg, Oral, 3 TIMES DAILY    ketoconazole (NIZORAL) 2 % cream Topical, DAILY    lisinopril (PRINIVIL;ZESTRIL) 5 MG tablet 1 tablet, Oral, DAILY    losartan (COZAAR) 50 MG tablet TAKE 1 TABLET BY MOUTH EVERY DAY    metFORMIN (GLUCOPHAGE) 500 mg, Oral, 2 TIMES DAILY    metoprolol (LOPRESSOR) 100 mg, Oral, 2 TIMES DAILY    naproxen (NAPROSYN) 250 mg, Oral, 2 TIMES DAILY PRN    nitroGLYCERIN (NITROSTAT) 0.4 mg, SubLINGual    polyethylene glycol (GLYCOLAX) 17 g, 
Rashel Lane is 74 y.o. y/o male here for screening colonoscopy.    No immediate (parents/siblings) FH of colon cancer, no acute symptoms.     Past Medical History:   Diagnosis Date    Anxiety     Dermatophytosis of nail     Diabetes (HCC) 9/12/2014    Diabetic polyneuropathy (HCC)     Heart murmur     AORTIC SYSTOLIC FLOW MURMUR, 2013 [facility]    Hepatic encephalopathy (HCC) 5/5/2014    Hyperlipidemia     Hypertension     Metatarsal stress fracture     Metatarsalgia     Osteoarthritis     Peripheral edema     Psychiatric disorder     depression    UTI (urinary tract infection)      Past Surgical History:   Procedure Laterality Date    HERNIA REPAIR  2013    LAPAROTOMY  2012    ABDOMINAL SURGERY     NEUROLOGICAL SURGERY      plate in  head..    OTHER SURGICAL HISTORY      cyst removed from back x 1 week    UPPER GASTROINTESTINAL ENDOSCOPY N/A 10/17/2024    ESOPHAGOGASTRODUODENOSCOPY / GOLD PROBE / EPI performed by Devin August DO at [facility] ENDOSCOPY     Family History   Problem Relation Age of Onset    Colon Cancer Mother     Hypertension Sister     Stroke Father      Social History     Tobacco Use    Smoking status: Some Days    Smokeless tobacco: Never    Tobacco comments:     Quit smoking: LIGHT TOBACCO SMOKER 1-2 A DAY   Substance Use Topics    Alcohol use: No     Alcohol/week: 0.0 standard drinks of alcohol    Drug use: No     Allergies   Allergen Reactions    Penicillins Itching     *PCN-200, ASSORTED CLASSES*     Current Outpatient Medications   Medication Instructions    amLODIPine (NORVASC) 5 MG tablet 1 tablet, Oral, DAILY    atorvastatin (LIPITOR) 80 mg, Oral    clopidogrel (PLAVIX) 75 mg, Oral, DAILY    docusate (COLACE, DULCOLAX) 100 mg, Oral, 2 TIMES DAILY    gabapentin (NEURONTIN) 400 mg, Oral, 3 TIMES DAILY    ketoconazole (NIZORAL) 2 % cream Topical, DAILY    lisinopril (PRINIVIL;ZESTRIL) 5 MG tablet 1 tablet, Oral, DAILY    losartan (COZAAR) 50 MG tablet TAKE 1 TABLET BY MOUTH EVERY DAY 
DAILY    nitroGLYCERIN (NITROSTAT) 0.4 mg, SubLINGual    polyethylene glycol (GLYCOLAX) 17 g, Oral, DAILY    raNITIdine (ZANTAC) 150 mg, Oral, 2 TIMES DAILY    rivaroxaban (XARELTO) 15 mg, Oral, DAILY WITH DINNER    traMADol (ULTRAM) 50 mg, Oral, EVERY 6 HOURS PRN       I have personally reviewed labs and tests:  Recent Results (from the past 24 hour(s))   EKG 12 Lead    Collection Time: 10/15/24  2:17 AM   Result Value Ref Range    Ventricular Rate 106 BPM    Atrial Rate 108 BPM    P-R Interval 156 ms    QRS Duration 126 ms    Q-T Interval 379 ms    QTc Calculation (Bazett) 504 ms    P Axis 64 degrees    R Axis 77 degrees    T Axis 12 degrees    Diagnosis       !!! Poor data quality, interpretation may be adversely affected  Sinus tachycardia  Consider right atrial enlargement  Right bundle branch block  Non-specific ST-t wave changes    Confirmed by ZACH JOHNSON (), IGOR ENGLE ([id number]) on 10/15/2024 9:19:05 AM     Comprehensive Metabolic Panel    Collection Time: 10/15/24  2:30 AM   Result Value Ref Range    Sodium 143 136 - 145 mmol/L    Potassium 3.7 3.5 - 5.1 mmol/L    Chloride 98 98 - 107 mmol/L    CO2 21 20 - 28 mmol/L    Anion Gap 25 (H) 9 - 18 mmol/L    Glucose 179 (H) 70 - 99 mg/dL    BUN 42 (H) 8 - 23 MG/DL    Creatinine 1.95 (H) 0.80 - 1.30 MG/DL    Est, Glom Filt Rate 36 (L) >60 ml/min/1.73m2    Calcium 9.4 8.8 - 10.2 MG/DL    Total Bilirubin 0.7 0.0 - 1.2 MG/DL    ALT 9 8 - 55 U/L    AST 20 15 - 37 U/L    Alk Phosphatase 75 40 - 129 U/L    Total Protein 6.6 6.3 - 8.2 g/dL    Albumin 3.4 3.2 - 4.6 g/dL    Globulin 3.2 2.3 - 3.5 g/dL    Albumin/Globulin Ratio 1.0 1.0 - 1.9     CBC with Auto Differential    Collection Time: 10/15/24  2:30 AM   Result Value Ref Range    WBC 16.7 (H) 4.3 - 11.1 K/uL    RBC 3.54 (L) 4.23 - 5.6 M/uL    Hemoglobin 11.4 (L) 13.6 - 17.2 g/dL    Hematocrit 35.5 (L) 41.1 - 50.3 %    .3 82 - 102 FL    MCH 32.2 26.1 - 32.9 PG    MCHC 32.1 31.4 - 35.0 g/dL    RDW 13.4 11.9 -

## 2024-10-28 NOTE — CONSULTS
Palliative Care    Patient: Rashle Lane MRN: [id number]  SSN: [id number]    YOB: 1950  Age: 74 y.o.  Sex: male       Date of Request: 10/25/2024  Date of Consult:  10/28/2024  Reason for Consult:  goals of care  Requesting Physician: Dr Mckeon     Assessment/Plan:     Principal Diagnosis:    Altered Mental Status R41.82    Additional Diagnoses:   Debility, Unspecified  R53.81  Dysphagia  R13.10  Frailty  R54  Encounter for Palliative Care  Z51.5    Palliative Performance Scale (PPS):       Medical Decision Making:   Reviewed and summarized notes from admission to present   Discussed case with appropriate providers  Reviewed laboratory and x-ray data from admission to present     Pt poorly responsive, restless at times.  Daughter and pt's girlfriend at bedside.  Introduced role of PC and reviewed events.  They both have good understanding of pt's condition, and poor prognosis.  They are hoping the EGD will provide answers about his Hgb.  Family denied questions at this time. Assured them of our ongoing care.  Will follow up tomorrow after procedure, and assist with plan as needed.  We will continue to follow.     Will discuss findings with members of the interdisciplinary team.      Thank you for this referral.         .    Subjective:     History obtained from:  Family and Chart    Chief Complaint: BI bleed, CVA, Aspiration PNA   History of Present Illness:  Mr Lane is a 73 yo male with PMH of DM, HLD, HTN, depression, anxiety, and other conditions listed below, who presented to the ER from home on 10/15/2024 with c/o a syncopal episode.  Pt denied chest pain, dyspnea, n/v/d.  EMS noted pt was hypotensive.  Work up in the ER was notable for tachycardia, hypotension, and leukocytosis. Pt had a large melanotic stool in the ER, and it was noted that he had a large drop in Hgb. Pt was admitted for further management.  He was evaluated by GI, and underwent upper endoscopy, which revealed esophagitis,

## 2024-10-28 NOTE — ICUWATCH
RRT Clinical Rounding Nurse Progress Report      SUBJECTIVE: Called to assess patient secondary to RN/provider concern - drop in hemoglobin .      Vitals:    10/27/24 1700 10/27/24 1730 10/27/24 1756 10/27/24 1957   BP: (!) 104/58 96/62 103/60 139/73   Pulse: 100 (!) 101 89 100   Resp: 18 16 16 19   Temp:  98.6 °F (37 °C) 98.1 °F (36.7 °C) 98.6 °F (37 °C)   TempSrc:  Oral Oral Axillary   SpO2: 99% 99% 100% 100%   Weight:       Height:              ASSESSMENT:  On arrival to room, I found patient to be resting in bed quietly. Patient is mildly restless on entry to the room. Patient is oriented X 1 to person. Respirations even and unlabored. Bilateral lung sounds clear on RA. NGT in place. 2nd unit of PRBCs initiated at this time. No needs or concerns expressed at this time.    PLAN:  Recent labs/notes/vitals reviewed, and patient discussed with primary RN. No concern per primary RN. Will follow per RRT Clinical Rounding Program protocol. Primary RN to call with concerns.    Hu Tong RN  [place]: [phone]

## 2024-10-28 NOTE — CONSULTS
Comprehensive Nutrition Assessment    Type and Reason for Visit: Reassess  Malnutrition Screening Tool: Malnutrition Screen  Have you recently lost weight without trying?: 2 to 13 pounds (1 point)  Have you been eating poorly because of a decreased appetite?: Yes (1 point)  Malnutrition Screening Tool Score: 2  General Nutrition Management: Hospitalist    Nutrition Recommendations/Plan:   Meals and Snacks:  Diet:  Continue NPO  Enteral Nutrition:   Enteral Access: Nasogastric given length of tube outside of nose presumed NE placement  Initiate once FT placement confirmed  Formula: Standard without Fiber (Osmolite 1.2 Davis)  Goal Rate: Continuous 65 ml/hr  Initiate  Water flush  115 ml every 4 hours  Modulars: None not indicated at this time   Enteral regimen at above goal to provide per 24 hours:  1716 calories, 79 grams protein and 1863 ml free fluid.    Above regimen: Intended to meet macronutrient goals Increased FWF provisions d/t hypernatremia  IV Fluids:  Continue per provider order  Labs:   EN labs: BMP daily, Mg daily x 7 days at initiation then MWF and Phos daily x 7 days at initiation then MWF.     POC Glucoses/SSI Not indicated  Nutrition Related Medication Management:  Electrolyte Replacement Protocol PRN Continue for Magnesium and Potassium      Phos will be addressed individually due to national shortages  Thiamine Active per MD  Bowel Regimen  Active per MD     Malnutrition Assessment:  Malnutrition Status: Moderate malnutrition  Context: Chronic Illness  Findings of clinical characteristics of malnutrition:   Energy Intake:  Unable to assess  Weight Loss:  Greater than 10% over 6 months (14.6% in 4 months)     Body Fat Loss:  No significant body fat loss     Muscle Mass Loss:  Mild muscle mass loss Temples (temporalis), Clavicles (pectoralis & deltoids), Calf (gastrocnemius), Hand (interosseous)  Fluid Accumulation:  No significant fluid accumulation     Strength:  Normal  strength

## 2024-10-29 LAB
ABO + RH BLD: NORMAL
ANION GAP SERPL CALC-SCNC: 10 MMOL/L (ref 7–16)
BASOPHILS # BLD: 0 K/UL (ref 0–0.2)
BASOPHILS NFR BLD: 0 % (ref 0–2)
BLD PROD TYP BPU: NORMAL
BLOOD BANK BLOOD PRODUCT EXPIRATION DATE: NORMAL
BLOOD BANK DISPENSE STATUS: NORMAL
BLOOD BANK ISBT PRODUCT BLOOD TYPE: 7300
BLOOD BANK PRODUCT CODE: NORMAL
BLOOD BANK UNIT TYPE AND RH: NORMAL
BLOOD GROUP ANTIBODIES SERPL: NORMAL
BPU ID: NORMAL
BUN SERPL-MCNC: 19 MG/DL (ref 8–23)
CALCIUM SERPL-MCNC: 7.7 MG/DL (ref 8.8–10.2)
CHLORIDE SERPL-SCNC: 119 MMOL/L (ref 98–107)
CO2 SERPL-SCNC: 18 MMOL/L (ref 20–29)
CREAT SERPL-MCNC: 1.3 MG/DL (ref 0.8–1.3)
CROSSMATCH RESULT: NORMAL
DIFFERENTIAL METHOD BLD: ABNORMAL
EOSINOPHIL # BLD: 0.1 K/UL (ref 0–0.8)
EOSINOPHIL NFR BLD: 1 % (ref 0.5–7.8)
ERYTHROCYTE [DISTWIDTH] IN BLOOD BY AUTOMATED COUNT: 20.7 % (ref 11.9–14.6)
GLUCOSE SERPL-MCNC: 101 MG/DL (ref 70–99)
HCT VFR BLD AUTO: 27.7 % (ref 41.1–50.3)
HCT VFR BLD AUTO: 28.3 % (ref 41.1–50.3)
HCT VFR BLD AUTO: 29.4 % (ref 41.1–50.3)
HCT VFR BLD AUTO: 29.7 % (ref 41.1–50.3)
HGB BLD-MCNC: 8.9 G/DL (ref 13.6–17.2)
HGB BLD-MCNC: 8.9 G/DL (ref 13.6–17.2)
HGB BLD-MCNC: 9.1 G/DL (ref 13.6–17.2)
HGB BLD-MCNC: 9.4 G/DL (ref 13.6–17.2)
IMM GRANULOCYTES # BLD AUTO: 0 K/UL (ref 0–0.5)
IMM GRANULOCYTES NFR BLD AUTO: 0 % (ref 0–5)
LYMPHOCYTES # BLD: 1.2 K/UL (ref 0.5–4.6)
LYMPHOCYTES NFR BLD: 12 % (ref 13–44)
MAGNESIUM SERPL-MCNC: 2.4 MG/DL (ref 1.8–2.4)
MCH RBC QN AUTO: 31 PG (ref 26.1–32.9)
MCHC RBC AUTO-ENTMCNC: 32.1 G/DL (ref 31.4–35)
MCV RBC AUTO: 96.5 FL (ref 82–102)
MONOCYTES # BLD: 0.6 K/UL (ref 0.1–1.3)
MONOCYTES NFR BLD: 6 % (ref 4–12)
NEUTS SEG # BLD: 7.6 K/UL (ref 1.7–8.2)
NEUTS SEG NFR BLD: 80 % (ref 43–78)
NRBC # BLD: 0 K/UL (ref 0–0.2)
PHOSPHATE SERPL-MCNC: 3 MG/DL (ref 2.5–4.5)
PLATELET # BLD AUTO: 168 K/UL (ref 150–450)
PMV BLD AUTO: 10.5 FL (ref 9.4–12.3)
POTASSIUM SERPL-SCNC: 4.6 MMOL/L (ref 3.5–5.1)
RBC # BLD AUTO: 2.87 M/UL (ref 4.23–5.6)
SODIUM SERPL-SCNC: 147 MMOL/L (ref 136–145)
SPECIMEN EXP DATE BLD: NORMAL
UNIT DIVISION: 0
UNIT ISSUE DATE/TIME: NORMAL
WBC # BLD AUTO: 9.6 K/UL (ref 4.3–11.1)

## 2024-10-29 PROCEDURE — 99233 SBSQ HOSP IP/OBS HIGH 50: CPT | Performed by: NURSE PRACTITIONER

## 2024-10-29 PROCEDURE — 1100000003 HC PRIVATE W/ TELEMETRY

## 2024-10-29 PROCEDURE — 83735 ASSAY OF MAGNESIUM: CPT

## 2024-10-29 PROCEDURE — 84100 ASSAY OF PHOSPHORUS: CPT

## 2024-10-29 PROCEDURE — 85018 HEMOGLOBIN: CPT

## 2024-10-29 PROCEDURE — 85014 HEMATOCRIT: CPT

## 2024-10-29 PROCEDURE — 99232 SBSQ HOSP IP/OBS MODERATE 35: CPT | Performed by: STUDENT IN AN ORGANIZED HEALTH CARE EDUCATION/TRAINING PROGRAM

## 2024-10-29 PROCEDURE — 97530 THERAPEUTIC ACTIVITIES: CPT

## 2024-10-29 PROCEDURE — 92526 ORAL FUNCTION THERAPY: CPT

## 2024-10-29 PROCEDURE — 6360000002 HC RX W HCPCS

## 2024-10-29 PROCEDURE — 6360000002 HC RX W HCPCS: Performed by: FAMILY MEDICINE

## 2024-10-29 PROCEDURE — 6370000000 HC RX 637 (ALT 250 FOR IP): Performed by: FAMILY MEDICINE

## 2024-10-29 PROCEDURE — 85025 COMPLETE CBC W/AUTO DIFF WBC: CPT

## 2024-10-29 PROCEDURE — 97168 OT RE-EVAL EST PLAN CARE: CPT

## 2024-10-29 PROCEDURE — 36415 COLL VENOUS BLD VENIPUNCTURE: CPT

## 2024-10-29 PROCEDURE — 80048 BASIC METABOLIC PNL TOTAL CA: CPT

## 2024-10-29 PROCEDURE — 97164 PT RE-EVAL EST PLAN CARE: CPT

## 2024-10-29 PROCEDURE — 2580000003 HC RX 258: Performed by: FAMILY MEDICINE

## 2024-10-29 PROCEDURE — 97112 NEUROMUSCULAR REEDUCATION: CPT

## 2024-10-29 RX ORDER — THIAMINE HYDROCHLORIDE 100 MG/ML
100 INJECTION, SOLUTION INTRAMUSCULAR; INTRAVENOUS DAILY
Status: COMPLETED | OUTPATIENT
Start: 2024-10-29 | End: 2024-11-04

## 2024-10-29 RX ADMIN — THIAMINE HYDROCHLORIDE 100 MG: 100 INJECTION, SOLUTION INTRAMUSCULAR; INTRAVENOUS at 16:14

## 2024-10-29 RX ADMIN — AMLODIPINE BESYLATE 5 MG: 5 TABLET ORAL at 08:53

## 2024-10-29 RX ADMIN — ATORVASTATIN CALCIUM 80 MG: 40 TABLET, FILM COATED ORAL at 08:53

## 2024-10-29 RX ADMIN — WATER 1000 MG: 1 INJECTION INTRAMUSCULAR; INTRAVENOUS; SUBCUTANEOUS at 11:16

## 2024-10-29 RX ADMIN — METRONIDAZOLE 500 MG: 500 INJECTION, SOLUTION INTRAVENOUS at 11:16

## 2024-10-29 RX ADMIN — METOPROLOL TARTRATE 100 MG: 50 TABLET, FILM COATED ORAL at 08:53

## 2024-10-29 RX ADMIN — SODIUM CHLORIDE, PRESERVATIVE FREE 40 MG: 5 INJECTION INTRAVENOUS at 14:39

## 2024-10-29 RX ADMIN — METRONIDAZOLE 500 MG: 500 INJECTION, SOLUTION INTRAVENOUS at 03:19

## 2024-10-29 RX ADMIN — SODIUM CHLORIDE, PRESERVATIVE FREE 40 MG: 5 INJECTION INTRAVENOUS at 02:24

## 2024-10-29 RX ADMIN — METRONIDAZOLE 500 MG: 500 INJECTION, SOLUTION INTRAVENOUS at 18:17

## 2024-10-29 RX ADMIN — SENNOSIDES 8.6 MG: 8.6 TABLET, FILM COATED ORAL at 08:53

## 2024-10-29 NOTE — CARE COORDINATION
Daughter spoke with Palliative care this am and elected to pursue short term rehab. Patient currently with [facility] due to dysphagia. EGD yesterday showed esophagitis and gastric ulcer. Palliative care addressed if dysphagia does not improve a peg tube will román to be placed before transferring to short term rehab. Therapy has been re-consulted to evaluate. CM will re send referral when patient is more medically appropriate.     Albina HOUSER, ACM  [place]

## 2024-10-30 ENCOUNTER — APPOINTMENT (OUTPATIENT)
Dept: GENERAL RADIOLOGY | Age: 74
DRG: 871 | End: 2024-10-30
Payer: MEDICARE

## 2024-10-30 LAB
ANION GAP SERPL CALC-SCNC: 7 MMOL/L (ref 7–16)
BUN SERPL-MCNC: 18 MG/DL (ref 8–23)
CALCIUM SERPL-MCNC: 7.5 MG/DL (ref 8.8–10.2)
CHLORIDE SERPL-SCNC: 118 MMOL/L (ref 98–107)
CO2 SERPL-SCNC: 21 MMOL/L (ref 20–29)
CREAT SERPL-MCNC: 1.15 MG/DL (ref 0.8–1.3)
GLUCOSE SERPL-MCNC: 125 MG/DL (ref 70–99)
HCT VFR BLD AUTO: 27.7 % (ref 41.1–50.3)
HCT VFR BLD AUTO: 28.4 % (ref 41.1–50.3)
HCT VFR BLD AUTO: 28.7 % (ref 41.1–50.3)
HGB BLD-MCNC: 8.7 G/DL (ref 13.6–17.2)
HGB BLD-MCNC: 8.8 G/DL (ref 13.6–17.2)
HGB BLD-MCNC: 9.1 G/DL (ref 13.6–17.2)
MAGNESIUM SERPL-MCNC: 2.2 MG/DL (ref 1.8–2.4)
PHOSPHATE SERPL-MCNC: 2.7 MG/DL (ref 2.5–4.5)
POTASSIUM SERPL-SCNC: 4.2 MMOL/L (ref 3.5–5.1)
SODIUM SERPL-SCNC: 146 MMOL/L (ref 136–145)

## 2024-10-30 PROCEDURE — 2580000003 HC RX 258: Performed by: ANESTHESIOLOGY

## 2024-10-30 PROCEDURE — 36415 COLL VENOUS BLD VENIPUNCTURE: CPT

## 2024-10-30 PROCEDURE — 6370000000 HC RX 637 (ALT 250 FOR IP): Performed by: FAMILY MEDICINE

## 2024-10-30 PROCEDURE — 85014 HEMATOCRIT: CPT

## 2024-10-30 PROCEDURE — 1100000003 HC PRIVATE W/ TELEMETRY

## 2024-10-30 PROCEDURE — 92526 ORAL FUNCTION THERAPY: CPT

## 2024-10-30 PROCEDURE — 6360000002 HC RX W HCPCS

## 2024-10-30 PROCEDURE — 2580000003 HC RX 258: Performed by: FAMILY MEDICINE

## 2024-10-30 PROCEDURE — 6360000002 HC RX W HCPCS: Performed by: FAMILY MEDICINE

## 2024-10-30 PROCEDURE — 85018 HEMOGLOBIN: CPT

## 2024-10-30 PROCEDURE — 92523 SPEECH SOUND LANG COMPREHEN: CPT

## 2024-10-30 PROCEDURE — 83735 ASSAY OF MAGNESIUM: CPT

## 2024-10-30 PROCEDURE — 80048 BASIC METABOLIC PNL TOTAL CA: CPT

## 2024-10-30 PROCEDURE — 84100 ASSAY OF PHOSPHORUS: CPT

## 2024-10-30 RX ADMIN — METRONIDAZOLE 500 MG: 500 INJECTION, SOLUTION INTRAVENOUS at 03:30

## 2024-10-30 RX ADMIN — ATORVASTATIN CALCIUM 80 MG: 40 TABLET, FILM COATED ORAL at 08:34

## 2024-10-30 RX ADMIN — SODIUM CHLORIDE, PRESERVATIVE FREE 40 MG: 5 INJECTION INTRAVENOUS at 13:49

## 2024-10-30 RX ADMIN — THIAMINE HYDROCHLORIDE 100 MG: 100 INJECTION, SOLUTION INTRAMUSCULAR; INTRAVENOUS at 08:34

## 2024-10-30 RX ADMIN — SODIUM CHLORIDE, PRESERVATIVE FREE 10 ML: 5 INJECTION INTRAVENOUS at 08:34

## 2024-10-30 RX ADMIN — SODIUM CHLORIDE: 9 INJECTION, SOLUTION INTRAVENOUS at 19:52

## 2024-10-30 RX ADMIN — WATER 1000 MG: 1 INJECTION INTRAMUSCULAR; INTRAVENOUS; SUBCUTANEOUS at 11:09

## 2024-10-30 RX ADMIN — METRONIDAZOLE 500 MG: 500 INJECTION, SOLUTION INTRAVENOUS at 19:53

## 2024-10-30 RX ADMIN — METRONIDAZOLE 500 MG: 500 INJECTION, SOLUTION INTRAVENOUS at 11:14

## 2024-10-30 RX ADMIN — SODIUM CHLORIDE, PRESERVATIVE FREE 40 MG: 5 INJECTION INTRAVENOUS at 01:55

## 2024-10-30 RX ADMIN — SODIUM CHLORIDE: 9 INJECTION, SOLUTION INTRAVENOUS at 03:29

## 2024-10-30 ASSESSMENT — PAIN SCALES - GENERAL: PAINLEVEL_OUTOF10: 0

## 2024-10-30 NOTE — CARE COORDINATION
Chart reviewed and patient discussed in IDT rounds this AM. Patient oriented to name only. Patient in bilateral mitts. Patient scheduled for  MBSS to assess swallow function, but patient refused. Patient still with NG. STR is recommended, and family is wanting to purse STR. Referral to be sent once patient is medically appropriate.     Albina AYALA, ACM  [place]

## 2024-10-31 ENCOUNTER — APPOINTMENT (OUTPATIENT)
Dept: GENERAL RADIOLOGY | Age: 74
DRG: 871 | End: 2024-10-31
Payer: MEDICARE

## 2024-10-31 LAB
ANION GAP SERPL CALC-SCNC: 6 MMOL/L (ref 7–16)
BACTERIA SPEC CULT: NORMAL
BACTERIA SPEC CULT: NORMAL
BASOPHILS # BLD: 0 K/UL (ref 0–0.2)
BASOPHILS NFR BLD: 0 % (ref 0–2)
BUN SERPL-MCNC: 15 MG/DL (ref 8–23)
CALCIUM SERPL-MCNC: 7.4 MG/DL (ref 8.8–10.2)
CHLORIDE SERPL-SCNC: 114 MMOL/L (ref 98–107)
CO2 SERPL-SCNC: 22 MMOL/L (ref 20–29)
CREAT SERPL-MCNC: 0.95 MG/DL (ref 0.8–1.3)
DIFFERENTIAL METHOD BLD: ABNORMAL
EOSINOPHIL # BLD: 0.1 K/UL (ref 0–0.8)
EOSINOPHIL NFR BLD: 1 % (ref 0.5–7.8)
ERYTHROCYTE [DISTWIDTH] IN BLOOD BY AUTOMATED COUNT: 19.9 % (ref 11.9–14.6)
GLUCOSE SERPL-MCNC: 136 MG/DL (ref 70–99)
HCT VFR BLD AUTO: 27.7 % (ref 41.1–50.3)
HCT VFR BLD AUTO: 29.9 % (ref 41.1–50.3)
HGB BLD-MCNC: 8.8 G/DL (ref 13.6–17.2)
HGB BLD-MCNC: 9.6 G/DL (ref 13.6–17.2)
IMM GRANULOCYTES # BLD AUTO: 0 K/UL (ref 0–0.5)
IMM GRANULOCYTES NFR BLD AUTO: 1 % (ref 0–5)
LYMPHOCYTES # BLD: 1.1 K/UL (ref 0.5–4.6)
LYMPHOCYTES NFR BLD: 13 % (ref 13–44)
MAGNESIUM SERPL-MCNC: 1.9 MG/DL (ref 1.8–2.4)
MCH RBC QN AUTO: 30.3 PG (ref 26.1–32.9)
MCHC RBC AUTO-ENTMCNC: 31.8 G/DL (ref 31.4–35)
MCV RBC AUTO: 95.5 FL (ref 82–102)
MONOCYTES # BLD: 0.7 K/UL (ref 0.1–1.3)
MONOCYTES NFR BLD: 8 % (ref 4–12)
NEUTS SEG # BLD: 6.8 K/UL (ref 1.7–8.2)
NEUTS SEG NFR BLD: 77 % (ref 43–78)
NRBC # BLD: 0 K/UL (ref 0–0.2)
PHOSPHATE SERPL-MCNC: 2.4 MG/DL (ref 2.5–4.5)
PLATELET # BLD AUTO: 150 K/UL (ref 150–450)
PMV BLD AUTO: 10 FL (ref 9.4–12.3)
POTASSIUM SERPL-SCNC: 4.2 MMOL/L (ref 3.5–5.1)
RBC # BLD AUTO: 2.9 M/UL (ref 4.23–5.6)
SERVICE CMNT-IMP: NORMAL
SERVICE CMNT-IMP: NORMAL
SODIUM SERPL-SCNC: 141 MMOL/L (ref 136–145)
WBC # BLD AUTO: 8.7 K/UL (ref 4.3–11.1)

## 2024-10-31 PROCEDURE — 6370000000 HC RX 637 (ALT 250 FOR IP): Performed by: FAMILY MEDICINE

## 2024-10-31 PROCEDURE — 97530 THERAPEUTIC ACTIVITIES: CPT

## 2024-10-31 PROCEDURE — 36415 COLL VENOUS BLD VENIPUNCTURE: CPT

## 2024-10-31 PROCEDURE — 84100 ASSAY OF PHOSPHORUS: CPT

## 2024-10-31 PROCEDURE — 6360000002 HC RX W HCPCS

## 2024-10-31 PROCEDURE — 74230 X-RAY XM SWLNG FUNCJ C+: CPT

## 2024-10-31 PROCEDURE — 85025 COMPLETE CBC W/AUTO DIFF WBC: CPT

## 2024-10-31 PROCEDURE — 85014 HEMATOCRIT: CPT

## 2024-10-31 PROCEDURE — 92611 MOTION FLUOROSCOPY/SWALLOW: CPT

## 2024-10-31 PROCEDURE — 97112 NEUROMUSCULAR REEDUCATION: CPT

## 2024-10-31 PROCEDURE — 6360000002 HC RX W HCPCS: Performed by: FAMILY MEDICINE

## 2024-10-31 PROCEDURE — 83735 ASSAY OF MAGNESIUM: CPT

## 2024-10-31 PROCEDURE — 2580000003 HC RX 258: Performed by: FAMILY MEDICINE

## 2024-10-31 PROCEDURE — 85018 HEMOGLOBIN: CPT

## 2024-10-31 PROCEDURE — 1100000000 HC RM PRIVATE

## 2024-10-31 PROCEDURE — 2500000003 HC RX 250 WO HCPCS

## 2024-10-31 PROCEDURE — 2580000003 HC RX 258: Performed by: ANESTHESIOLOGY

## 2024-10-31 PROCEDURE — 97535 SELF CARE MNGMENT TRAINING: CPT

## 2024-10-31 PROCEDURE — 80048 BASIC METABOLIC PNL TOTAL CA: CPT

## 2024-10-31 RX ADMIN — SODIUM CHLORIDE, PRESERVATIVE FREE 10 ML: 5 INJECTION INTRAVENOUS at 10:32

## 2024-10-31 RX ADMIN — THIAMINE HYDROCHLORIDE 100 MG: 100 INJECTION, SOLUTION INTRAMUSCULAR; INTRAVENOUS at 10:31

## 2024-10-31 RX ADMIN — SENNOSIDES 8.6 MG: 8.6 TABLET, FILM COATED ORAL at 20:55

## 2024-10-31 RX ADMIN — WATER 1000 MG: 1 INJECTION INTRAMUSCULAR; INTRAVENOUS; SUBCUTANEOUS at 10:50

## 2024-10-31 RX ADMIN — SODIUM CHLORIDE, PRESERVATIVE FREE 10 ML: 5 INJECTION INTRAVENOUS at 20:55

## 2024-10-31 RX ADMIN — ATORVASTATIN CALCIUM 80 MG: 40 TABLET, FILM COATED ORAL at 10:31

## 2024-10-31 RX ADMIN — SODIUM CHLORIDE, PRESERVATIVE FREE 40 MG: 5 INJECTION INTRAVENOUS at 03:33

## 2024-10-31 RX ADMIN — SODIUM CHLORIDE: 9 INJECTION, SOLUTION INTRAVENOUS at 19:06

## 2024-10-31 RX ADMIN — BARIUM SULFATE 45 ML: 0.81 POWDER, FOR SUSPENSION ORAL at 11:19

## 2024-10-31 RX ADMIN — SODIUM CHLORIDE: 9 INJECTION, SOLUTION INTRAVENOUS at 03:35

## 2024-10-31 RX ADMIN — METRONIDAZOLE 500 MG: 500 INJECTION, SOLUTION INTRAVENOUS at 11:38

## 2024-10-31 RX ADMIN — METRONIDAZOLE 500 MG: 500 INJECTION, SOLUTION INTRAVENOUS at 19:06

## 2024-10-31 RX ADMIN — BARIUM SULFATE 15 ML: 400 PASTE ORAL at 11:19

## 2024-10-31 RX ADMIN — METRONIDAZOLE 500 MG: 500 INJECTION, SOLUTION INTRAVENOUS at 03:35

## 2024-10-31 RX ADMIN — METOPROLOL TARTRATE 100 MG: 50 TABLET, FILM COATED ORAL at 20:54

## 2024-10-31 RX ADMIN — SODIUM CHLORIDE, PRESERVATIVE FREE 40 MG: 5 INJECTION INTRAVENOUS at 17:02

## 2024-10-31 NOTE — CARE COORDINATION
Chart reviewed and patient discussed in IDT rounds this AM. Patient completed MBSS this morning. Patient working with speech on advancing diet.     Albina HOUSER, ACM  [place]

## 2024-11-01 LAB
ANION GAP SERPL CALC-SCNC: 6 MMOL/L (ref 7–16)
BASOPHILS # BLD: 0 K/UL (ref 0–0.2)
BASOPHILS NFR BLD: 0 % (ref 0–2)
BUN SERPL-MCNC: 12 MG/DL (ref 8–23)
CALCIUM SERPL-MCNC: 7.5 MG/DL (ref 8.8–10.2)
CHLORIDE SERPL-SCNC: 110 MMOL/L (ref 98–107)
CO2 SERPL-SCNC: 22 MMOL/L (ref 20–29)
CREAT SERPL-MCNC: 0.92 MG/DL (ref 0.8–1.3)
DIFFERENTIAL METHOD BLD: ABNORMAL
EOSINOPHIL # BLD: 0.1 K/UL (ref 0–0.8)
EOSINOPHIL NFR BLD: 1 % (ref 0.5–7.8)
ERYTHROCYTE [DISTWIDTH] IN BLOOD BY AUTOMATED COUNT: 19.2 % (ref 11.9–14.6)
GLUCOSE SERPL-MCNC: 96 MG/DL (ref 70–99)
HCT VFR BLD AUTO: 27.8 % (ref 41.1–50.3)
HGB BLD-MCNC: 8.8 G/DL (ref 13.6–17.2)
IMM GRANULOCYTES # BLD AUTO: 0.1 K/UL (ref 0–0.5)
IMM GRANULOCYTES NFR BLD AUTO: 1 % (ref 0–5)
LYMPHOCYTES # BLD: 1 K/UL (ref 0.5–4.6)
LYMPHOCYTES NFR BLD: 12 % (ref 13–44)
MAGNESIUM SERPL-MCNC: 1.9 MG/DL (ref 1.8–2.4)
MCH RBC QN AUTO: 30.7 PG (ref 26.1–32.9)
MCHC RBC AUTO-ENTMCNC: 31.7 G/DL (ref 31.4–35)
MCV RBC AUTO: 96.9 FL (ref 82–102)
MONOCYTES # BLD: 0.8 K/UL (ref 0.1–1.3)
MONOCYTES NFR BLD: 10 % (ref 4–12)
NEUTS SEG # BLD: 6.5 K/UL (ref 1.7–8.2)
NEUTS SEG NFR BLD: 76 % (ref 43–78)
NRBC # BLD: 0 K/UL (ref 0–0.2)
PHOSPHATE SERPL-MCNC: 2.6 MG/DL (ref 2.5–4.5)
PLATELET # BLD AUTO: 164 K/UL (ref 150–450)
PMV BLD AUTO: 10.1 FL (ref 9.4–12.3)
POTASSIUM SERPL-SCNC: 4.1 MMOL/L (ref 3.5–5.1)
RBC # BLD AUTO: 2.87 M/UL (ref 4.23–5.6)
SODIUM SERPL-SCNC: 137 MMOL/L (ref 136–145)
WBC # BLD AUTO: 8.5 K/UL (ref 4.3–11.1)

## 2024-11-01 PROCEDURE — 6360000002 HC RX W HCPCS

## 2024-11-01 PROCEDURE — 85025 COMPLETE CBC W/AUTO DIFF WBC: CPT

## 2024-11-01 PROCEDURE — 2580000003 HC RX 258: Performed by: FAMILY MEDICINE

## 2024-11-01 PROCEDURE — 36415 COLL VENOUS BLD VENIPUNCTURE: CPT

## 2024-11-01 PROCEDURE — 1100000000 HC RM PRIVATE

## 2024-11-01 PROCEDURE — 83735 ASSAY OF MAGNESIUM: CPT

## 2024-11-01 PROCEDURE — 80048 BASIC METABOLIC PNL TOTAL CA: CPT

## 2024-11-01 PROCEDURE — 92526 ORAL FUNCTION THERAPY: CPT

## 2024-11-01 PROCEDURE — 6360000002 HC RX W HCPCS: Performed by: FAMILY MEDICINE

## 2024-11-01 PROCEDURE — 6370000000 HC RX 637 (ALT 250 FOR IP): Performed by: FAMILY MEDICINE

## 2024-11-01 PROCEDURE — 2580000003 HC RX 258: Performed by: ANESTHESIOLOGY

## 2024-11-01 PROCEDURE — 84100 ASSAY OF PHOSPHORUS: CPT

## 2024-11-01 PROCEDURE — 2580000003 HC RX 258

## 2024-11-01 RX ORDER — PANTOPRAZOLE SODIUM 40 MG/1
40 TABLET, DELAYED RELEASE ORAL
Status: DISCONTINUED | OUTPATIENT
Start: 2024-11-02 | End: 2024-11-05 | Stop reason: HOSPADM

## 2024-11-01 RX ADMIN — AMLODIPINE BESYLATE 5 MG: 5 TABLET ORAL at 08:36

## 2024-11-01 RX ADMIN — SODIUM CHLORIDE, PRESERVATIVE FREE 10 ML: 5 INJECTION INTRAVENOUS at 08:42

## 2024-11-01 RX ADMIN — SODIUM CHLORIDE: 9 INJECTION, SOLUTION INTRAVENOUS at 03:30

## 2024-11-01 RX ADMIN — SODIUM CHLORIDE, PRESERVATIVE FREE 40 MG: 5 INJECTION INTRAVENOUS at 15:33

## 2024-11-01 RX ADMIN — SODIUM CHLORIDE, PRESERVATIVE FREE 10 ML: 5 INJECTION INTRAVENOUS at 21:57

## 2024-11-01 RX ADMIN — THIAMINE HYDROCHLORIDE 100 MG: 100 INJECTION, SOLUTION INTRAMUSCULAR; INTRAVENOUS at 08:38

## 2024-11-01 RX ADMIN — METRONIDAZOLE 500 MG: 500 INJECTION, SOLUTION INTRAVENOUS at 03:31

## 2024-11-01 RX ADMIN — SODIUM CHLORIDE, PRESERVATIVE FREE 40 MG: 5 INJECTION INTRAVENOUS at 03:27

## 2024-11-01 RX ADMIN — SENNOSIDES 8.6 MG: 8.6 TABLET, FILM COATED ORAL at 08:38

## 2024-11-01 RX ADMIN — ATORVASTATIN CALCIUM 80 MG: 40 TABLET, FILM COATED ORAL at 08:37

## 2024-11-01 RX ADMIN — METOPROLOL TARTRATE 100 MG: 50 TABLET, FILM COATED ORAL at 08:37

## 2024-11-01 ASSESSMENT — PAIN SCALES - GENERAL
PAINLEVEL_OUTOF10: 0
PAINLEVEL_OUTOF10: 0

## 2024-11-01 NOTE — WOUND CARE
Consulted for Sacral wound.    Sacral wound likely mix of pressure/MASD; 10x6x0.2cm, pink/red, granular, subcutaneous, superficial skin sloughing away, small serosanguinous, no odor. Recommend incontinence care, zinc barrier cream BID/PRN, large pink silicone border foam dressing every other day/PRN. Continue frequent turning/repositioning.      Bilat heels intact, blanchable erythema. Continue to elevate heels with pillows.

## 2024-11-01 NOTE — CARE COORDINATION
CM reviewed pt chart for continued stay.  Update sent to [facility], they will have availability for pt first of week pending insurance precert approval. Updated daughter, Mary by phone.  She remains concerned about pt's nutrition as pt was approved for a modified diet yesterday but per daughter still has poor intake.  Will continue to follow pt plan of care and assist with transition to SNF rehab as appropriate.

## 2024-11-02 LAB
ANION GAP SERPL CALC-SCNC: 10 MMOL/L (ref 7–16)
BASOPHILS # BLD: 0 K/UL (ref 0–0.2)
BASOPHILS NFR BLD: 1 % (ref 0–2)
BUN SERPL-MCNC: 14 MG/DL (ref 8–23)
CALCIUM SERPL-MCNC: 8 MG/DL (ref 8.8–10.2)
CHLORIDE SERPL-SCNC: 106 MMOL/L (ref 98–107)
CO2 SERPL-SCNC: 20 MMOL/L (ref 20–29)
CREAT SERPL-MCNC: 1.06 MG/DL (ref 0.8–1.3)
DIFFERENTIAL METHOD BLD: ABNORMAL
EOSINOPHIL # BLD: 0.1 K/UL (ref 0–0.8)
EOSINOPHIL NFR BLD: 1 % (ref 0.5–7.8)
ERYTHROCYTE [DISTWIDTH] IN BLOOD BY AUTOMATED COUNT: 18.9 % (ref 11.9–14.6)
GLUCOSE SERPL-MCNC: 76 MG/DL (ref 70–99)
HCT VFR BLD AUTO: 32.2 % (ref 41.1–50.3)
HGB BLD-MCNC: 10.2 G/DL (ref 13.6–17.2)
IMM GRANULOCYTES # BLD AUTO: 0 K/UL (ref 0–0.5)
IMM GRANULOCYTES NFR BLD AUTO: 0 % (ref 0–5)
LYMPHOCYTES # BLD: 1.3 K/UL (ref 0.5–4.6)
LYMPHOCYTES NFR BLD: 17 % (ref 13–44)
MCH RBC QN AUTO: 30.7 PG (ref 26.1–32.9)
MCHC RBC AUTO-ENTMCNC: 31.7 G/DL (ref 31.4–35)
MCV RBC AUTO: 97 FL (ref 82–102)
MONOCYTES # BLD: 0.7 K/UL (ref 0.1–1.3)
MONOCYTES NFR BLD: 9 % (ref 4–12)
NEUTS SEG # BLD: 5.7 K/UL (ref 1.7–8.2)
NEUTS SEG NFR BLD: 72 % (ref 43–78)
NRBC # BLD: 0 K/UL (ref 0–0.2)
PLATELET # BLD AUTO: 213 K/UL (ref 150–450)
PMV BLD AUTO: 10.8 FL (ref 9.4–12.3)
POTASSIUM SERPL-SCNC: 4.3 MMOL/L (ref 3.5–5.1)
RBC # BLD AUTO: 3.32 M/UL (ref 4.23–5.6)
SODIUM SERPL-SCNC: 136 MMOL/L (ref 136–145)
WBC # BLD AUTO: 7.8 K/UL (ref 4.3–11.1)

## 2024-11-02 PROCEDURE — 80048 BASIC METABOLIC PNL TOTAL CA: CPT

## 2024-11-02 PROCEDURE — 6370000000 HC RX 637 (ALT 250 FOR IP): Performed by: FAMILY MEDICINE

## 2024-11-02 PROCEDURE — 1100000000 HC RM PRIVATE

## 2024-11-02 PROCEDURE — 2580000003 HC RX 258: Performed by: ANESTHESIOLOGY

## 2024-11-02 PROCEDURE — 85025 COMPLETE CBC W/AUTO DIFF WBC: CPT

## 2024-11-02 PROCEDURE — 36415 COLL VENOUS BLD VENIPUNCTURE: CPT

## 2024-11-02 PROCEDURE — 6370000000 HC RX 637 (ALT 250 FOR IP)

## 2024-11-02 PROCEDURE — 6360000002 HC RX W HCPCS

## 2024-11-02 RX ADMIN — METOPROLOL TARTRATE 100 MG: 50 TABLET, FILM COATED ORAL at 21:22

## 2024-11-02 RX ADMIN — SODIUM CHLORIDE, PRESERVATIVE FREE 10 ML: 5 INJECTION INTRAVENOUS at 21:29

## 2024-11-02 RX ADMIN — THIAMINE HYDROCHLORIDE 100 MG: 100 INJECTION, SOLUTION INTRAMUSCULAR; INTRAVENOUS at 09:40

## 2024-11-02 RX ADMIN — SENNOSIDES 8.6 MG: 8.6 TABLET, FILM COATED ORAL at 21:23

## 2024-11-02 RX ADMIN — SODIUM CHLORIDE, PRESERVATIVE FREE 10 ML: 5 INJECTION INTRAVENOUS at 09:39

## 2024-11-02 RX ADMIN — SENNOSIDES 8.6 MG: 8.6 TABLET, FILM COATED ORAL at 09:39

## 2024-11-02 RX ADMIN — PANTOPRAZOLE SODIUM 40 MG: 40 TABLET, DELAYED RELEASE ORAL at 15:47

## 2024-11-02 RX ADMIN — ATORVASTATIN CALCIUM 80 MG: 40 TABLET, FILM COATED ORAL at 09:38

## 2024-11-02 RX ADMIN — AMLODIPINE BESYLATE 5 MG: 5 TABLET ORAL at 09:39

## 2024-11-02 NOTE — CARE COORDINATION
MSW met with pt daughter at bedside, daughter provided Medicaid information for patient (Medicaid ID [id number]). MSW made copy of information and forwarded to Medical records. Medicaid copy placed in pt soft chart. No other needs at this time for case management.

## 2024-11-03 LAB
ANION GAP SERPL CALC-SCNC: 10 MMOL/L (ref 7–16)
BASOPHILS # BLD: 0 K/UL (ref 0–0.2)
BASOPHILS NFR BLD: 0 % (ref 0–2)
BUN SERPL-MCNC: 10 MG/DL (ref 8–23)
CALCIUM SERPL-MCNC: 8.3 MG/DL (ref 8.8–10.2)
CHLORIDE SERPL-SCNC: 108 MMOL/L (ref 98–107)
CO2 SERPL-SCNC: 20 MMOL/L (ref 20–29)
CREAT SERPL-MCNC: 0.9 MG/DL (ref 0.8–1.3)
DIFFERENTIAL METHOD BLD: ABNORMAL
EOSINOPHIL # BLD: 0.1 K/UL (ref 0–0.8)
EOSINOPHIL NFR BLD: 1 % (ref 0.5–7.8)
ERYTHROCYTE [DISTWIDTH] IN BLOOD BY AUTOMATED COUNT: 18.6 % (ref 11.9–14.6)
GLUCOSE SERPL-MCNC: 96 MG/DL (ref 70–99)
HCT VFR BLD AUTO: 35.1 % (ref 41.1–50.3)
HGB BLD-MCNC: 11.5 G/DL (ref 13.6–17.2)
IMM GRANULOCYTES # BLD AUTO: 0.1 K/UL (ref 0–0.5)
IMM GRANULOCYTES NFR BLD AUTO: 1 % (ref 0–5)
LYMPHOCYTES # BLD: 1 K/UL (ref 0.5–4.6)
LYMPHOCYTES NFR BLD: 14 % (ref 13–44)
MCH RBC QN AUTO: 30.8 PG (ref 26.1–32.9)
MCHC RBC AUTO-ENTMCNC: 32.8 G/DL (ref 31.4–35)
MCV RBC AUTO: 94.1 FL (ref 82–102)
MONOCYTES # BLD: 0.9 K/UL (ref 0.1–1.3)
MONOCYTES NFR BLD: 12 % (ref 4–12)
NEUTS SEG # BLD: 5.5 K/UL (ref 1.7–8.2)
NEUTS SEG NFR BLD: 72 % (ref 43–78)
NRBC # BLD: 0.02 K/UL (ref 0–0.2)
PLATELET # BLD AUTO: 235 K/UL (ref 150–450)
PMV BLD AUTO: 10.7 FL (ref 9.4–12.3)
POTASSIUM SERPL-SCNC: 4.8 MMOL/L (ref 3.5–5.1)
RBC # BLD AUTO: 3.73 M/UL (ref 4.23–5.6)
SODIUM SERPL-SCNC: 137 MMOL/L (ref 136–145)
WBC # BLD AUTO: 7.6 K/UL (ref 4.3–11.1)

## 2024-11-03 PROCEDURE — 6370000000 HC RX 637 (ALT 250 FOR IP)

## 2024-11-03 PROCEDURE — 2580000003 HC RX 258: Performed by: ANESTHESIOLOGY

## 2024-11-03 PROCEDURE — 6360000002 HC RX W HCPCS

## 2024-11-03 PROCEDURE — 36415 COLL VENOUS BLD VENIPUNCTURE: CPT

## 2024-11-03 PROCEDURE — 80048 BASIC METABOLIC PNL TOTAL CA: CPT

## 2024-11-03 PROCEDURE — 85025 COMPLETE CBC W/AUTO DIFF WBC: CPT

## 2024-11-03 PROCEDURE — 1100000000 HC RM PRIVATE

## 2024-11-03 PROCEDURE — 6370000000 HC RX 637 (ALT 250 FOR IP): Performed by: FAMILY MEDICINE

## 2024-11-03 RX ADMIN — AMLODIPINE BESYLATE 5 MG: 5 TABLET ORAL at 08:58

## 2024-11-03 RX ADMIN — PANTOPRAZOLE SODIUM 40 MG: 40 TABLET, DELAYED RELEASE ORAL at 17:24

## 2024-11-03 RX ADMIN — SODIUM CHLORIDE, PRESERVATIVE FREE 10 ML: 5 INJECTION INTRAVENOUS at 22:30

## 2024-11-03 RX ADMIN — SENNOSIDES 8.6 MG: 8.6 TABLET, FILM COATED ORAL at 08:58

## 2024-11-03 RX ADMIN — THIAMINE HYDROCHLORIDE 100 MG: 100 INJECTION, SOLUTION INTRAMUSCULAR; INTRAVENOUS at 09:00

## 2024-11-03 RX ADMIN — ATORVASTATIN CALCIUM 80 MG: 40 TABLET, FILM COATED ORAL at 08:58

## 2024-11-03 RX ADMIN — SENNOSIDES 8.6 MG: 8.6 TABLET, FILM COATED ORAL at 22:00

## 2024-11-03 RX ADMIN — SODIUM CHLORIDE, PRESERVATIVE FREE 10 ML: 5 INJECTION INTRAVENOUS at 09:06

## 2024-11-03 RX ADMIN — METOPROLOL TARTRATE 100 MG: 50 TABLET, FILM COATED ORAL at 08:57

## 2024-11-04 LAB
ANION GAP SERPL CALC-SCNC: 14 MMOL/L (ref 7–16)
BASOPHILS # BLD: 0.1 K/UL (ref 0–0.2)
BASOPHILS NFR BLD: 1 % (ref 0–2)
BUN SERPL-MCNC: 18 MG/DL (ref 8–23)
CALCIUM SERPL-MCNC: 7.8 MG/DL (ref 8.8–10.2)
CHLORIDE SERPL-SCNC: 108 MMOL/L (ref 98–107)
CO2 SERPL-SCNC: 19 MMOL/L (ref 20–29)
CREAT SERPL-MCNC: 0.68 MG/DL (ref 0.8–1.3)
DIFFERENTIAL METHOD BLD: ABNORMAL
EOSINOPHIL # BLD: 0.1 K/UL (ref 0–0.8)
EOSINOPHIL NFR BLD: 1 % (ref 0.5–7.8)
ERYTHROCYTE [DISTWIDTH] IN BLOOD BY AUTOMATED COUNT: 18.9 % (ref 11.9–14.6)
GLUCOSE SERPL-MCNC: 115 MG/DL (ref 70–99)
HCT VFR BLD AUTO: 36 % (ref 41.1–50.3)
HGB BLD-MCNC: 11.7 G/DL (ref 13.6–17.2)
IMM GRANULOCYTES # BLD AUTO: 0.2 K/UL (ref 0–0.5)
IMM GRANULOCYTES NFR BLD AUTO: 2 % (ref 0–5)
LYMPHOCYTES # BLD: 1.5 K/UL (ref 0.5–4.6)
LYMPHOCYTES NFR BLD: 15 % (ref 13–44)
MCH RBC QN AUTO: 30.4 PG (ref 26.1–32.9)
MCHC RBC AUTO-ENTMCNC: 32.5 G/DL (ref 31.4–35)
MCV RBC AUTO: 93.5 FL (ref 82–102)
MONOCYTES # BLD: 1 K/UL (ref 0.1–1.3)
MONOCYTES NFR BLD: 10 % (ref 4–12)
NEUTS SEG # BLD: 6.9 K/UL (ref 1.7–8.2)
NEUTS SEG NFR BLD: 71 % (ref 43–78)
NRBC # BLD: 0 K/UL (ref 0–0.2)
PLATELET # BLD AUTO: 284 K/UL (ref 150–450)
PMV BLD AUTO: 10.3 FL (ref 9.4–12.3)
POTASSIUM SERPL-SCNC: 3.9 MMOL/L (ref 3.5–5.1)
RBC # BLD AUTO: 3.85 M/UL (ref 4.23–5.6)
SODIUM SERPL-SCNC: 141 MMOL/L (ref 136–145)
WBC # BLD AUTO: 9.7 K/UL (ref 4.3–11.1)

## 2024-11-04 PROCEDURE — 6370000000 HC RX 637 (ALT 250 FOR IP): Performed by: PHYSICIAN ASSISTANT

## 2024-11-04 PROCEDURE — 6370000000 HC RX 637 (ALT 250 FOR IP)

## 2024-11-04 PROCEDURE — 1100000000 HC RM PRIVATE

## 2024-11-04 PROCEDURE — 6370000000 HC RX 637 (ALT 250 FOR IP): Performed by: FAMILY MEDICINE

## 2024-11-04 PROCEDURE — 36415 COLL VENOUS BLD VENIPUNCTURE: CPT

## 2024-11-04 PROCEDURE — 6360000002 HC RX W HCPCS

## 2024-11-04 PROCEDURE — 97530 THERAPEUTIC ACTIVITIES: CPT

## 2024-11-04 PROCEDURE — 80048 BASIC METABOLIC PNL TOTAL CA: CPT

## 2024-11-04 PROCEDURE — 92526 ORAL FUNCTION THERAPY: CPT

## 2024-11-04 PROCEDURE — 85025 COMPLETE CBC W/AUTO DIFF WBC: CPT

## 2024-11-04 PROCEDURE — 2580000003 HC RX 258: Performed by: ANESTHESIOLOGY

## 2024-11-04 PROCEDURE — 97112 NEUROMUSCULAR REEDUCATION: CPT

## 2024-11-04 RX ADMIN — METOPROLOL TARTRATE 100 MG: 50 TABLET, FILM COATED ORAL at 20:42

## 2024-11-04 RX ADMIN — Medication 3 MG: at 20:40

## 2024-11-04 RX ADMIN — PANTOPRAZOLE SODIUM 40 MG: 40 TABLET, DELAYED RELEASE ORAL at 09:44

## 2024-11-04 RX ADMIN — SENNOSIDES 8.6 MG: 8.6 TABLET, FILM COATED ORAL at 09:44

## 2024-11-04 RX ADMIN — PANTOPRAZOLE SODIUM 40 MG: 40 TABLET, DELAYED RELEASE ORAL at 16:51

## 2024-11-04 RX ADMIN — METOPROLOL TARTRATE 100 MG: 50 TABLET, FILM COATED ORAL at 09:44

## 2024-11-04 RX ADMIN — SODIUM CHLORIDE, PRESERVATIVE FREE 10 ML: 5 INJECTION INTRAVENOUS at 20:41

## 2024-11-04 RX ADMIN — SENNOSIDES 8.6 MG: 8.6 TABLET, FILM COATED ORAL at 20:40

## 2024-11-04 RX ADMIN — SODIUM CHLORIDE, PRESERVATIVE FREE 10 ML: 5 INJECTION INTRAVENOUS at 09:48

## 2024-11-04 RX ADMIN — ATORVASTATIN CALCIUM 80 MG: 40 TABLET, FILM COATED ORAL at 09:44

## 2024-11-04 RX ADMIN — AMLODIPINE BESYLATE 5 MG: 5 TABLET ORAL at 09:44

## 2024-11-04 RX ADMIN — THIAMINE HYDROCHLORIDE 100 MG: 100 INJECTION, SOLUTION INTRAMUSCULAR; INTRAVENOUS at 09:44

## 2024-11-04 NOTE — CARE COORDINATION
Insurance authorization started for short term rehab at [facility] and approved. Facility has a bed tomorrow.     Albina HOUSER, ACM  [place]           6

## 2024-11-05 VITALS
HEIGHT: 67 IN | DIASTOLIC BLOOD PRESSURE: 76 MMHG | OXYGEN SATURATION: 98 % | RESPIRATION RATE: 16 BRPM | BODY MASS INDEX: 21.04 KG/M2 | TEMPERATURE: 97.7 F | SYSTOLIC BLOOD PRESSURE: 118 MMHG | HEART RATE: 61 BPM | WEIGHT: 134.04 LBS

## 2024-11-05 LAB
ANION GAP SERPL CALC-SCNC: 8 MMOL/L (ref 7–16)
BASOPHILS # BLD: 0 K/UL (ref 0–0.2)
BASOPHILS NFR BLD: 0 % (ref 0–2)
BUN SERPL-MCNC: 10 MG/DL (ref 8–23)
CALCIUM SERPL-MCNC: 8.3 MG/DL (ref 8.8–10.2)
CHLORIDE SERPL-SCNC: 106 MMOL/L (ref 98–107)
CO2 SERPL-SCNC: 24 MMOL/L (ref 20–29)
CREAT SERPL-MCNC: 0.91 MG/DL (ref 0.8–1.3)
DIFFERENTIAL METHOD BLD: ABNORMAL
EOSINOPHIL # BLD: 0.1 K/UL (ref 0–0.8)
EOSINOPHIL NFR BLD: 1 % (ref 0.5–7.8)
ERYTHROCYTE [DISTWIDTH] IN BLOOD BY AUTOMATED COUNT: 18.6 % (ref 11.9–14.6)
GLUCOSE SERPL-MCNC: 109 MG/DL (ref 70–99)
HCT VFR BLD AUTO: 35.1 % (ref 41.1–50.3)
HGB BLD-MCNC: 11 G/DL (ref 13.6–17.2)
IMM GRANULOCYTES # BLD AUTO: 0 K/UL (ref 0–0.5)
IMM GRANULOCYTES NFR BLD AUTO: 0 % (ref 0–5)
LYMPHOCYTES # BLD: 1.5 K/UL (ref 0.5–4.6)
LYMPHOCYTES NFR BLD: 16 % (ref 13–44)
MCH RBC QN AUTO: 30.4 PG (ref 26.1–32.9)
MCHC RBC AUTO-ENTMCNC: 31.3 G/DL (ref 31.4–35)
MCV RBC AUTO: 97 FL (ref 82–102)
MONOCYTES # BLD: 0.9 K/UL (ref 0.1–1.3)
MONOCYTES NFR BLD: 10 % (ref 4–12)
NEUTS SEG # BLD: 6.8 K/UL (ref 1.7–8.2)
NEUTS SEG NFR BLD: 73 % (ref 43–78)
NRBC # BLD: 0 K/UL (ref 0–0.2)
PLATELET # BLD AUTO: 295 K/UL (ref 150–450)
PMV BLD AUTO: 10.2 FL (ref 9.4–12.3)
POTASSIUM SERPL-SCNC: 4.3 MMOL/L (ref 3.5–5.1)
RBC # BLD AUTO: 3.62 M/UL (ref 4.23–5.6)
SODIUM SERPL-SCNC: 137 MMOL/L (ref 136–145)
WBC # BLD AUTO: 9.4 K/UL (ref 4.3–11.1)

## 2024-11-05 PROCEDURE — 6370000000 HC RX 637 (ALT 250 FOR IP): Performed by: FAMILY MEDICINE

## 2024-11-05 PROCEDURE — 85025 COMPLETE CBC W/AUTO DIFF WBC: CPT

## 2024-11-05 PROCEDURE — 80048 BASIC METABOLIC PNL TOTAL CA: CPT

## 2024-11-05 PROCEDURE — 92507 TX SP LANG VOICE COMM INDIV: CPT

## 2024-11-05 PROCEDURE — 36415 COLL VENOUS BLD VENIPUNCTURE: CPT

## 2024-11-05 PROCEDURE — 6370000000 HC RX 637 (ALT 250 FOR IP)

## 2024-11-05 PROCEDURE — 2580000003 HC RX 258: Performed by: ANESTHESIOLOGY

## 2024-11-05 PROCEDURE — 92526 ORAL FUNCTION THERAPY: CPT

## 2024-11-05 RX ORDER — OLANZAPINE 5 MG/1
5 TABLET, ORALLY DISINTEGRATING ORAL 2 TIMES DAILY PRN
Qty: 30 TABLET | Refills: 3 | Status: ON HOLD | OUTPATIENT
Start: 2024-11-05

## 2024-11-05 RX ORDER — PANTOPRAZOLE SODIUM 40 MG/1
40 TABLET, DELAYED RELEASE ORAL
Qty: 30 TABLET | Refills: 3 | Status: ON HOLD | OUTPATIENT
Start: 2024-11-05

## 2024-11-05 RX ADMIN — ATORVASTATIN CALCIUM 80 MG: 40 TABLET, FILM COATED ORAL at 09:05

## 2024-11-05 RX ADMIN — METOPROLOL TARTRATE 100 MG: 50 TABLET, FILM COATED ORAL at 09:05

## 2024-11-05 RX ADMIN — AMLODIPINE BESYLATE 5 MG: 5 TABLET ORAL at 09:05

## 2024-11-05 RX ADMIN — SODIUM CHLORIDE, PRESERVATIVE FREE 10 ML: 5 INJECTION INTRAVENOUS at 09:05

## 2024-11-05 RX ADMIN — SENNOSIDES 8.6 MG: 8.6 TABLET, FILM COATED ORAL at 09:05

## 2024-11-05 RX ADMIN — PANTOPRAZOLE SODIUM 40 MG: 40 TABLET, DELAYED RELEASE ORAL at 05:22

## 2024-11-05 NOTE — PROGRESS NOTES
ICU Daily Progress Note  Rashel Lane  10/17/2024   Date of Admission:  10/15/2024    Hospital course:  Infomration per chart    Patient is a 74 y.o. male with HTN/non-compliance/ frequent falls per ER visits due to ETOH/ came in s/p fall at home. Weakness and constipation per history. CT noted to constipation with low BP. Started on ABX since WBC up at 16.7 and +UA with renal stones non-obstructing and gall stones with no cholecystitis. LA was elevated at 4.6 but dropped to 2.6 per notes. So admitted for Sepsis.    Per meds ?CAD since on plavix and ?AFIB on xarelto with ?DM, etc.     Today called to see patient since BP in 80's. Had large drop in hg to 5.4 from 11.4 yesterday. Patient in room in unresponsive and no access and PICC team is placing. Currently not awake or articulate but is able to squeeze my hand on command and was able to move his right toe.     Patient going to ICU and called to see if can assist with care.     No reported hemoptysis/hematemesis/BRBPR/etc.     The patient's chart is reviewed and the patient is discussed with the staff.    Subjective:     Patient post transfusions with Hg up to 9.8 this AM.  Did get 3 units of blood.  Patient is actually a bit more responsive this morning, still difficult to understand.  CAT scan did show possible areas in the transverse colon descending colon which may be indicative of hemorrhage and GI did come by in the morning and wants to do colonoscopy.  Staff tried to contact daughter.  No more melena since yesterday       Review of Systems:  Unable to obtain due to patient factors.     Current Outpatient Medications   Medication Instructions    amLODIPine (NORVASC) 5 MG tablet 1 tablet, Oral, DAILY    atorvastatin (LIPITOR) 80 mg, Oral    clopidogrel (PLAVIX) 75 mg, Oral, DAILY    docusate (COLACE, DULCOLAX) 100 mg, Oral, 2 TIMES DAILY    gabapentin (NEURONTIN) 400 mg, Oral, 3 TIMES DAILY 
       Hospitalist Progress Note   Admit Date:  10/15/2024  2:07 AM   Name:  Rashel Lane   Age:  74 y.o.  Sex:  male  :  1950   MRN:  [id number]   Room:  [facility]    Presenting/Chief Complaint: Loss of Consciousness     Reason(s) for Admission: Dehydration [E86.0]  Septicemia (HCC) [A41.9]  Acute kidney injury (HCC) [N17.9]  Fall, initial encounter [W19.XXXA]  Sepsis (HCC) [A41.9]     Hospital Course:   Rashel Lane is a 74 y.o. male with medical history of hypertension, poor medication compliance, frequent falls secondary to EtOH, peripheral neuropathy, tobacco use disorder, hyperlipidemia and osteoarthritis who presented after a fall at home and concern for GI bleed.      Patient admitted to ICU due to concern for hemorrhagic shock.  HH down to 5.4 from 11.4 secondary to large melanotic stool.  He received 3 units PRBC.  GI consulted and patient underwent upper endoscopy which revealed LA grade B esophagitis with with no bleeding, clotted blood in the gastric body, nonbleeding gastric ulcer with visible vessel treated with bipolar cautery.     Patient was transferred to medical floor on 10/19, noted to have significant delirium likely secondary to his ICU stay. MRI brain 10/26 showed scattered bilateral areas of restricted diffusion concerning for acute infarcts, likely watershed ischemia from hypotension and anemia.  Hospital course complicated by aspiration pneumonia and continued GI bleeding on 10/27. EGD on 10/28 with antral ulcer. Goal of care discussed with family and patient made DNR 10/28. Diet advanced on 10/31 after MBSS per speech therapy.     Subjective & 24hr Events:   No overnight events reported. NGT removed yesterday and diet was advanced. Denied any complaints this morning.       Assessment & Plan:     Hemorrhagic shock  Gastrointestinal hemorrhage  Chronic gastric ulcer with hemorrhage  Esophagitis without bleeding  Acute blood loss anemia  -Hemorrhagic shock demonstrated by greater 
       Hospitalist Progress Note   Admit Date:  10/15/2024  2:07 AM   Name:  Rashel Lane   Age:  74 y.o.  Sex:  male  :  1950   MRN:  [id number]   Room:  [facility]    Presenting/Chief Complaint: Loss of Consciousness     Reason(s) for Admission: Dehydration [E86.0]  Septicemia (HCC) [A41.9]  Acute kidney injury (HCC) [N17.9]  Fall, initial encounter [W19.XXXA]  Sepsis (HCC) [A41.9]     Hospital Course:   Rashel Lane is a 74 y.o. male with medical history of hypertension, poor medication compliance, frequent falls secondary to EtOH, peripheral neuropathy, tobacco use disorder, hyperlipidemia and osteoarthritis who presented after a fall at home and concern for GI bleed.     Patient admitted to ICU due to concern for hemorrhagic shock.  He had large drop in his hemoglobin all the way down to 5.4 from 11.4 at the time of admission secondary to large melanotic stool.  He received 3 units PRBC.  GI consulted and patient underwent upper endoscopy which revealed LA grade B esophagitis with with no bleeding, clotted blood in the gastric body, nonbleeding gastric ulcer with visible vessel treated with bipolar cautery.    Patient was transferred to medical floor on 10/19, noted to have significant delirium likely secondary to his ICU stay. Started on Seroquel 10/20.  Remains with fluctuating mentation.  Became more somnolent and 10/24, not following any commands or opening his eyes.  Seroquel, trazodone and gabapentin held.  CT head negative for acute pathology.     Patient started on tube feedings 10/26.    PT/OT recommend rehab on discharge.    Subjective & 24hr Events:   Patient is seen and examined at the bedside.   Remains somnolent, responding to painful stimuli.  Not following any commands.      Rapid response called earlier this morning due to hypotension.  Also noted with leukocytosis and lactic acidosis.  Patient started on empiric antibiotics.  Will initiate sepsis workup.  Blood cultures ordered.  Will 
       Hospitalist Progress Note   Admit Date:  10/15/2024  2:07 AM   Name:  Rashel Lane   Age:  74 y.o.  Sex:  male  :  1950   MRN:  [id number]   Room:  [facility]    Presenting/Chief Complaint: Loss of Consciousness     Reason(s) for Admission: Dehydration [E86.0]  Septicemia (HCC) [A41.9]  Acute kidney injury (HCC) [N17.9]  Fall, initial encounter [W19.XXXA]  Sepsis (HCC) [A41.9]     Hospital Course:   Rashel Lane is a 74 y.o. male with medical history of hypertension, poor medication compliance, frequent falls secondary to EtOH, peripheral neuropathy, tobacco use disorder, hyperlipidemia and osteoarthritis who presented after a fall at home and concern for GI bleed.     Patient admitted to ICU due to concern for hemorrhagic shock.  He had large drop in his hemoglobin all the way down to 5.4 from 11.4 at the time of admission secondary to large melanotic stool.  He received 3 units PRBC.  GI consulted and patient underwent upper endoscopy which revealed LA grade B esophagitis with with no bleeding, clotted blood in the gastric body, nonbleeding gastric ulcer with visible vessel treated with bipolar cautery.    Patient was transferred to medical floor on 10/19, noted to have significant delirium likely secondary to his ICU stay .  Started on Seroquel 10/20.  Remains with fluctuating mentation.    PT/OT recommend rehab on discharge.    Subjective & 24hr Events:   Patient is seen and examined at the bedside.  Obtunded, unable to get ROS due to patient's condition.  Has received IV and p.o. Zyprexa overnight along with Seroquel.  Was hallucinating overnight.  Unable to sleep.  Will add trazodone to help with sleep tonight.    I spoke to daughter at bedside and updated on patient's current condition.  All questions answered in detail.    Assessment & Plan:     Hemorrhagic shock-resolved  Gastrointestinal hemorrhage  Chronic gastric ulcer with hemorrhage  Esophagitis without bleeding  Acute blood loss 
       Hospitalist Progress Note   Admit Date:  10/15/2024  2:07 AM   Name:  Rashel Lane   Age:  74 y.o.  Sex:  male  :  1950   MRN:  [id number]   Room:  [facility]    Presenting/Chief Complaint: Loss of Consciousness     Reason(s) for Admission: Dehydration [E86.0]  Septicemia (HCC) [A41.9]  Acute kidney injury (HCC) [N17.9]  Fall, initial encounter [W19.XXXA]  Sepsis (HCC) [A41.9]     Hospital Course:   Rashel Lane is a 74 y.o. male with medical history of hypertension, poor medication compliance, frequent falls secondary to EtOH, peripheral neuropathy, tobacco use disorder, hyperlipidemia and osteoarthritis who presented after a fall at home and concern for GI bleed.      Patient admitted to ICU due to concern for hemorrhagic shock.  HH down to 5.4 from 11.4 secondary to large melanotic stool.  He received 3 units PRBC.  GI consulted and patient underwent upper endoscopy which revealed LA grade B esophagitis with with no bleeding, clotted blood in the gastric body, nonbleeding gastric ulcer with visible vessel treated with bipolar cautery.     Patient was transferred to medical floor on 10/19, noted to have significant delirium likely secondary to his ICU stay. MRI brain 10/26 showed scattered bilateral areas of restricted diffusion concerning for acute infarcts, likely watershed ischemia from hypotension and anemia.  Hospital course complicated by aspiration pneumonia and continued GI bleeding on 10/27. EGD on 10/28 with antral ulcer. Goal of care discussed with family and patient made DNR 10/28. Diet advanced on 10/31 after MBSS per speech therapy. Working on SNF rehab.      Subjective & 24hr Events:   No overnight events reported. Much more alert this morning. Mental status continues to improve gradually. Denied any complaints.       Assessment & Plan:     Hemorrhagic shock - resolved  Gastrointestinal hemorrhage  Chronic gastric ulcer with hemorrhage  Esophagitis without bleeding  Acute blood loss 
       Hospitalist Progress Note   Admit Date:  10/15/2024  2:07 AM   Name:  Rashel Lane   Age:  74 y.o.  Sex:  male  :  1950   MRN:  [id number]   Room:  [facility]    Presenting/Chief Complaint: Loss of Consciousness     Reason(s) for Admission: Dehydration [E86.0]  Septicemia (HCC) [A41.9]  Acute kidney injury (HCC) [N17.9]  Fall, initial encounter [W19.XXXA]  Sepsis (HCC) [A41.9]     Hospital Course:   Rashel Lane is a 74 y.o. male with medical history of hypertension, poor medication compliance, frequent falls secondary to EtOH, peripheral neuropathy, tobacco use disorder, hyperlipidemia and osteoarthritis who presented after a fall at home and concern for GI bleed.     Patient admitted to ICU due to concern for hemorrhagic shock.  He had large drop in his hemoglobin all the way down to 5.4 from 11.4 at the time of admission secondary to large melanotic stool.  He received 3 units PRBC.  GI consulted and patient underwent upper endoscopy which revealed LA grade B esophagitis with with no bleeding, clotted blood in the gastric body, nonbleeding gastric ulcer with visible vessel treated with bipolar cautery.    Patient was transferred to medical floor on 10/19, noted to have significant delirium likely secondary to his ICU stay .  Started on Seroquel 10/20.  Remains with fluctuating mentation.    PT/OT recommend rehab on discharge.    Subjective & 24hr Events:   Patient is seen and examined at the bedside.  Sedated after receiving scheduled Seroquel and trazodone at night.  Responding to painful stimuli.  Hemodynamically stable.    Labs reviewed and noted with worsening leukocytosis and creatinine.  Will order infectious workup including UA, chest x-ray and procalcitonin.  Patient diet was advanced yesterday, concern for silent aspiration.  Will ask speech to re-evaluate.    Hemoglobin 8 today, patient was restarted on Xarelto yesterday.  No obvious signs of GI bleed at this time.  Needs close 
       Hospitalist Progress Note   Admit Date:  10/15/2024  2:07 AM   Name:  Rashel Lane   Age:  74 y.o.  Sex:  male  :  1950   MRN:  [id number]   Room:  [facility]    Presenting/Chief Complaint: Loss of Consciousness     Reason(s) for Admission: Dehydration [E86.0]  Septicemia (HCC) [A41.9]  Acute kidney injury (HCC) [N17.9]  Fall, initial encounter [W19.XXXA]  Sepsis (HCC) [A41.9]     Hospital Course:   Rashel Lane is a 74 y.o. male with medical history of hypertension, poor medication, frequent falls secondary to EtOH, peripheral neuropathy, tobacco use disorder hyperlipidemia and osteoarthritis who presented after a fall at home.  At the time of admission patient was able to give detailed account of what happened but denied chest pain dizziness and told admitting provider he was primarily due to weakness.  He was complaining of abdominal pain as well and presented to the emergency department in the past for the same abdominal pain.  CT scans during those evaluations showed constipation.  Patient was found to be tachycardic in the emergency department at the time of admission 208 with blood pressure 82/71.  He had leukocytosis at 16.7.  UA was notable for trace leukocyte esterase with moderate bilirubin and presence of ketones.  Chest x-ray did not demonstrate focal consolidations or infiltrates.  CTAP showed constipation with nonobstructed right renal stones and cholelithiasis without evidence of acute cholecystitis.  His lactate was elevated at 4.6 which down trended after 2 L of normal saline to 2.6.  He was started on Rocephin and admitted to the hospital medicine for further workup and management.    Around 1800 in the evening on 10/16, ICU providers were called because patient had blood pressure in the 80s he was experiencing large drop in his hemoglobin all the way down to 5.4 from 11.4 at the time of admission.  This was because of a large melanotic stool.  He then received 3 units PRBC patient 
       Hospitalist Progress Note   Admit Date:  10/15/2024  2:07 AM   Name:  Rashel Lane   Age:  74 y.o.  Sex:  male  :  1950   MRN:  [id number]   Room:  [facility]    Presenting/Chief Complaint: Loss of Consciousness     Reason(s) for Admission: Dehydration [E86.0]  Septicemia (HCC) [A41.9]  Acute kidney injury (HCC) [N17.9]  Fall, initial encounter [W19.XXXA]  Sepsis (HCC) [A41.9]     Hospital Course:   Rashel Lane is a 74 y.o. male with medical history of hypertension, poor medication compliance, frequent falls secondary to EtOH, peripheral neuropathy, tobacco use disorder, hyperlipidemia and osteoarthritis who presented after a fall at home and concern for GI bleed.      Patient admitted to ICU due to concern for hemorrhagic shock.  HH down to 5.4 from 11.4 secondary to large melanotic stool.  He received 3 units PRBC.  GI consulted and patient underwent upper endoscopy which revealed LA grade B esophagitis with with no bleeding, clotted blood in the gastric body, nonbleeding gastric ulcer with visible vessel treated with bipolar cautery.     Patient was transferred to medical floor on 10/19, noted to have significant delirium likely secondary to his ICU stay. MRI brain 10/26 showed scattered bilateral areas of restricted diffusion concerning for acute infarcts, likely watershed ischemia from hypotension and anemia.  Hospital course complicated by aspiration pneumonia and continued GI bleeding on 10/27. EGD on 10/28 with antral ulcer. Goal of care discussed with family and patient made DNR 10/28. Diet advanced on 10/31 after MBSS per speech therapy.       Subjective & 24hr Events:   No overnight events reported. Denied any complaints this morning. Pleasantly confused.       Assessment & Plan:     Hemorrhagic shock  Gastrointestinal hemorrhage  Chronic gastric ulcer with hemorrhage  Esophagitis without bleeding  Acute blood loss anemia  -Hemorrhagic shock demonstrated by greater than 3 point drop in 
    SPEECH LANGUAGE PATHOLOGY: ATTEMPT     NAME: Rashel Lane  : 1950  MRN: [id number]    ADMISSION DATE: 10/15/2024  PRIMARY DIAGNOSIS: Hemorrhagic shock (HCC)    Speech Therapy attempted. Patient remains somnolent following administration of sedating medication x2 nights ago. RN requesting hold at this time. Will resume POC as appropriate.      Julianna Conrad M.S., CCC-SLP   10/25/2024 9:41 AM    
    SPEECH LANGUAGE PATHOLOGY: ATTEMPT     NAME: Rashel Lane  : 1950  MRN: [id number]    ADMISSION DATE: 10/15/2024  PRIMARY DIAGNOSIS: Hemorrhagic shock (HCC)    Speech Therapy attempted. Patient is somnolent following administration of sedating medication last night. RN requesting hold at this time. Will resume POC tomorrow as appropriate.      Julianna Conrad M.S., CCC-SLP   10/24/2024 12:09 PM    
  0715; BSSR, pt somnolent.  0935: Neuro at bedside, orders rec;'d.  0949: 22 G PIV placed to R. JHONY, MRI screening completed with daughter of pt, Mary, pt unable to wake up to answer questions.Pt is ready now for MRI/MRI notified.    1100: EEG performed at bedside.    1900: BSSR given to oncoming nurse.        
  SPEECH LANGUAGE PATHOLOGY    NAME: Rashel Lane  : 1950  MRN: [id number]    ADMISSION DATE: 10/15/2024  PRIMARY DIAGNOSIS: Hemorrhagic shock (HCC)    Patient brought to radiology for Modified Barium Swallow Study. Alert, speaking intelligibly. Adamantly refusing to participate in study, repeatedly stating \"no, take me back where you got me.\" Notified MD/RN, will attempt to see patient at bedside later today.     JAMEY SHIRLEY  10/30/2024 9:57 AM    
  SPEECH LANGUAGE PATHOLOGY: ATTEMPT     NAME: Rashel Lane  : 1950  MRN: [id number]    ADMISSION DATE: 10/15/2024  PRIMARY DIAGNOSIS: Hemorrhagic shock (HCC)    Speech Therapy attempted, per BORIS Briggs patient is NPO for GI procedure this afternoon. Speech therapy will follow up at a later date/time.     JAMYE SHIRLEY  10/28/2024 12:34 PM    
  TRANSFER - IN REPORT:    Verbal report received from BORIS Burris(name) on Rashel Lane  being received from ER(unit) for routine progression of patient care      Report consisted of patient’s Situation, Background, Assessment and   Recommendations(SBAR).     Information from the following report(s) Nurse Handoff Report, Index, MAR, Recent Results, and Event Log was reviewed with the receiving nurse.    Opportunity for questions and clarification was provided.      Report given to Ozzie Morales RN, who will assume primary care of patient on arrival to floor.        
0015- Virtual sitter ordered for the patient.  0245 Pt has become extremely agitated and keeps trying to get out of bed. Provider notified at 0250 with a request for medication for agitation.  0312- Zyprexa 4 mg given to the patient for agitation.   0515-Pt is calmly resting.   Hourly rounds performed this shift. Bed lowered and locked. Call light within reach. All needs met at this time.    
1 unit of PRBC running currently. Pt urine brown and clear during shift. Pt went for EGD. Dobhoff placed. Tube feedings running (okay per Dr. Gonzales). Pt confused throughout shift. Neuro checks done q4. Family visited pt this AM. Pt turned q2hr.    Rounds performed throughout shift. Pt denies needs at this time. Bed in low position, locked and call light/personal items within reach.   
4 Eyes Skin Assessment     NAME:  Rashel Lane  YOB: 1950  MEDICAL RECORD NUMBER:  [id number]    The patient is being assessed for  Admission    I agree that at least one RN has performed a thorough Head to Toe Skin Assessment on the patient. ALL assessment sites listed below have been assessed.      Areas assessed by both nurses:    Head, Face, Ears, Shoulders, Back, Chest, Arms, Elbows, Hands, Sacrum. Buttock, Coccyx, Ischium, and Legs. Feet and Heels        Does the Patient have a Wound? No noted wound(s)       Mal Prevention initiated by RN: Yes  Wound Care Orders initiated by RN: No    Pressure Injury (Stage 3,4, Unstageable, DTI, NWPT, and Complex wounds) if present, place Wound referral order by RN under : No    New Ostomies, if present place, Ostomy referral order under : No     Nurse 1 eSignature: Electronically signed by Ozzie Morales RN on 10/15/24 at 5:37 PM EDT    **SHARE this note so that the co-signing nurse can place an eSignature**    Nurse 2 eSignature: Electronically signed by Zina Cotter RN on 10/15/24 at 6:23 PM EDT    
4 Eyes Skin Assessment     NAME:  Rashel Lane  YOB: 1950  MEDICAL RECORD NUMBER:  [id number]    The patient is being assessed for  Transfer to New Unit    I agree that at least one RN has performed a thorough Head to Toe Skin Assessment on the patient. ALL assessment sites listed below have been assessed.      Areas assessed by both nurses:    Head, Face, Ears, Shoulders, Back, Chest, Arms, Elbows, Hands, Sacrum. Buttock, Coccyx, Ischium, Legs. Feet and Heels, and Under Medical Devices         Does the Patient have a Wound? No noted wound(s)       Mal Prevention initiated by RN: Yes  Wound Care Orders initiated by RN: No    Pressure Injury (Stage 3,4, Unstageable, DTI, NWPT, and Complex wounds) if present, place Wound referral order by RN under : No    New Ostomies, if present place, Ostomy referral order under : No     Nurse 1 eSignature: Electronically signed by STEWART SIMMONS RN on 10/17/24 at 10:51 PM EDT    **SHARE this note so that the co-signing nurse can place an eSignature**    Nurse 2 eSignature: Electronically signed by TRINY MATA RN on 10/17/24 at 10:53 PM EDT   
4 Eyes Skin Assessment     NAME:  Rashel Lane  YOB: 1950  MEDICAL RECORD NUMBER:  [id number]    The patient is being assessed for  Transfer to New Unit    I agree that at least one RN has performed a thorough Head to Toe Skin Assessment on the patient. ALL assessment sites listed below have been assessed.      Areas assessed by both nurses:    Head, Face, Ears, Shoulders, Back, Chest, Arms, Elbows, Hands, Sacrum. Buttock, Coccyx, Ischium, Legs. Feet and Heels, and Under Medical Devices         Does the Patient have a Wound? No noted wound(s)       Mal Prevention initiated by RN: Yes  Wound Care Orders initiated by RN: No    Pressure Injury (Stage 3,4, Unstageable, DTI, NWPT, and Complex wounds) if present, place Wound referral order by RN under : No    New Ostomies, if present place, Ostomy referral order under : No     Nurse 1 eSignature: Electronically signed by OSCAR ELAINE RN on 10/16/24 at 10:29 PM EDT    **SHARE this note so that the co-signing nurse can place an eSignature**    Nurse 2 eSignature: Electronically signed by Otilio Cedillo RN on 10/16/24 at 10:30 PM EDT    
5564- Pt attempted to get out of bed thinking that he was at his nursing home and that he needed to pee. Pt was redirected back to.  ordered as placed.  
ACUTE OCCUPATIONAL THERAPY GOALS:   (Developed with and agreed upon by patient and/or caregiver.)  1. Patient will complete lower body bathing and dressing with MINIMAL ASSIST and adaptive equipment as needed.     2. Patient will complete toileting with MINIMAL ASSIST.  3. Patient will complete grooming ADL seated edge of bed with STAND BY ASSIST.  4. Patient will tolerate 25 minutes of OT treatment with 1-2 rest breaks to increase activity tolerance for ADLs.   5. Patient will complete functional transfers with CONTACT GUARD ASSIST and adaptive equipment as needed.   6. Patient will follow 1 step commands with MIN verbal cueing to increase participation with ADLs.         Timeframe: 7 visits     OCCUPATIONAL THERAPY: Daily Note AM   OT Visit Days: 2   Time In/Out  OT Charge Capture  Rehab Caseload Tracker  OT Orders    Rashel Lane is a 74 y.o. male   PRIMARY DIAGNOSIS: Hemorrhagic shock (HCC)  Dehydration [E86.0]  Septicemia (HCC) [A41.9]  Acute kidney injury (HCC) [N17.9]  Fall, initial encounter [W19.XXXA]  Sepsis (HCC) [A41.9]  Procedure(s) (LRB):  ESOPHAGOGASTRODUODENOSCOPY / GOLD PROBE / EPI (N/A)  6 Days Post-Op  Inpatient: Payor: [employer] MEDICARE / Plan: [employer] GOLD PLUS HMO / Product Type: *No Product type* /     ASSESSMENT:     REHAB RECOMMENDATIONS:   Recommendation to date pending progress:  Setting:  Short-term Rehab    Equipment:    To Be Determined     ASSESSMENT:  Mr. Lane demonstrates increased alertness today, however continues to demonstrate impairments in strength, balance, coordination, activity tolerance and cognition. BUE AROM are also limited, especially shoulder flexion in LUE. Difficult to assess whether pt vision is WFL due to cognition, inability to follow complex directions. He performs bed mobility with Min A x 1-2. While sitting edge of bed, pt demonstrates trunk ataxia and requires fluctuating assist for sitting balance from CGA-Max A due to posterior lean. He requires maximal 
ACUTE OCCUPATIONAL THERAPY GOALS:   (Developed with and agreed upon by patient and/or caregiver.)  1. Patient will complete lower body bathing and dressing with MINIMAL ASSIST and adaptive equipment as needed.     2. Patient will complete toileting with MINIMAL ASSIST.  3. Patient will complete grooming ADL seated edge of bed with STAND BY ASSIST.  4. Patient will tolerate 25 minutes of OT treatment with 1-2 rest breaks to increase activity tolerance for ADLs.   5. Patient will complete functional transfers with CONTACT GUARD ASSIST and adaptive equipment as needed.   6. Patient will follow 1 step commands with MIN verbal cueing to increase participation with ADLs.       Timeframe: 7 visits       OCCUPATIONAL THERAPY Daily Note and Re-evaluation       OT Visit Days: 1  Acknowledge Orders  Time  OT Charge Capture  Rehab Caseload Tracker      Rashel Lane is a 74 y.o. male   PRIMARY DIAGNOSIS: Hemorrhagic shock (HCC)  Dehydration [E86.0]  Septicemia (HCC) [A41.9]  Acute kidney injury (HCC) [N17.9]  Fall, initial encounter [W19.XXXA]  Sepsis (HCC) [A41.9]  Procedure(s) (LRB):  ESOPHAGOGASTRODUODENOSCOPY / GOLD PROBE / EPI (N/A)  2 Days Post-Op  Reason for Referral: Generalized Muscle Weakness (M62.81)  History of falling (Z91.81)  Inpatient: Payor: [employer] MEDICARE / Plan: [employer] GOLD PLUS HMO / Product Type: *No Product type* /     ASSESSMENT:     REHAB RECOMMENDATIONS:   Recommendation to date pending progress:  Setting:  Short-term Rehab    Equipment:    To Be Determined     ASSESSMENT:  Mr. Lane was admitted with fall, HAM, sepsis. Pt presented confused with generalized weakness and with deficits in cognition, mobility, balance, and activity tolerance impacting ADLs--has had extensive hospital stay in ICU. Pt seen for re-evaluation today, 10/19/24. Pt is no longer agitated, however restless and with difficulty following commands. Pt noted to be soiled, required mod A x2 for rolling, max A for mirian care/brief 
ACUTE OCCUPATIONAL THERAPY GOALS:   (Developed with and agreed upon by patient and/or caregiver.)  1. Patient will complete lower body bathing and dressing with MODERATE ASSIST and adaptive equipment as needed.     2. Patient will complete toileting with MODERATE ASSIST.  3. Patient will complete grooming ADL in unsupported sitting with MODERATE ASSIST.  4. Patient will tolerate 25 minutes of OT treatment with 2-3 rest breaks to increase activity tolerance for ADLs.   5. Patient will complete functional transfers with MODERATE ASSIST and adaptive equipment as needed.   6. Patient will follow 90% of 1-step commands with <5 verbal cues from therapist.     Timeframe: 7 visits    OCCUPATIONAL THERAPY: Daily Note AM   OT Visit Days: 3   Time In/Out  OT Charge Capture  Rehab Caseload Tracker  OT Orders    Rashel Lane is a 74 y.o. male   PRIMARY DIAGNOSIS: Hemorrhagic shock (HCC)  Dehydration [E86.0]  Septicemia (HCC) [A41.9]  Acute kidney injury (HCC) [N17.9]  Fall, initial encounter [W19.XXXA]  Sepsis (HCC) [A41.9]  Procedure(s) (LRB):  ESOPHAGOGASTRODUODENOSCOPY and for Dobhoff placement (N/A)  7 Days Post-Op  Inpatient: Payor: [employer] MEDICARE / Plan: [employer] GOLD PLUS HMO / Product Type: *No Product type* /     ASSESSMENT:     REHAB RECOMMENDATIONS:   Recommendation to date pending progress:  Setting:  Short-term Rehab    Equipment:    To Be Determined     ASSESSMENT:  Mr. Lane demonstrates increased command following and verbalizations today, however continues to demonstrate impairments in strength, balance, coordination, activity tolerance and cognition. Strongly suspect visual impairment/ potential visual field cut however unable to assess due to current cognition. Pt does better with tactile, manual and auditory cueing as opposed to visual. L shoulder AROM continues to be limited- pt reports he has arthritis and issues with the shoulder at baseline (unclear if this is accurate).     He requires Max A x 2 for 
ACUTE OCCUPATIONAL THERAPY GOALS:   (Developed with and agreed upon by patient and/or caregiver.)  1. Patient will complete lower body bathing and dressing with MODERATE ASSIST and adaptive equipment as needed.     2. Patient will complete toileting with MODERATE ASSIST.  3. Patient will complete grooming ADL in unsupported sitting with MODERATE ASSIST.  4. Patient will tolerate 25 minutes of OT treatment with 2-3 rest breaks to increase activity tolerance for ADLs.   5. Patient will complete functional transfers with MODERATE ASSIST and adaptive equipment as needed.   6. Patient will follow 90% of 1-step commands with <5 verbal cues from therapist.     Timeframe: 7 visits    OCCUPATIONAL THERAPY: Daily Note PM   OT Visit Days: 2   Time In/Out  OT Charge Capture  Rehab Caseload Tracker  OT Orders    Rashel Lane is a 74 y.o. male   PRIMARY DIAGNOSIS: Hemorrhagic shock (HCC)  Dehydration [E86.0]  Septicemia (HCC) [A41.9]  Acute kidney injury (HCC) [N17.9]  Fall, initial encounter [W19.XXXA]  Sepsis (HCC) [A41.9]  Procedure(s) (LRB):  ESOPHAGOGASTRODUODENOSCOPY and for Dobhoff placement (N/A)  3 Days Post-Op  Inpatient: Payor: [employer] MEDICARE / Plan: [employer] GOLD PLUS HMO / Product Type: *No Product type* /     ASSESSMENT:     REHAB RECOMMENDATIONS:   Recommendation to date pending progress:  Setting:  Short-term Rehab    Equipment:    To Be Determined     ASSESSMENT:  Mr. Lane demonstrates increased alertness today, however continues to demonstrate impairments in strength, balance, coordination, activity tolerance and cognition. He requires Max A x 2 for bed mobility and Max A x 1-2 to maintain dynamic sitting balance with significant posterior lean. Max cueing for hand placement and to lean forward. While sitting edge of bed, PCT performed sponge bath- max assist as pt requires BUE support on bed to maintain midline. At one point, pt goes into hip extension and requires max A x 2 to prevent from sliding off of 
ACUTE OCCUPATIONAL THERAPY GOALS:   (Developed with and agreed upon by patient and/or caregiver.)  1. Pt will toilet with SBA   2. Pt will complete functional mobility for ADLs with SBA using AD as needed  3. Pt will complete lower body dressing with SBA using AE as needed  4. Pt will complete grooming and hygiene at sink with SBA  5.. Pt will tolerate 23 minutes functional activity with min or fewer rest breaks to promote increased endurance for ADLs        Timeframe: 7 visits    OCCUPATIONAL THERAPY: Daily Note AM   OT Visit Days: 2   Time In/Out  OT Charge Capture  Rehab Caseload Tracker  OT Orders    Rashel aLne is a 74 y.o. male   PRIMARY DIAGNOSIS: Sepsis (HCC)  Dehydration [E86.0]  Septicemia (HCC) [A41.9]  Acute kidney injury (HCC) [N17.9]  Fall, initial encounter [W19.XXXA]  Sepsis (HCC) [A41.9]       Inpatient: Payor: [employer] MEDICARE / Plan: [employer] GOLD PLUS HMO / Product Type: *No Product type* /     ASSESSMENT:     REHAB RECOMMENDATIONS:   Recommendation to date pending progress:  Setting:  Short-term Rehab    Equipment:    To Be Determined     ASSESSMENT:  Mr. Lane present with deficits in overall strength, activity tolerance, ADL performance, and functional mobility. More lethargic and confused today. Noted regression in overall mobility and functional status. Initially mod A for functional bed mobility and sit <> stand transfers however regressing to max A x 2 by end of session. Attempted several times to stand to facilitate transfer to bedside commode however pt too unsteady w/ poor sitting and standing balance for transfer to bedside commode. Ultimately returned to supine in bed with needs met. Sitter at bedside. No progress towards therapy goals. Will continue OT efforts as indicated.        SUBJECTIVE:     Mr. Lane states, \"Hmmm.\"     Social/Functional Lives With: Alone  Type of Home: Apartment  Home Layout: One level  Home Access: Stairs to enter with rails  Entrance Stairs - Number of 
ACUTE PHYSICAL THERAPY GOALS:   (Developed with and agreed upon by patient and/or caregiver.)        LTG:  (1.)Mr. Lane will move from supine to sit and sit to supine , scoot up and down, and roll side to side in bed with MODERATE ASSIST within 7 treatment day(s).    (2.)Mr. Lane will transfer from bed to chair and chair to bed with MODERATE ASSIST using the least restrictive device within 7 treatment day(s).    (3.)Mr. Lane will ambulate with MODERATE ASSIST for 25 feet with the least restrictive device within 7 treatment day(s).  (4.)Mr. Lane will tolerate at least 23 min of dynamic standing activity to assist standing ADLs with the least restrictive device within 7 treatment days.    PHYSICAL THERAPY: Daily Note PM   (Link to Caseload Tracking: PT Visit Days : 3  Time In/Out PT Charge Capture  Rehab Caseload Tracker  Orders    Rashel Laen is a 74 y.o. male   PRIMARY DIAGNOSIS: Hemorrhagic shock (HCC)  Dehydration [E86.0]  Septicemia (HCC) [A41.9]  Acute kidney injury (HCC) [N17.9]  Fall, initial encounter [W19.XXXA]  Sepsis (HCC) [A41.9]  Procedure(s) (LRB):  ESOPHAGOGASTRODUODENOSCOPY and for Dobhoff placement (N/A)  3 Days Post-Op  Inpatient: Payor: [employer] MEDICARE / Plan: [employer] GOLD PLUS HMO / Product Type: *No Product type* /     ASSESSMENT:     REHAB RECOMMENDATIONS:   Recommendation to date pending progress:  Setting:  Short-term Rehab    Equipment:    To Be Determined     ASSESSMENT:  Mr. Lane was supine in bed with B mitts on arrival. Supine to sit on edge of bed with mod/max A x2. Worked on sitting balance and he tolerated sitting for extended period of time with close SBA to max A. Rolled left and right for brief change. Pt with noted breakdown on his bottom, charge nurse notified. Pt positioned on left side with pillows in place for comfort.      SUBJECTIVE:   Mr. Lane states, \"OK\"     Social/Functional Lives With: Alone  Type of Home: Apartment  Home Layout: One level  Home Access: Stairs 
ACUTE PHYSICAL THERAPY GOALS:   (Developed with and agreed upon by patient and/or caregiver.)      LTG:  (1.)Mr. Lane will move from supine to sit and sit to supine , scoot up and down, and roll side to side in bed with MODERATE ASSIST within 7 treatment day(s).    (2.)Mr. Lane will transfer from bed to chair and chair to bed with MODERATE ASSIST using the least restrictive device within 7 treatment day(s).    (3.)Mr. Lane will ambulate with MODERATE ASSIST for 25 feet with the least restrictive device within 7 treatment day(s).  (4.)Mr. Lane will tolerate at least 23 min of dynamic standing activity to assist standing ADLs with the least restrictive device within 7 treatment days.      ________________________________________________________________________________________________        PHYSICAL THERAPY Initial Assessment, Daily Note, and AM  (Link to Caseload Tracking: PT Visit Days : 2  Acknowledge Orders  Time In/Out  PT Charge Capture  Rehab Caseload Tracker    Rashel Lane is a 74 y.o. male   PRIMARY DIAGNOSIS: Hemorrhagic shock (HCC)  Dehydration [E86.0]  Septicemia (HCC) [A41.9]  Acute kidney injury (HCC) [N17.9]  Fall, initial encounter [W19.XXXA]  Sepsis (HCC) [A41.9]  Procedure(s) (LRB):  ESOPHAGOGASTRODUODENOSCOPY and for Dobhoff placement (N/A)  1 Day Post-Op  Reason for Referral: Generalized Muscle Weakness (M62.81)  Difficulty in walking, Not elsewhere classified (R26.2)  Inpatient: Payor: [employer] MEDICARE / Plan: [employer]NA GOLD PLUS HMO / Product Type: *No Product type* /     ASSESSMENT:     REHAB RECOMMENDATIONS:   Recommendation to date pending progress:  Setting:  Short-term Rehab    Equipment:    To Be Determined     ASSESSMENT:  Mr. Lane is a 74 year old male admitted to the hospital on 10/15/24 following fall at home. On 10/16, pt transferred to the ICU due to hypotension and decreased HgB due to GI bleed. Pt transferred back to floor on 10/17. Pt now presents for re-evaluation today due 
At 2042, pt as beginning to get agitated and get out of bed. Pt was given 5mg of zyprexa. At 0003, pt again was agitated and trying to pull at everything. Pt was given 5 mg of PO zyprexa. At 0145, pt again wa very restles and agitated . Messaged provider for a one time order of something else to be given for agitation. Provider ordered 10 mg of geodon. At 0246, a 20 gauge IV was plaed in the right ac of the patent. At 0307, the right IJ was removed from the right side of the neck. Pressure was held for 5-7 minutes and a pressure dressing was placed to the right side of the neck. Pt continues to be very restless and agitated. Hourly rounds performed this shift. Bed lowered and locked. Call light within reach. All needs met at this time.    
At morning assessment pt was found to be lethargic and only responding to pain, MD aware, pt continued to only respond to pain throughout the shift. Urine sample sent, see results. Pt is resting in bed without any complaints. Hourly rounds completed and all needs met. Bed is low, locked, bed alarm on, VSC on and in room, call light is in reach and pt is encouraged to call for assistance.  
CH responded to a request to a rapid response per protocol. There was no family member at the time of the visit. Rapid response team working with the pt. Ch offered silent prayer for the team and the pt. Ch provided spiritual care and emotional support through pastoral presence, non-anxious presence, and silent prayer. CH is available as needed.   
Clinical Instructor with student reviewing chart.  
Code S called. Patient moaning. BP low. Vascular access attempting to get IV access.   180 B  180 Rapid Response called   BP: 72/58, , SpO2 97% on RA.   3L NC applied  181 Dr. Farr at bedside. Still attempting to gain IV access. Bolus ready to run. Patient is responsive; able to squeeze hands and wiggle toes.  182 IV access obtained, starting IV bolus. BP 92/63 (73)  Taking patient to CT.  
Comprehensive Nutrition Assessment    Type and Reason for Visit: Initial, Positive Nutrition Screen  Malnutrition Screening Tool: Malnutrition Screen  Have you recently lost weight without trying?: 2 to 13 pounds (1 point)  Have you been eating poorly because of a decreased appetite?: Yes (1 point)  Malnutrition Screening Tool Score: 2    Nutrition Recommendations/Plan:   Meals and Snacks:  Diet: Liberalize to 4 CHO, remove Na restriction  Oral Nutriton Supplement Therapy:   Medical food supplement therapy:  Continue Ensure Enlive (high calorie high protein) 350 calories, 20 grams protein per 8 ounce serving      Malnutrition Assessment:  Malnutrition Status: Moderate malnutrition  Context: Chronic Illness  Findings of clinical characteristics of malnutrition:   Energy Intake:  Unable to assess  Weight Loss:  Greater than 10% over 6 months (14.6% in 4 months)     Body Fat Loss:  No significant body fat loss     Muscle Mass Loss:  Mild muscle mass loss Temples (temporalis), Clavicles (pectoralis & deltoids), Calf (gastrocnemius), Hand (interosseous)  Fluid Accumulation:  No significant fluid accumulation     Strength:  Normal  strength     Nutrition Assessment:  Nutrition History: Unable to provide history.      Do You Have Any Cultural, Worship, or Ethnic Food Preferences?: No   Weight History: 11/21/23 143#, 3/12/24 140#, 6/18/24 142#  Nutrition Background:       PMH significant for HTN DM, HLD, poor compliance. Presented s/p fall. Admitted with sepsis, constipation, medical noncompliance.   Nutrition Interval:  Patient reclined in bed with sitter. He reports he ate a good lunch. Sitter reports no lunch and only ate a little bit of breakfast. Ensure at bedside which was offered to patient and he drank the entire serving. When reviewing flavors he would prefer strawberry.      Lab Results   Component Value Date/Time    POCGLU 117 10/16/2024 07:24 AM    POCGLU 109 10/15/2024 04:52 PM    POCGLU 114 
Comprehensive Nutrition Assessment    Type and Reason for Visit: Reassess  Malnutrition Screening Tool: Malnutrition Screen  Have you recently lost weight without trying?: 2 to 13 pounds (1 point)  Have you been eating poorly because of a decreased appetite?: Yes (1 point)  Malnutrition Screening Tool Score: 2  General Nutrition Management: Hospitalist    Nutrition Recommendations/Plan:   Meals and Snacks:  Diet: Continue current order  Oral Nutriton Supplement Therapy:   Medical food supplement therapy:  Continue Ensure Enlive (high calorie high protein) 350 calories, 20 grams protein per 8 ounce serving    SLP consulted during IDT rounds to eval if diet texture can be advanced     Malnutrition Assessment:  Malnutrition Status: Moderate malnutrition  Context: Chronic Illness  Findings of clinical characteristics of malnutrition:   Energy Intake:  Unable to assess  Weight Loss:  Greater than 10% over 6 months (14.6% in 4 months)     Body Fat Loss:  No significant body fat loss     Muscle Mass Loss:  Mild muscle mass loss Temples (temporalis), Clavicles (pectoralis & deltoids), Calf (gastrocnemius), Hand (interosseous)  Fluid Accumulation:  No significant fluid accumulation     Strength:  Normal  strength     Nutrition Assessment:  Nutrition History: Unable to provide history.      Do You Have Any Cultural, Latter-day, or Ethnic Food Preferences?: No   Weight History: 11/21/23 143#, 3/12/24 140#, 6/18/24 142#  Nutrition Background:       PMH significant for HTN DM, HLD, poor compliance. Presented s/p fall.   Admitted with sepsis, constipation, medical noncompliance.   Admitted with hemorrhagic shock, GI hemorrhage, chronic gastric ulcer with hemorrhage, melena, acute blood loss anemia, HAM.    10/17: EGD  Impression: LA Grade B esophagitis with no bleeding. Clotted blood in the gastric body. Non-bleeding gastric ulcer with a visible vessel. Injected. Treated with bipolar cautery. Normal examined duodenum. No 
Comprehensive Nutrition Assessment    Type and Reason for Visit: Reassess  Malnutrition Screening Tool: Malnutrition Screen  Have you recently lost weight without trying?: 2 to 13 pounds (1 point)  Have you been eating poorly because of a decreased appetite?: Yes (1 point)  Malnutrition Screening Tool Score: 2  General Nutrition Management: Hospitalist    Nutrition Recommendations/Plan:   Meals and Snacks:  Diet: Continue current order  Oral Nutriton Supplement Therapy:   Medical food supplement therapy:  Continue Ensure Enlive (high calorie high protein) 350 calories, 20 grams protein per 8 ounce serving  request chocolate flavor  If pt is alert for oral intake, encourage ensure first for nutrient density.    Pt is currently day 6 of admission wit compromised oral intake, slowly improving continue to optimize oral intake by encouraging ensure over other items due to nutrient density.       Malnutrition Assessment:  Malnutrition Status: Moderate malnutrition  Context: Chronic Illness  Findings of clinical characteristics of malnutrition:   Energy Intake:  Unable to assess  Weight Loss:  Greater than 10% over 6 months (14.6% in 4 months)     Body Fat Loss:  No significant body fat loss     Muscle Mass Loss:  Mild muscle mass loss Temples (temporalis), Clavicles (pectoralis & deltoids), Calf (gastrocnemius), Hand (interosseous)  Fluid Accumulation:  No significant fluid accumulation     Strength:  Normal  strength     Nutrition Assessment:  Nutrition History: Unable to provide history.      Do You Have Any Cultural, Episcopalian, or Ethnic Food Preferences?: No   Weight History: 11/21/23 143#, 3/12/24 140#, 6/18/24 142#  Nutrition Background:       PMH significant for HTN DM, HLD, poor compliance. Presented s/p fall.   Admitted with sepsis, constipation, medical noncompliance.   Admitted with hemorrhagic shock, GI hemorrhage, chronic gastric ulcer with hemorrhage, melena, acute blood loss anemia, HAM.    10/17: 
Comprehensive Nutrition Assessment    Type and Reason for Visit: Reassess  Malnutrition Screening Tool: Malnutrition Screen  Have you recently lost weight without trying?: 2 to 13 pounds (1 point)  Have you been eating poorly because of a decreased appetite?: Yes (1 point)  Malnutrition Screening Tool Score: 2  General Nutrition Management: Hospitalist  Tube Feeding Management: Hosptialist    Nutrition Recommendations/Plan:   Meals and Snacks:  Diet:  Continue NPO  Enteral Nutrition:   Enteral Access: Nasogastric   Continue   Formula: Standard without Fiber (Osmolite 1.2 Davis)  Goal Rate: Continuous 65 ml/hr  Continue  Water flush  115 ml every 4 hours  Modulars: None not indicated at this time   Enteral regimen at above goal to provide per 24 hours:  1716 calories, 79 grams protein and 1863 ml free fluid.    Above regimen: Intended to meet macronutrient goals Increased FWF provisions d/t hypernatremia  IV Fluids:  Not applicable  Labs:   EN labs: BMP daily, Mg daily x 7 days at initiation then MWF and Phos daily x 7 days at initiation then MWF.     POC Glucoses/SSI Not indicated  Nutrition Related Medication Management:  Electrolyte Replacement Protocol PRN Continue for Magnesium and Potassium      Phosphorus replacement to be addressed individually secondary to national shortages.     Thiamine 100 mg daily x 7 days (EOT 11/5)  Bowel Regimen  Active per MD     Malnutrition Assessment:  Malnutrition Status: Moderate malnutrition  Context: Chronic Illness  Findings of clinical characteristics of malnutrition:   Energy Intake:  Unable to assess  Weight Loss:  Greater than 10% over 6 months (14.6% in 4 months)     Body Fat Loss:  No significant body fat loss     Muscle Mass Loss:  Mild muscle mass loss Temples (temporalis), Clavicles (pectoralis & deltoids), Calf (gastrocnemius), Hand (interosseous)  Fluid Accumulation:  No significant fluid accumulation     Strength:  Normal  strength     Nutrition 
Comprehensive Nutrition Assessment    Type and Reason for Visit: Reassess  Malnutrition Screening Tool: Malnutrition Screen  Have you recently lost weight without trying?: 2 to 13 pounds (1 point)  Have you been eating poorly because of a decreased appetite?: Yes (1 point)  Malnutrition Screening Tool Score: 2  General Nutrition Management: Hospitalist  Tube Feeding Management: Hosptialist    Nutrition Recommendations/Plan:   Meals and Snacks:  Diet: Continue current order  Oral Nutriton Supplement Therapy:   Medical food supplement therapy:  Continue Ensure Enlive (high calorie high protein) 350 calories, 20 grams protein per 8 ounce serving   Enteral Nutrition:   Discontinue tube feeding and associated labs      Malnutrition Assessment:  Malnutrition Status: Moderate malnutrition  Context: Chronic Illness  Findings of clinical characteristics of malnutrition:   Energy Intake:  Unable to assess  Weight Loss:  Greater than 10% over 6 months (14.6% in 4 months)     Body Fat Loss:  No significant body fat loss     Muscle Mass Loss:  Mild muscle mass loss Temples (temporalis), Clavicles (pectoralis & deltoids), Calf (gastrocnemius), Hand (interosseous)  Fluid Accumulation:  No significant fluid accumulation     Strength:  Normal  strength     Nutrition Assessment:  Nutrition History: Unable to provide history.      Do You Have Any Cultural, Moravian, or Ethnic Food Preferences?: No   Weight History: 11/21/23 143#, 3/12/24 140#, 6/18/24 142#  Nutrition Background:       PMH significant for HTN DM, HLD, poor compliance. Presented s/p fall.   Admitted with sepsis, constipation, medical noncompliance.   Admitted with hemorrhagic shock, GI hemorrhage, chronic gastric ulcer with hemorrhage, melena, acute blood loss anemia, HAM.    10/17: EGD  Impression: LA Grade B esophagitis with no bleeding. Clotted blood in the gastric body. Non-bleeding gastric ulcer with a visible vessel. Injected. Treated with bipolar 
Comprehensive Nutrition Assessment    Type and Reason for Visit: Reassess  Malnutrition Screening Tool: Malnutrition Screen  Have you recently lost weight without trying?: 2 to 13 pounds (1 point)  Have you been eating poorly because of a decreased appetite?: Yes (1 point)  Malnutrition Screening Tool Score: 2  General Nutrition Management: Hospitalist  Tube Feeding Management: Hosptialist    Nutrition Recommendations/Plan:   Meals and Snacks:  Diet: Continue current order  Oral Nutriton Supplement Therapy:   Medical food supplement therapy:  Continue Ensure Enlive (high calorie high protein) 350 calories, 20 grams protein per 8 ounce serving  - no chocolate     Malnutrition Assessment:  Malnutrition Status: Moderate malnutrition  Context: Chronic Illness  Findings of clinical characteristics of malnutrition:   Energy Intake:  Unable to assess  Weight Loss:  Greater than 10% over 6 months (14.6% in 4 months)     Body Fat Loss:  No significant body fat loss     Muscle Mass Loss:  Mild muscle mass loss Temples (temporalis), Clavicles (pectoralis & deltoids), Calf (gastrocnemius), Hand (interosseous)  Fluid Accumulation:  No significant fluid accumulation     Strength:  Normal  strength     Nutrition Assessment:  Nutrition History: Unable to provide history.      Do You Have Any Cultural, Samaritan, or Ethnic Food Preferences?: No   Weight History: 11/21/23 143#, 3/12/24 140#, 6/18/24 142#  Nutrition Background:       PMH significant for HTN DM, HLD, poor compliance. Presented s/p fall.   Admitted with sepsis, constipation, medical noncompliance.   Admitted with hemorrhagic shock, GI hemorrhage, chronic gastric ulcer with hemorrhage, melena, acute blood loss anemia, HAM.    10/17: EGD  Impression: LA Grade B esophagitis with no bleeding. Clotted blood in the gastric body. Non-bleeding gastric ulcer with a visible vessel. Injected. Treated with bipolar cautery. Normal examined duodenum. No specimens collected. 
Consult placed for PIV placement. Patient was uncooperative and agitated, and would not allow us to evaluate his arms with ultrasound. Patient stated that, \"you ain't gonna look at my business\". Primary RN notified and instructed to call us when patient is more agreeable.   
Date of Outreach Update:  Rashel Lane was seen and assessed.         Vitals:    10/28/24 1435 10/28/24 1444 10/28/24 1451 10/28/24 1453   BP: 102/60 (!) 94/54  (!) 98/56   Pulse: 63 70  73   Resp: 14 14  14   Temp: 98 °F (36.7 °C)      TempSrc: Temporal  Tympanic    SpO2: 100% 97%  99%   Weight:       Height:          Pt in ENDO during afternoon rounds. Pt had advanced care planning conversation today with MD and is now a DNR. Will follow up with patient following procedure   Previous Outreach assessment has been reviewed.  There have been no significant clinical changes since the completion of the last dated Outreach assessment.    Will continue to follow up per outreach protocol.    Signed By:   Linus Simms RN    October 28, 2024 3:12 PM     
Date of Outreach Update:  Rashel Lane was seen and assessed.       Vitals:    10/27/24 1957 10/27/24 2040 10/27/24 2231 10/28/24 0327   BP: 139/73 107/62 112/64 129/86   Pulse: 100 95 (!) 102 91   Resp: 19 18 22 18   Temp: 98.6 °F (37 °C) 98 °F (36.7 °C) 98 °F (36.7 °C) 98.2 °F (36.8 °C)   TempSrc: Axillary Axillary Axillary Axillary   SpO2: 100% 100% 100% 99%   Weight:       Height:          Labs and notes reviewed. No acute events over last night pt more stable following the 2 UNITS of PRBC infused yesterday evening. Per GI note from yesterday (will plan for repeat EGD today. Will also change NGT to dobhoff during EGD). Neurology still following for on going concerns with mentation. Follow up made with primary RN. Output from NGT very minimal. Pt able to state first and last name only. VSS.     Latest Reference Range & Units 10/28/24 00:01   Hemoglobin Quant 13.6 - 17.2 g/dL 7.1 (L)   (L): Data is abnormally low    Previous Outreach assessment has been reviewed.  There have been no significant clinical changes since the completion of the last dated Outreach assessment.    Will continue to follow up per outreach protocol.    Signed By:   Linus Simms RN    October 28, 2024 7:14 AM   .  
EEG completed.  -Earnestine barbour,  EEG tech  
GOALS:  LTG: Patient will maintain adequate hydration/nutrition with optimum safety and efficiency of swallowing function with PO intake without overt signs or symptoms of aspiration for the highest appropriate diet level.  STG: Goals updated to reflect patient current level of functioning   Patient will safely ingest diet trials during therapeutic feedings with SLP without overt signs or symptoms of respiratory compromise in efforts to advance diet.     SPEECH LANGUAGE PATHOLOGY: DYSPHAGIA Daily Note #1    Acknowledge Order  I  Therapy Time  I   Charges     I  Rehab Caseload Tracker    NAME: Rashel Lane  : 1950  MRN: [id number]    ADMISSION DATE: 10/15/2024  PRIMARY DIAGNOSIS: Hemorrhagic shock (HCC)    ICD-10: Treatment Diagnosis: R13.12 Dysphagia, Oropharyngeal Phase    RECOMMENDATIONS   Diet:    NPO  Continued use of NG for nutrition, defer management to RD    Medication: non-oral   Compensatory Swallowing Strategies:   Assist feed  Small bites/sips  Upright as possible for all oral intake   Therapeutic Intervention:   Patient/family education  Dysphagia treatment   Patient continues to require skilled intervention:  Yes. Recommend ongoing speech therapy services during this hospitalization.     Anticipated Discharge Needs: Ongoing speech therapy is recommended at next level of care.      ASSESSMENT    Patient with reduced alertness, roused with verbal/tactile cues. Good oral acceptance with trials of thin liquid, immediate and delayed coughing observed. Will attempt Modified Barium Swallow Study tomorrow for objective assessment of swallow function, discussed with Dr. Almonte.     Recommend strict NPO. Speech therapy will continue to follow for reassessment.   GENERAL    Subjective: BORIS Briggs cleared patient to participate with speech therapy. Patient asleep on entry, roused with verbal/tactile cues. Patient intermittently verbalizing though largely unintelligible, able to say yes in confirmation to name. 
GOALS:  LTG: Patient will maintain adequate hydration/nutrition with optimum safety and efficiency of swallowing function with PO intake without overt signs or symptoms of aspiration for the highest appropriate diet level.  STG: PROGRESSING 24  Patient will consume minced and moist textures and thin liquids without overt signs or symptoms of airway compromise.  Patient will safely ingest diet trials during therapeutic feedings with SLP without overt signs or symptoms of respiratory compromise in efforts to advance diet.  Patient will complete a Modified Barium Swallow study to fully assess physiology and anatomy of the swallow and determine safest appropriate diet and/or rehabilitation strategies, as medically indicated.  MET 10/31/24     LTG: Patient will increase neuro-linguistic abilities to increase safety and awareness in functional living environment   STG: PROGRESSING 24  Patient will display orientation to temporal information with 80% accuracy given minimal cueing.   Patient will recall recent events with 80% accuracy given minimal cueing.    SPEECH LANGUAGE PATHOLOGY: COMBINED DYSPHAGIA and COGNITIVE COMMUNICAITON Daily Note #5    Acknowledge Order  I  Therapy Time  I   Charges     I  Rehab Caseload Tracker    NAME: Rashel Lane  : 1950  MRN: [id number]    ADMISSION DATE: 10/15/2024  PRIMARY DIAGNOSIS: Hemorrhagic shock (HCC)    ICD-10: Treatment Diagnosis: R13.12 Dysphagia, Oropharyngeal Phase  R41.841 Cognitive-Communication Deficit    RECOMMENDATIONS   Diet:    Minced and Moist  Thin Liquids    Medication: whole floated in puree or applesauce   Compensatory Swallowing Strategies:   Assist feed  Slow rate of intake  Small bites/sips  Upright as possible for all oral intake   Therapeutic Intervention:   Patient/family education  Dysphagia treatment  Cognitive-linguistic treatment   Patient continues to require skilled intervention:  Yes. Recommend ongoing speech therapy services during 
GOALS:  LTG: Patient will maintain adequate hydration/nutrition with optimum safety and efficiency of swallowing function with PO intake without overt signs or symptoms of aspiration for the highest appropriate diet level.  STG: PROGRESSING 24  Patient will consume minced and moist textures and thin liquids without overt signs or symptoms of airway compromise.  Patient will safely ingest diet trials during therapeutic feedings with SLP without overt signs or symptoms of respiratory compromise in efforts to advance diet.  Patient will complete a Modified Barium Swallow study to fully assess physiology and anatomy of the swallow and determine safest appropriate diet and/or rehabilitation strategies, as medically indicated.  MET 10/31/24    LTG: Patient will increase neuro-linguistic abilities to increase safety and awareness in functional living environment   STG:  Patient will display orientation to temporal information with 80% accuracy given minimal cueing.   Patient will recall recent events with 80% accuracy given minimal cueing.      SPEECH LANGUAGE PATHOLOGY: DYSPHAGIA Daily Note #4    Acknowledge Order  I  Therapy Time  I   Charges     I  Rehab Caseload Tracker    NAME: Rashel Lane  : 1950  MRN: [id number]    ADMISSION DATE: 10/15/2024  PRIMARY DIAGNOSIS: Hemorrhagic shock (HCC)    ICD-10: Treatment Diagnosis: R13.12 Dysphagia, Oropharyngeal Phase    RECOMMENDATIONS   Diet:    Minced and Moist  Thin liquid     Medication: whole floated in puree or applesauce and non-oral   Compensatory Swallowing Strategies:   Assist feed  Slow rate of intake  Small bites/sips  Upright as possible for all oral intake   Therapeutic Intervention:   Patient/family education  Dysphagia treatment  Cognitive-linguistic treatment   Patient continues to require skilled intervention:  Yes. Recommend ongoing speech therapy services during this hospitalization.     Anticipated Discharge Needs: Ongoing speech therapy is 
Hg critical rpeorted at 5.8 which is in line with prior of 5.6. Will be getting blood x 2 units. Staff also tried to call family few times but not able to reach. Patient going for CT now.     Amos Farr MD    
Hourly rounding completed on this shift. No new complaints at this time. All needs met. Pt confused throughout shift but able to redirect. Sitter at bedside. Pt is currently resting in bed with unlabored breathing. Call light in reach.   
Hourly rounding completed on this shift. No new complaints at this time. All needs met. Pt confused throughout shift but able to redirect. Virtual sitter at bedside. Pt is currently resting in bed with unlabored breathing. Call light in reach.   
Hourly rounds complete this shift, no new complaints at this time, Patient urinating in brief, VSC sitter: bed in low, locked position, call light and bedside table within reach,  all needs met. Report to day shift nurse.    
IP Consult to Vascular Access Team  Consult performed by: Cheo Marsh RN  Consult ordered by: Grant Justice DO       2 attempts were made at PIV insertion w/ ultrasound guidance. Pt was uncooperative and became agitated during the process. No IV access established.  
IP Consult to Vascular Access Team  Consult performed by: Chuckie Heath RN  Consult ordered by: Chester Mckeon MD  Reason for consult: PIV insertion  Assessment/Recommendations: Ultrasound was used to find the vein which was compressible and without any ultrasound features of an artery or nerve bundle. Skin was cleaned and disinfected prior to IV puncture.  Under real-time ultrasound guidance peripheral access was obtained in the right AC using 22 G 1.75\" Convertroics Peripheral IV catheter after 1 attempt(s). No immediate complications noted. Patient tolerated the procedure well.  Refer to IV flowsheet for further documentation.             
Neurology Progress Note       History: This is a 74-year-old man with chronic alcohol use disorder who was admitted for a GI bleed while on oral anticoagulation.  He received 3 units of packed red blood cells.  The patient was having sundowning so received trazodone and Seroquel which made him overly somnolent.  The patient is also on cefepime.  CT scan head was done which shows atrophy.    On my encounter with the patient, he cannot cooperate with the examiner.  He is somnolent and will not open his eyes or follow any commands.    Interval history: He remains lethargic. MRI shows strokes.        Exam: Pertinent positives and negatives include:    Cognition: Somnolent.  Difficult to arouse.  Positive palmomental reflex  Language: None  Speech: Will not speak  Cranial nerve examination: Pupils are equal, round, and reactive to light  Motor examination: Does not participate in following commands but spontaneously moves all 4 extremities  Sensory examination: Cannot participate  Cerebellar examination: Cannot participate  Special testing: Muscle stretch reflexes are absent throughout.  Toes are downgoing to plantar stimulation    Imaging and review of data: I personally reviewed the patient's CT scan of the head.  He has severe cortical atrophy      Assessment and Plan: 74-year-old man with delirium.  Delirium is not surprising and is adequately explained by his ongoing medical issues.  First, he has severe cortical atrophy, likely representing dementia, which may be related to neurodegeneration, such as from Alzheimer's disease, or secondary to chronic alcohol use disorder.  This makes the patient prone to delirium with systemic issues, more sensitive to the side effect of medications, and slower to recover from infectious/hemodynamic/metabolic abnormalities.  In addition, the patient is on cefepime which can cause encephalopathy, and also has an underlying infection which can cause encephalopathy.  Further, the 
Neurology Progress Note       History: This is a 74-year-old man with chronic alcohol use disorder who was admitted for a GI bleed while on oral anticoagulation.  He received 3 units of packed red blood cells.  The patient was having sundowning so received trazodone and Seroquel which made him overly somnolent.  The patient is also on cefepime.  CT scan head was done which shows atrophy.    On my encounter with the patient, he cannot cooperate with the examiner.  He is somnolent and will not open his eyes or follow any commands.    Interval history: The patient had a rapid response overnight for worsening of mental status secondary to hypotension.  On my encounter with the patient he attempts to open his eyes to verbal stimulus and forces his eyes closed when the examiner attempts to open his eyes.  He does not follow commands.      Exam: Pertinent positives and negatives include:    Cognition: Somnolent.  Difficult to arouse.  Positive palmomental reflex  Language: None  Speech: Will not speak  Cranial nerve examination: Pupils are equal, round, and reactive to light  Motor examination: Does not participate in following commands but spontaneously moves all 4 extremities  Sensory examination: Cannot participate  Cerebellar examination: Cannot participate  Special testing: Muscle stretch reflexes are absent throughout.  Toes are downgoing to plantar stimulation    Imaging and review of data: I personally reviewed the patient's CT scan of the head.  He has severe cortical atrophy      Assessment and Plan: 74-year-old man with delirium.  Delirium is not surprising and is adequately explained by his ongoing medical issues.  First, he has severe cortical atrophy, likely representing dementia, which may be related to neurodegeneration, such as from Alzheimer's disease, or secondary to chronic alcohol use disorder.  This makes the patient prone to delirium with systemic issues, more sensitive to the side effect of 
Night NP notified Vascular Access was unable to get PIV on patient.  Also noted swelling in left bicep brachial where previous PIV was located Vascular Access suggested possible DVT.  
Notified by RN that patient has MAP of 55 and more lethargic. Reached out to rover and vascular access again for IV access. Put in for 1L LR bolus now. Repeat urine culture ordered, first one is contaminated. Rest of sepsis workup per notes. I have tried calling daughter with listed number twice without success.     Will also repeat labs and add vancomycin for more coverage. Continue with CTX.   
Notified by RN that patient has been somnolent on day shift today after receiving trazodone and seroquel last night. He was confused and jumping out of bed last night and was awakening to sternal rub today.  Upon assessment, patient grimaces to sternal rub but does not open his eyes or respond in any meaningful way.Pupils equal and reactive at 2++, however, no reaction from patient when I looked in left eye but squints eye shut when I try to look in right eye.No response to lifting left arm and it drops to the bed. Patient keeps right arm from being lifted and has control to the bed.  STAT ABG, labs and head CT.  ABG WNL. Will monitor lab results and CT scan    CT head negative. Consult to neurology. Patient not a candidate for TPA due to recent GI bleed requiring blood transfusions.  
Now Duyen - Assistant for today reports had large melana bowel movement about 4 pm and now with one. So clearly he is bleeding. Will need to get GI to see the patient and tell nighttime intensivist since it is 7 PM now. Note last 2 hg are 5.6 and last one was 5.8.     Amos Farr MD    
Occupational Therapy Note:    Attempted to see patient this AM for occupational therapy treatment  session. Patient undergoing EEG at time of attempt. Pt with planned MRI, discussed with RN and will re-attempt once MRI is complete. Will follow and re-attempt as schedule permits/patient available. Thank you,    Mindi Olson, OT    Rehab Caseload Tracker      
Occupational Therapy Note:    Attempted to see patient this AM for occupational therapy treatment  session. Per discussion with nursing, pt not clear for therapy at this time d/t being too lethargic. Will follow and re-attempt as schedule permits/patient available. Thank you,    MAIRA Park/S    Rehab Caseload Tracker      
Occupational Therapy Note:    Attempted to see patient this PM for occupational therapy evaluation session. Patient sleeping soundly, received zyprexa this am due to agitation, sitter present currently, RN request to HOLD at this time. Will follow and re-attempt as schedule permits/patient available. Thank you,    Mindi Olson, OT     Rehab Caseload Tracker   
Occupational Therapy Note:    Attempted to see patient this PM for occupational therapy treatment  session, however, pt off the floor at EGD. Will follow and re-attempt as schedule permits/patient available. Thank you,    Mindi Olson, OT     Rehab Caseload Tracker    
Occupational Therapy Note:  Therapist is discharging patient from OT at this time due to decline in medical status and transfer to ICU. Please reconsult OT when MD deems patient appropriate for continued services.   Thank you.  Blossom Sims, OTR/L    
PIV infiltrated this am. Patient in need of IV abx. PICC team at bedside to start new IV and patient is refusing/agitated. This nurse entered patient room. Patient does not appear agitated but does not want anything, refuses any medication to calm him down, \"I don't want nothing.\" MD notified and will see patient.  
Palliative Care Progress Note    Patient: Rashel Lane MRN: [id number]  SSN: [id number]    YOB: 1950  Age: 74 y.o.  Sex: male       Assessment/Plan:     Chief Complaint/Interval History: alert, said \"hey\"       Principal Diagnosis:    Altered Mental Status R41.82    Additional Diagnoses:   Debility, Unspecified  R53.81  Dysphagia  R13.10  Fatigue, Lethargy  R53.83  Frailty  R54  Counseling, Encounter for Medical Advice  Z71.9  Encounter for Palliative Care  Z51.5    Palliative Performance Scale (PPS)       Medical Decision Making:   Reviewed and summarized notes over last 24 hours   Discussed case with appropriate providers.  Reviewed laboratory and x-ray data over last 24 hours     Pt more alert today, resting in bed.  Daughter, Mary, at bedside.  Pt responded \"Hey\" when I said hello, but did not speak further.  Mary voiced frustration with pt's condition.  She feels strongly that the hospital caused his stroke.  Mary reports that pt was independent prior to this hospitalization, and she is having a very hard time coping with the change in his status.  Validated her feelings and provided support.  Mary is not interested in hospice for her father, stating \"he is not dying.\" She would like him to go to [facility].  Counseled that PT/OT/SLP have been consulted, and will determine if he can participate in rehab.  Counseled that if his dysphagia did not improve, a permanent feeding tube would need to be inserted.  Counseled that if pt goes to [facility] but does not progress to the point that he can return home independently, he will need to stay a facility of some sort (either private pay or via Medicaid).  Mary voiced understanding of all of this, but states she cannot afford private pay at a facility. Encouraged her to discussed with CM whether Medicaid is an option.  Assured her that he would not be discharged without a good plan in place.  She voiced understanding.  We will continue to follow.     Will 
Patient alert and oriented to self. Resting in bed at this time. Voiding via pure wick. Night shift nurse reported one BM overnight. IV patent, flushed, no blood return noted. and capped. Call light within reach.    0905  Medications given crushed in applesauce. Pt tolerated well.    1108  [facility] No answer at this time.    1215  Attempted to contact [place] [id number] for transfer of pt to facility for short term rehab.  
Patient alert, oriented, to self. Sitting up in chair, with the assistance of PT. Patient, needs pills crushed in pudding/applesauce. Appetite, decrease, consumed very little breakfast. Daughter Mary at bedside, encourage to bring in meals the patient might enjoy. Per overnight nurse patient, does like to drink vanilla ensure with ice cream. I did give him ensure with ice-cream, patient did consumed about 25% and one whole pudding. Voiding, via male-wick clear, gama color urine in the canister. Added comments to meal tray, to bring only vanilla ensure, patient does not like chocolate. Patient reporting mid epigastric pain on palpation, will notify attending.    
Patient has arrived to the floor on room air. Patient is alert and oriented to self, but easily reoriented. Patient care is ongoing. Dual skin assessment and physical assessment have been documented in the chart. The patient is unable to recall his medications. Rn attempted to call the patient's family, but she did not answer. Patient care is ongoing.    
Patient resting quietly, denies needs at this time, turned every 2 hours and prn, with Left arm elevated on a pillow. Hourly and PRN rounds completed. Bed in lowest and locked position, call light and personal items within reach.     BSSR to oncoming nurse.  
Patient was agreeable and tolerated several interventions overnight.  This RN was asked to watch patient's intake and to encourage him to drink/eat.  This RN was able to get patient to drink an ENSURE vanilla milkshake (12oz total/360mL).     Unable to dose po Protonix, as patient requires meds to be crushed (and, placed in applesauce). Could IV intervention be considered?    Patient received a bath, gown change and new sacral wound dressing and new male wick.  Complete linen change was completed, as well.    Pt is resting comfortably in bed without complaints. Hourly rounds completed and all needs are met. Bed is locked, low and call light is within reach and patient is encouraged to call for any need(s).   
Patient was very lathergic at the beginning of the shift to tolerate PO medication. Pt finally awakened around midnight and was very talkative and was oriented A and O x2. Pt new where he was and was oriented to person. Pt did not know what day it was. RN noticed that the pt is easily directable without prn zyprexa. Hourly rounds performed this shift. Bed lowered and locked. Call light within reach. All needs met at this time.   
Per nursing patient with ulcer and stopped by GI. Unfortunately, do not see a report yet. Awaiting report. Repeat Hg sent and was 11.4. no more melena and BP is in 150's.     Amos Farr MD    
Physical Therapy Note:    Attempted to see patient this AM for physical therapy evaluation session. Patient's daughter requesting allow patient to rest prior to PT evaluation.  States that he didn't sleep much last night. Will follow and re-attempt as schedule permits/patient available. Thank you,    SHARRI Rivera, PT     Rehab Caseload Tracker    
Physical Therapy Note:    Attempted to see patient this AM for physical therapy treatment  session, however, per discussion with nursing, pt not appropriate for therapy at this time, pt too lethargic. Will follow and re-attempt as schedule permits/patient available. Thank you,    MIGUEL DEJESUS, PT     Rehab Caseload Tracker    
Physical Therapy Note:    Attempted to see patient this AM for physical therapy treatment  session. Patient undergoing EEG, with planned MRI, discussed with RN and will re-attempt once MRI is complete. . Will follow and re-attempt as schedule permits/patient available. Thank you,    HERNÁN TUCKER, [facility]     Rehab Caseload Tracker    
Physical Therapy Note:    Attempted to see patient this PM for physical therapy evaluation session. Patient sleeping soundly, received zyprexa this am due to agitation, sitter present currently, RN request to HOLD at this time. Will follow and re-attempt as schedule permits/patient available. Thank you,    Daphney Santamaria, PT     Rehab Caseload Tracker   
Physical Therapy Note:    Attempted to see patient this PM for physical therapy treatment  session, however, pt off the floor at EGD. Will follow and re-attempt as schedule permits/patient available. Thank you,    MIGUEL DEJESUS, PT     Rehab Caseload Tracker    
Physical Therapy Note:    Therapist is discontinuing acute PT services secondary to transfer to the ICU. Please re-consult acute PT services when medically appropriate. Thank you,    Dara Hensley, PT, DPT      
Placed 20g 1.75in PIV in left brachial vein with ultrasound assistance.      
Pt has received 1 of 2 units of blood, no adverse effects, BP improving with MAP greater than 60. HR also has come down since PRBC administered. Report to BORIS Nichole. Hourly & PRN rounds completed. Bed in lowest and locked position, call light within reach. See flowsheet for Neuro checks, pt did repeat his name and say \"hi Mary\" to his daughter over the phone tonight.     BSSR given to oncoming nurse.   
Pt is now sleeping after dose of zyprexa given.   
Pt medicated per MAR. He was A&Ox2-3 during the beginning of the shift. Pt fell asleep around 23:00. Around 03:20, this RN and the PCT noticed that pt was unresponsive, even to sternum rub and pen against fingernail. Pt was then placed on 3L NC after Spo2 was found to be 86% on RA. SpO2 running between 96-98% currently. This RN believes that his first dose of trazodone along with Seroquel caused this affect on pt. Pt began to become more arousable as the night went on, now responsive to pain. Pt moving legs, trying to swat off the pulse oximeter from his finger. All known needs met at this time. Bed locked and lowered with call light within reach.   
Pt medicated per MAR. Pt remained disoriented x4 tonight. He received PRN melatonin and slept for about an hour. Pt received PO Zyprexa after he was awakened and continuously tried to get out of bed, stating he was going to get on the bus. PO zyprexa seemed to work somewhat, however, pt was still attempting to climb out of bed and having flight of ideas. IV Zyprexa was then administered. However, this RN noticed that IV zyprexa seemed to make pt's symptoms worse as he started hallucinating, having conversations with \"people\" that were not there, asking does staff see what he sees. Pt went to sleep intermittently around 03:00. VSC remains bedside. All known needs met at this time. Bed locked and lowered with call light within reach.   
Pt removed NG tube near beginning of shift.NG tube replaced and soft mitts applied.  Confirmation Xray performed about 2130 but not read until after 0400.  NG tube now to low intermittent suction.  Pt becoming more interactive this shift.  Able to follow some commands intermittently, able to state his name, and opening eyes but gaze is unfocused and pupil response remains sluggish.  
Pt resting with eyes closed, somnolent, NAD. NGT placed per order, pt to CT after Gastrografin instilled. Pt had MRI this am. Hourly and PRN rounds completed. Bed in low and locked position, call light and personal items within reach.    BSSR to oncoming nurse.   
Pt was A&Ox3 this morning he was unsure of the date but knew his name, where he was & why he was here. This afternoon however he was only A&O to self & easily redirectable. Pt went to US this morning, see Results. Speech was consulted today and advanced his diet to easy to chew & tolerated it well.    Pt is resting in bed without any complaints. Hourly rounds completed and all needs met. Bed is low, locked,bed alarm on, VSC on and in room, call light is in reach and pt is encouraged to call for assistance.  
Pt was lethargic but responsive for the majority of the shift. He woke up for dinner and ate >75% of his meal. Pt is resting in bed without any complaints. Hourly rounds completed and all needs met. Bed is low, locked, bed alarm on, VSC on, in room and no obstacles in view of camera, call light is in reachand pt is encouraged to call for assistance.  
RN called to room for patient's low bp. Patient is minimally responsive. Sitter at bedside states patient had been like that for about 10 min. MD notified and bolus ordered. Patient with no IV access. Vascular access notified. Vascular access at bedside at this time to attempt IV access. LR bolus prepared to give.   
Rashel Lane is 74 y.o. y/o male     74-year-old male past medical history of pneumonia, CVA and recent EGD showing esophagitis and gastric ulcer along with visible vessel. Now developed black output from NG tube for which we were reconsulted. Currently getting transfusions. EGD completed on 10/28: 2 cm HH, 2 cm ulcer in antrum. Dobhoff was also placed.    Today: Patient resting at bedside; tube feeds running. No issues with TF per RN and no BM since procedure.    Labs: Hgb 8.9 (9.4).     Today:     PE:   Vitals:    10/29/24 1050   BP: 94/61   Pulse: 64   Resp: 19   Temp: 98.1 °F (36.7 °C)   SpO2: 100%      General:  The patient appears well-nourished, and is in no acute distress. Resting at bedside.     Assessment and Plan:   #Melena: Persistent antral ulcer on EGD yesterday; Hgb stable this AM. No BM per nurse.     Electronically signed by Olga Sainz PA-C.   
Rashel Lane is 74 y.o. y/o male     Patient admitted for PNA, CVA, GI bleeding, delirium. We completed EGD on 10/17 showing LA Grade B esophagitis and gastric ulcer with visible vessel treated with epi.     Today: Reconsulted as Hgb declined to 4.8 from 8 yesterday; some black output in NGT. Bun 30 Cr 1.39. Has been off Xarelto for past 1.5 weeks. Patient is confused; BL mitts in place.     PE:   Vitals:    10/27/24 1330   BP: 114/66   Pulse: (!) 110   Resp: 18   Temp: 98.3 °F (36.8 °C)   SpO2:       General:  The patient appears well-nourished; ill appearing. BL mitts in place  HEENT:  Normocephalic, atraumatic. No sclerae icterus.   Abdomen: NGT with watery black output.     Assessment and Plan:   #Anemia/Black output in NGT: Recent EGD on 10/17 for melena showing esophagitis and gastric ulcer with visible vessel; treated with epi. Now with Hgb drop from 8 to 4.8 and black output from NGT. Discussed with MD, will plan for repeat EGD tomorrow. Will also change NGT to dobhoff during EGD. Attempted to call daughter, but unable to reach. Hold tube feedings at MN. Hgb needs to be >7 for endoscopy.     Electronically signed by Olga Sainz PA-C.    
TF running. Urine is brown and clear. Speech saw pt today during shift as well as PT/OT. Hgb stable during shift. Pt only alert to self during shift.    Rounds performed throughout shift. Pt denies needs at this time. Bed in low position, locked and call light/personal items within reach.   
TRANSFER - IN REPORT:    Verbal report received from BORIS Boyce on Rashel [address]  being received from CCU for routine progression of patient care      Report consisted of patient's Situation, Background, Assessment and   Recommendations(SBAR).     Information from the following report(s) Nurse Handoff Report was reviewed with the receiving nurse.    Opportunity for questions and clarification was provided.      Assessment completed upon patient's arrival to unit and care assumed.    
TRANSFER - IN REPORT:    Verbal report received from BORIS Briggs on Rashel [address]  being received from  620 for ordered procedure      Report consisted of patient's Situation, Background, Assessment and   Recommendations(SBAR).     Information from the following report(s) Procedure Verification was reviewed with the receiving nurse.    Opportunity for questions and clarification was provided.        
TRANSFER - IN REPORT:    Verbal report received from BORIS Villalta on Rashel [address]  being received from Endo for routine post-op      Report consisted of patient's Situation, Background, Assessment and   Recommendations(SBAR).     Information from the following report(s) Nurse Handoff Report and Surgery Report was reviewed with the receiving nurse.    Opportunity for questions and clarification was provided.      Assessment will be completed upon patient's arrival to unit and care will be assumed.    
TRANSFER - IN REPORT:    Verbal report received from BORIS Villalta on Rashel Lane being received from 3106 for ordered procedure      Report consisted of patient’s Situation, Background, Assessment and Recommendations(SBAR).     Information from the following report(s) SBAR, Kardex, Intake/Output, MAR, and Recent Results was reviewed with the receiving nurse.    Opportunity for questions and clarification was provided.      
TRANSFER - OUT REPORT:    Verbal report given to BORIS Louie on Rashel [address]  being transferred to GI Lab for ordered procedure       Report consisted of patient's Situation, Background, Assessment and   Recommendations(SBAR).     Information from the following report(s) Nurse Handoff Report, Adult Overview, Intake/Output, Recent Results, and Neuro Assessment was reviewed with the receiving nurse.           Lines:   Peripheral IV 10/27/24 Posterior;Right Forearm (Active)   Site Assessment Clean, dry & intact 10/27/24 1901   Line Status Flushed;Normal saline locked 10/27/24 1901   Line Care Ports disinfected 10/27/24 1901   Phlebitis Assessment No symptoms 10/27/24 1901   Infiltration Assessment 0 10/27/24 1901   Alcohol Cap Used No 10/27/24 1901   Dressing Status Clean, dry & intact 10/27/24 1901   Dressing Type Transparent 10/27/24 1901        Opportunity for questions and clarification was provided.      Patient will be transported with:  Transport        
TRANSFER - OUT REPORT:    Verbal report given to Brigitte ESCALANTE on Rashel [address]  being transferred to [facility] for routine progression of patient care       Report consisted of patient's Situation, Background, Assessment and   Recommendations(SBAR).     Information from the following report(s) Nurse Handoff Report was reviewed with the receiving nurse.           Lines:   Peripheral IV 10/27/24 Posterior;Right Forearm (Active)   Site Assessment Clean, dry & intact 10/28/24 1350   Line Status Infusing 10/28/24 1350   Line Care Connections checked and tightened 10/28/24 1204   Phlebitis Assessment No symptoms 10/28/24 1350   Infiltration Assessment 0 10/28/24 1350   Alcohol Cap Used Yes 10/28/24 1204   Dressing Status Clean, dry & intact 10/28/24 1350   Dressing Type Transparent 10/28/24 1204        Opportunity for questions and clarification was provided.      Patient transported with:         
TRANSFER - OUT REPORT:    Verbal report given to Hugh ESCALANTE on [address]  being transferred to [facility] for routine progression of patient care       Report consisted of patient's Situation, Background, Assessment and   Recommendations(SBAR).     Information from the following report(s) Nurse Handoff Report was reviewed with the receiving nurse.           Lines:   CVC  10/16/24 Right Internal jugular (Active)   $Central Line Insertion $ Yes 10/16/24 2101   Central Line Being Utilized Yes 10/17/24 1930   Criteria for Appropriate Use Hemodynamically unstable, requiring monitoring lines, vasopressors, or volume resuscitation 10/17/24 1930   Site Assessment Clean, dry & intact 10/17/24 1930   Phlebitis Assessment No symptoms 10/17/24 1930   Infiltration Assessment 0 10/17/24 1930   Color/Movement/Sensation Capillary refill less than 3 sec 10/17/24 1930   Proximal Lumen Color/Status White;Infusing 10/17/24 1930   Medial Lumen Status Blue;Infusing 10/17/24 1930   Distal Lumen Color/Status Brown;Infusing 10/17/24 1930   Line Care Cap changed;Connections checked and tightened;Line pulled back;Ports disinfected;Tubing changed 10/17/24 1930   Alcohol Cap Used Yes 10/17/24 1930   Date of Last Dressing Change 10/16/24 10/17/24 1930   Dressing Type Transparent w/CHG gel 10/17/24 1930   Dressing Status New dressing applied;Clean, dry & intact 10/17/24 1930   Dressing Intervention New 10/17/24 1930        Opportunity for questions and clarification was provided.      Patient transported with:  Registered Nurse        
The ending of Daylight Saving Time occurred at 0200 hrs. Documentation of patient care and medications administered is done with respect to the time change.   
This RN contacting PA May Jonatan via secure message due to patient's increased agitation and restlessness in concerns to patient safety. PA Jonatan made aware of patient orientation status (A&Oxself) and difficulty to be redirected despite efforts of staff members and virtual  at bedside. See orders. Care ongoing.   
This nurse was able to give PO meds with applesauce. He tolerated well.    With feeding, patient ate small amount of breakfast. He tolerated well.  
US Guided PIV access-    Ultrasound was used to find the vein which was compressible and without any ultrasound features of an artery or nerve bundle. Skin was cleaned and disinfected prior to IV puncture.  Under real-time ultrasound guidance peripheral access was obtained in the right forearm using 22 G 1.75\" Peripheral IV catheter after 2 attempt(s). Blood return was present and IV flushed without difficulty with no clinical signs of infiltration. IV dressing applied and no immediate complications noted. Patient tolerated the procedure well.       
Ultrasound called stating pt was uncooperative & guarding his MD NAOMI notified.     
VAT consulted to assess pt for PIV to remove central line. Pt became combative while attempting to assess vessels with ultrasound. Will attempt tomorrow.  
Virtual  was ordered for the patient as he has attempted to leave the floor and is unsteady on his feet. Patient care is ongoing.   
Zyprexa given at this time due to pt trying o climb of bed. Bed is low/locked with side rails up and alarm on. Pericare of urine given, diaper changed.  
Perseverative chewing and piecemeal deglutition noted with solids.     With thin liquid swallow initiation is delayed to the level of the pyriform sinuses. Adequate hyolaryngeal movement and epiglottic retroflexion. Epiglottis makes contact with NG during swallow but it does not appear to impede movement. There were two instances of shallow transient penetration. No aspiration with all trials. Mild residue noted diffusely throughout oropharynx. Adequate movement of bolus through cervical esophagus.     Of note patient with cough at end of study that was not correlated with contrast in the laryngeal vestibule/trachea.     Recommend minced and moist/thin liquid. Speech therapy will continue to follow for potential advancement pending improvement in mentation.   GENERAL    Subjective: Patient present in radiology suite, agreeable to study. RN Aleena present.     Recent Information/Background: Rashel Lane is a 74 y.o. male with medical history of hypertension, poor medication compliance, frequent falls secondary to EtOH, peripheral neuropathy, tobacco use disorder, hyperlipidemia and osteoarthritis who presented after a fall at home and concern for GI bleed.     History of Present Injury/Illness: Mr. Lane  has a past medical history of Anxiety, Dermatophytosis of nail, Diabetes (HCC), Diabetic polyneuropathy (HCC), Heart murmur, Hepatic encephalopathy (HCC), Hyperlipidemia, Hypertension, Metatarsal stress fracture, Metatarsalgia, Osteoarthritis, Peripheral edema, Psychiatric disorder, and UTI (urinary tract infection).. He also  has a past surgical history that includes laparotomy (2012); hernia repair (2013); other surgical history; neurological surgery; Upper gastrointestinal endoscopy (N/A, 10/17/2024); and Upper gastrointestinal endoscopy (N/A, 10/28/2024).  Precautions/Allergies: Penicillins     Observations:  Alertness: Alert  Voice: WFL  Speech: Dysarthria    Prior Dysphagia History: None upon chart review. 
and concern for GI bleed.     History of Present Injury/Illness: Mr. Lane  has a past medical history of Anxiety, Dermatophytosis of nail, Diabetes (HCC), Diabetic polyneuropathy (HCC), Heart murmur, Hepatic encephalopathy (HCC), Hyperlipidemia, Hypertension, Metatarsal stress fracture, Metatarsalgia, Osteoarthritis, Peripheral edema, Psychiatric disorder, and UTI (urinary tract infection).. He also  has a past surgical history that includes laparotomy (2012); hernia repair (2013); other surgical history; neurological surgery; and Upper gastrointestinal endoscopy (N/A, 10/17/2024).  Precautions/Allergies: Penicillins     Observations:  Alertness: Alert  Voice: WFL  Speech: Intelligible  Expressive Language: Fluent  Receptive Language: Follows basic commands with verbal and visual cues.   Cognition: Patient oriented to self, current location as hospital. Responses not appropriate to context when questions of aspects of time.     Of note, patient's CT from head from current admission without acute abnormality. CT of head from 2022 at outside facility does state \"memory loss\" as reason for imaging and generalized atrophy noted on scan. No family present thus unable to determine patient's prior level of function with cognition as patient is a poor historian.     Prior Dysphagia History: None upon chart review. Patient on full liquid diet due to GI dysfunction during admission though diet appropriate to be advanced per medical provider.   Prior Instrumental Assessment: N/A    Current Diet:  Full Liquid Diet    Respiratory Status: Room air    Pain:  Patient does not c/o pain    OBJECTIVE    Oral Motor Assessment: Labial: no impairment  Lingual: no impairment  Dentition: edentulous  Oral Hygiene: adequate  Vocal quality: WFL  Oropharyngeal Assessment: Patient agreeable to PO trials consuming 6 oz of thin liquids via straw (including 3+ oz of thin liquids via straw in sequential sips), purees x's 2, solids/ramses crackers 
was in the ICU until 10/17.  He underwent upper endoscopy which revealed LA grade B esophagitis with with no bleeding, clotted blood in the gastric body, nonbleeding gastric ulcer with visible vessel treated with bipolar cautery.      Subjective & 24hr Events:   Overnight, patient was given Zyprexa and additionally Geodon.  His central line was removed after peripheral line was placed.  CMP and CBC currently pending.  He was able to respond to questions today.  Alert, oriented only to person however.      Assessment & Plan:     Hemorrhagic shock-resolved  Gastrointestinal hemorrhage  Chronic gastric ulcer with hemorrhage  Melena  Esophagitis without bleeding  Acute blood loss anemia  - Hemorrhagic shock demonstrated by greater than 3 point drop in hemoglobin with significant hypotension, CTAP with possible hemorrhage proximal, transverse and descending colon 10/16  -Upper endoscopy evening of 10/17/2024.  -IV Protonix 40 mg twice daily for 72 hours EOT 10/20 then transition to 40 mg p.o. twice daily  - Hold anticoagulation completely for 72 hours  - GI stated he would likely continue to pass melanotic stool, monitor hemoglobin closely  - GI signed off    Delirium with fluctuating severity  - Has been present since ICU stay  - Zyprexa as needed  -Etiology unknown, most likely in the setting of ICU stay and hospitalization  - Family member at bedside today was able to calm patient.  - Checking folate and B12 as patient has a history EtOH, potentially under nourished    Acute kidney injury  - 1.95 on admission, down trended to 1.18  - Continue to hold nephrotoxic agents    Paroxysmal atrial fibrillation  - Rate controlled  - Lopressor restarted  - Continue to hold Plavix and Xarelto at this time  - [id number] score 6, HAS-BLED score 5    CAD  - Continuing Lipitor  - Continuing to hold Plavix in the setting of GI bleed    Hypertension  - Holding home lisinopril  - Continue home amlodipine 5 mg daily    Moderate 
             Telemetry (if present):           Hospital Problems:  Principal Problem:    Hemorrhagic shock (HCC)  Active Problems:    Diabetes (HCC)    Coronary artery disease involving native coronary artery of native heart    Atrial fibrillation (HCC)    Septic shock (HCC)    Leukocytosis    Constipation    Lactic acid increased    Moderate malnutrition (HCC)    Dehydration    Gastrointestinal hemorrhage    Acute encephalopathy    Acute blood loss anemia    HAM (acute kidney injury) (HCC)    Septicemia (HCC)    Delirium with fluctuating severity  Resolved Problems:    Severe sepsis (HCC)      Objective:   Patient Vitals for the past 24 hrs:   Temp Pulse Resp BP SpO2   10/23/24 0751 99 °F (37.2 °C) 82 16 103/62 97 %   10/23/24 0353 99.1 °F (37.3 °C) 88 19 94/85 96 %   10/22/24 1936 98.6 °F (37 °C) 71 15 106/76 97 %   10/22/24 1823 97.7 °F (36.5 °C) 78 16 101/67 99 %       Oxygen Therapy  SpO2: 97 %  Pulse via Oximetry: 78 beats per minute  Pulse Oximeter Device Mode: Continuous  Pulse Oximeter Device Location: Right, Hand  O2 Device: None (Room air)  O2 Flow Rate (L/min): 3 L/min    Estimated body mass index is 20.41 kg/m² as calculated from the following:    Height as of this encounter: 1.702 m (5' 7\").    Weight as of this encounter: 59.1 kg (130 lb 4.7 oz).    Intake/Output Summary (Last 24 hours) at 10/23/2024 1113  Last data filed at 10/23/2024 0353  Gross per 24 hour   Intake 120 ml   Output 800 ml   Net -680 ml         Physical Exam:     Constitutional: No acute distress noted, chronically ill-appearing  EENMT: oral mucosa moist  Respiratory: symmetric chest rise. Lungs are clear to auscultation bilaterally.   Cardiovascular:  RRR without M,G,R. There is 0 lower extremity edema.  Musculoskeletal: warm without cyanosis. Normal muscle tone.   Skin:  no jaundice or rashes, no visible wounds   Neurologic: No focal neurological deficit, alert and oriented.    I have personally reviewed labs and tests:  Recent 
Alarm  Restrictions/Precautions: Fall Risk, Bed Alarm     MOBILITY: I Mod I S SBA CGA Min Mod Max Total  NT x2 Comments:   Bed Mobility    Rolling [] [] [] [] [] [x] [] [] [] [] [x]    Supine to Sit [] [] [] [] [] [x] [] [] [] [] [x]    Scooting [] [] [] [] [] [x] [] [] [] [] [x]    Sit to Supine [] [] [] [] [] [] [x] [] [] [] [x]    Transfers    Sit to Stand [] [] [] [] [] [x] [x] [] [] [] [x]    Bed to Chair [] [] [] [] [] [] [] [] [] [] []    Stand to Sit [] [] [] [] [] [x] [x] [] [] [] [x]     [] [] [] [] [] [] [] [] [] [] []    I=Independent, Mod I=Modified Independent, S=Supervision, SBA=Standby Assistance, CGA=Contact Guard Assistance,   Min=Minimal Assistance, Mod=Moderate Assistance, Max=Maximal Assistance, Total=Total Assistance, NT=Not Tested    BALANCE: Good Fair+ Fair Fair- Poor NT Comments   Sitting Static [] [] [x] [] [] []    Sitting Dynamic [] [] [] [x] [] []              Standing Static [] [] [] [x] [x] []    Standing Dynamic [] [] [] [] [x] []      GAIT: I Mod I S SBA CGA Min Mod Max Total  NT x2 Comments:   Level of Assistance [] [] [] [] [] [] [] [] [] [x] []    Distance   feet    DME N/A    Gait Quality N/A    Weightbearing Status      Stairs      I=Independent, Mod I=Modified Independent, S=Supervision, SBA=Standby Assistance, CGA=Contact Guard Assistance,   Min=Minimal Assistance, Mod=Moderate Assistance, Max=Maximal Assistance, Total=Total Assistance, NT=Not Tested    PLAN:   FREQUENCY AND DURATION: 3 times/week for duration of hospital stay or until stated goals are met, whichever comes first.    TREATMENT:   TREATMENT:   Co-Treatment PT/OT necessary due to patient's decreased overall endurance/tolerance levels, as well as need for high level skilled assistance to complete functional transfers/mobility and functional tasks  Therapeutic Activity (25 Minutes): Therapeutic activity included Rolling, Supine to Sit, Sit to Supine, Scooting, Transfer Training, Sitting balance , and Standing 
Confused, impaired memory, problem solving, processing, attention, safety   Perception []       MOBILITY: I Mod I S SBA CGA Min Mod Max Total  NT x2 Comments:   Bed Mobility    Rolling [] [] [] [] [] [] [] [] [] [] []    Supine to Sit [] [] [] [] [x] [] [] [] [] [] []    Scooting [] [] [] [] [x] [] [] [] [] [] []    Sit to Supine [] [] [] [] [] [] [] [] [] [] []    Transfers    Sit to Stand [] [] [] [] [] [x] [] [] [] [] []    Bed to Chair [] [] [] [] [] [x] [] [] [] [] []    Stand to Sit [] [] [] [] [] [x] [] [] [] [] []    Tub/Shower [] [] [] [] [] [] [] [] [] [] []     Toilet [] [] [] [] [] [] [] [] [] [] []      [] [] [] [] [] [] [] [] [] [] []    I=Independent, Mod I=Modified Independent, S=Supervision/Setup, SBA=Standby Assistance, CGA=Contact Guard Assistance, Min=Minimal Assistance, Mod=Moderate Assistance, Max=Maximal Assistance, Total=Total Assistance, NT=Not Tested    ACTIVITIES OF DAILY LIVING: I Mod I S SBA CGA Min Mod Max Total NT Comments   BASIC ADLs:              Upper Body Bathing  [] [] [] [] [] [] [] [] [] []     Lower Body Bathing [] [] [] [] [] [] [] [] [] []     Toileting [] [] [] [] [] [] [] [] [] []    Upper Body Dressing [] [] [] [] [] [] [] [] [] []    Lower Body Dressing [] [] [] [] [] [x] [] [] [] []    Feeding [] [] [] [] [] [] [] [] [] []    Grooming [] [] [] [] [] [x] [] [] [] []    Personal Device Care [] [] [] [] [] [] [] [] [] []    Functional Mobility [] [] [] [] [] [x] [] [] [] []    I=Independent, Mod I=Modified Independent, S=Supervision/Setup, SBA=Standby Assistance, CGA=Contact Guard Assistance, Min=Minimal Assistance, Mod=Moderate Assistance, Max=Maximal Assistance, Total=Total Assistance, NT=Not Tested    PLAN:   FREQUENCY/DURATION   OT Plan of Care: 3 times/week for duration of hospital stay or until stated goals are met, whichever comes first.    PROBLEM LIST:   (Skilled intervention is medically necessary to address:)  Decreased ADL/Functional Activities  Decreased 
diet due to GI dysfunction during admission though diet appropriate to be advanced per medical provider. Advanced to easy to chew 10/23/24 though since made NPO with concerns for aspiration pneumonia/new CVA.   Prior Instrumental Assessment: N/A    Current Diet:  NPO    Respiratory Status: Room air    Pain:  Patient does not c/o pain  No intervention provided as no pain observed  Patient satisfied     OBJECTIVE    Oropharyngeal Assessment: Patient alert, requesting water. x 3 trials of thin liquid via teaspoon, unremarkable. Patient consumed 8-16 oz of thin liquid via straw. Notable impulsivity noted with intake, intermittent cough response observed. Adequate oral acceptance of applesauce, complete clearance. Suspected reduced hyolaryngeal elevation/excursion upon palpation but this would have to be visualized on Modified Barium Swallow Study to be confirmed. X 1 cough observed. Functional consumption of mildly thick liquid (nectar), no overt indications of airway compromise. Patient reports he is agreeable to completion of Modified Barium Swallow Study tomorrow (refused option for this afternoon). Educated patient on importance of study in objective assessment of swallow function, he verbalized understanding. Discussed results/plan with Aleena ESCALANTE, Sandy ESCALANTE, Martina FLORES, and Dr. Almonte. Oral cavity clear following conclusion of PO.    Cognitive-Communication: Patient is alert, oriented to self and current location. Not oriented to temporal information, reported year was 2008. Able to follow one step commands with 100% accuracy given moderate verbal cues. No anomia appreciated in conversational speech or structured tasks. Intact repetition of words though difficulty with phrases. Impaired recall of recent events. Impaired judgement/reasoning, difficulty with understanding rationale behind Modified Barium Swallow Study.     Dysphagia Outcome and Severity Scale (GELY)  Score: 1 Description   [] 7 Normal in all situations   [] 
present thus unable to determine patient's prior level of function with cognition as patient is a poor historian.     Prior Dysphagia History: None upon chart review. Patient previously on full liquid diet due to GI dysfunction during admission though diet appropriate to be advanced per medical provider. Advanced to easy to chew 10/23/24 though since made NPO with concerns for aspiration pneumonia/new CVA.   Prior Instrumental Assessment: N/A    Current Diet:  NPO    Respiratory Status: Room air    Pain:  Patient does not c/o pain  No intervention provided as no pain observed  Patient satisfied     OBJECTIVE    Oropharyngeal Assessment: Patient alert, agreeable to limited PO trials due to nausea. Consumed 2-4 oz of thin liquid with adequate siphoning from straw, no overt indications of airway compromise. Accepting of x 3 trials of mashed potatoes, mildly prolonged oral phase though complete clearance without residue. No pharyngeal symptoms observed or reported. Patient requesting to discontinue PO, oral cavity clear.     Cognitive-Communication: Patient deferred this date stating \"I really don't feel like answering any of those questions.\"    Dysphagia Outcome and Severity Scale (GELY)  Score: 4 Description   [] 7 Normal in all situations   [] 6 Within Functional Limits/ Modified independent   [] 5 Mild Dysphagia: Distant Supervision. May need one diet consistency      restricted.   [x] 4 Mild-Moderate Dysphagia: Intermittent supervision/cuing. One-two diet    consistencies restricted.   [] 3 Moderate Dysphagia: Total assistance, supervision, or strategies.       Two or more diet consistencies restricted.   [] 2 Moderate-Severe Dysphagia: Maximum assistance or maximum use     of strategies with partial po intake   [] 1 Severe dysphagia- NPO. Unable to tolerate any po safely     PLAN    Duration/Frequency: Continue to follow patient 3x/week for duration of hospitalization and/or until goals met    Rehabilitation 
Tested    ACTIVITIES OF DAILY LIVING: I Mod I S SBA CGA Min Mod Max Total NT Comments   BASIC ADLs:              Upper Body Bathing  [] [] [] [] [] [] [] [] [] [x]     Lower Body Bathing [] [] [] [] [] [] [] [] [] [x]     Toileting [] [] [] [] [] [] [] [] [] [x]    Upper Body Dressing [] [] [] [] [] [] [] [] [] [x]    Lower Body Dressing [] [] [] [] [] [] [] [] [] [x]    Feeding [] [] [] [] [] [] [] [] [] [x]    Grooming [] [] [] [] [] [] [] [] [x] [] Wash face edge of bed    Personal Device Care [] [] [] [] [] [] [] [] [] [x]    Functional Mobility [] [] [] [] [] [] [] [] [x] [] X2 bed mobility   I=Independent, Mod I=Modified Independent, S=Supervision/Setup, SBA=Standby Assistance, CGA=Contact Guard Assistance, Min=Minimal Assistance, Mod=Moderate Assistance, Max=Maximal Assistance, Total=Total Assistance, NT=Not Tested    PLAN:   FREQUENCY/DURATION   OT Plan of Care: 3 times/week for duration of hospital stay or until stated goals are met, whichever comes first.    PROBLEM LIST:   (Skilled intervention is medically necessary to address:)  Decreased ADL/Functional Activities  Decreased Activity Tolerance  Decreased Balance  Decreased Cognition  Decreased Coordination  Decreased Gait Ability  Decreased Safety Awareness  Decreased Strength  Decreased Transfer Abilities   INTERVENTIONS PLANNED:  (Benefits and precautions of occupational therapy have been discussed with the patient.)  Self Care Training  Therapeutic Activity  Therapeutic Exercise/HEP  Neuromuscular Re-education  Manual Therapy  Education         TREATMENT:     EVALUATION: RE EVALUATION: (Untimed Charge)  The initial evaluation charge encompasses clinical chart review, objective assessment, interpretation of assessment, and skilled monitoring of the patient's response to treatment in order to develop a plan of care.     TREATMENT:   Co-Treatment PT/OT necessary due to patient's decreased overall endurance/tolerance levels, as well as need for high 
To: Patient  Education Provided: Role of Therapy;Plan of Care    TIME IN/OUT:  Time In: 0903  Time Out: 0929  Minutes: 26    Julia Oro, PT    
bleed    Acute kidney injury  Initially resolved, creatinine worsened again on 10/24 to 1.9  Encourage p.o. oral hydration  Continue to hold nephrotoxic agents  Monitor renal functions    Paroxysmal atrial fibrillation  Rate controlled  Lopressor restarted  Anticoagulation on hold due to GI bleed  [id number] score 6, HAS-BLED score 5    CAD  Continuing Lipitor  Continuing to hold Plavix     Hypertension  Holding home lisinopril in the setting of HAM and borderline blood pressure  Continue home amlodipine 5 mg daily    Moderate malnutrition, POA  Debility, likely disease related  Consulting dietary for caloric recommendations.  PT/OT    Palliative care encounter:  Palliative care consulted to discuss goal of care    Patient is critically ill.  Without the interventions noted, there is a high probability of imminent acute organ impairment or life-threatening deterioration.  Total critical care time spent: 40 minutes.    Critical care time includes time spent at bedside performing history and exam, performing chart review, discussing findings and treatment plan with patient and/or family, discussing patient with consultants and colleagues, ordering and reviewing pertinent laboratory and radiographic evaluations, and discussing patient with nursing staff. Time excludes procedures.      Anticipated Discharge Arrangements:   Skilled Nursing Facility    PT/OT evals ordered?  Therapy evals ordered  Diet:  Diet NPO Exceptions are: Sips of Water with Meds  ADULT TUBE FEEDING; Nasogastric; Standard without Fiber; Continuous; 15; Yes; 10; Q 6 hours; 65; 115; Q 4 hours  VTE prophylaxis: SCD's   Code status: Full Code      Non-peripheral Lines and Tubes (if present):      NG/OG/NJ/NE Tube Nasogastric 16 fr Left nostril (Active)       External Urinary Catheter (Active)              Telemetry (if present):           Hospital Problems:  Principal Problem:    Hemorrhagic shock (HCC)  Active Problems:    Diabetes (HCC)    Acute 
impairment, swallowing difficulty as evidenced by NPO or clear liquid status due to medical condition, swallow study results  Moderate malnutrition, In context of chronic illness related to  (predicted inadequate oral intake) as evidenced by Criteria as identified in malnutrition assessment  Nutrition Interventions:   Food and/or Nutrient Delivery: Modify Tube Feeding     Coordination of Nutrition Care: Continue to monitor while inpatient      Goals:   Previous Goal Met: Progressing toward Goal(s)  Active Goal: Tolerate nutrition support at goal rate, within 2 days       Nutrition Monitoring and Evaluation:      Food/Nutrient Intake Outcomes: Enteral Nutrition Intake/Tolerance  Physical Signs/Symptoms Outcomes: Biochemical Data, Diarrhea, Fluid Status or Edema, Weight    Discharge Planning:    Too soon to determine    LAURA NASCIMENTO, RD      
  Result Value Ref Range    Sodium 136 136 - 145 mmol/L    Potassium 4.3 3.5 - 5.1 mmol/L    Chloride 106 98 - 107 mmol/L    CO2 20 20 - 29 mmol/L    Anion Gap 10 7 - 16 mmol/L    Glucose 76 70 - 99 mg/dL    BUN 14 8 - 23 MG/DL    Creatinine 1.06 0.80 - 1.30 MG/DL    Est, Glom Filt Rate 74 >60 ml/min/1.73m2    Calcium 8.0 (L) 8.8 - 10.2 MG/DL   CBC with Auto Differential    Collection Time: 11/03/24  9:31 AM   Result Value Ref Range    WBC 7.6 4.3 - 11.1 K/uL    RBC 3.73 (L) 4.23 - 5.6 M/uL    Hemoglobin 11.5 (L) 13.6 - 17.2 g/dL    Hematocrit 35.1 (L) 41.1 - 50.3 %    MCV 94.1 82 - 102 FL    MCH 30.8 26.1 - 32.9 PG    MCHC 32.8 31.4 - 35.0 g/dL    RDW 18.6 (H) 11.9 - 14.6 %    Platelets 235 150 - 450 K/uL    MPV 10.7 9.4 - 12.3 FL    nRBC 0.02 0.0 - 0.2 K/uL    Differential Type AUTOMATED      Neutrophils % 72 43 - 78 %    Lymphocytes % 14 13 - 44 %    Monocytes % 12 4.0 - 12.0 %    Eosinophils % 1 0.5 - 7.8 %    Basophils % 0 0.0 - 2.0 %    Immature Granulocytes % 1 0.0 - 5.0 %    Neutrophils Absolute 5.5 1.7 - 8.2 K/UL    Lymphocytes Absolute 1.0 0.5 - 4.6 K/UL    Monocytes Absolute 0.9 0.1 - 1.3 K/UL    Eosinophils Absolute 0.1 0.0 - 0.8 K/UL    Basophils Absolute 0.0 0.0 - 0.2 K/UL    Immature Granulocytes Absolute 0.1 0.0 - 0.5 K/UL       No results for input(s): \"COVID19\" in the last 72 hours.    Current Meds:  Current Facility-Administered Medications   Medication Dose Route Frequency    pantoprazole (PROTONIX) tablet 40 mg  40 mg Oral BID AC    thiamine (B-1) injection 100 mg  100 mg IntraVENous Daily    amLODIPine (NORVASC) tablet 5 mg  5 mg Per NG tube Daily    atorvastatin (LIPITOR) tablet 80 mg  80 mg Per NG tube Daily    metoprolol tartrate (LOPRESSOR) tablet 100 mg  100 mg Per NG tube BID    senna (SENOKOT) tablet 8.6 mg  1 tablet Per NG tube BID    sodium chloride 0.9 % bolus 250 mL  250 mL IntraVENous Once    diatrizoate meglumine-sodium (GASTROGRAFIN) 66-10 % solution 15 mL  15 mL Oral ONCE PRN    
clinical time.     Julia Fernandez Head, FNP    In this split/shared evaluation I performed reviewed the patients's H&P, available images, labs, cultures., discussed case in detail with NPP, performed a medically appropriate history and exam, counseled and educated the patient and/or family member, ordered and/or reviewed medications, tests or procedures, documented information in EMR, independently interpreted images, and coordinated care. which accounted for 20 minutes clinical time.     Impression: This is a 75 y/o male admitted 10/15 for sepsis, HAM, fall, and dehydration.  He presented to the hospital after a fall at home.  Main complaint was for weakness and abdominal pain.  Sepsis workup was initiated and he was admitted for management. Transferred to ICU 10/16 due to drop in hemoglobin from 11.4 down to 5.4 after large melanotic stool. He received 3 units PRBC with subsequent increase in hemoglobin. Seen by GI EGD and Colonoscopy done on 10/15/24 Moved out of ICU to floor bed 10/17 after improvement in BP and HGB. Restless overnight. Received 2 doses of Zyprexa. No clinical concerns from pulmonology standpoint right now. Will sign off, call as needed        Brant Nick MD     
functional transfers/mobility and functional tasks  Therapeutic Activity (8 Minutes): Therapeutic activity included Rolling, Supine to Sit, Sit to Supine, Scooting, and Sitting balance  to improve functional Activity tolerance, Balance, Mobility, and Strength.    TREATMENT GRID:  N/A    AFTER TREATMENT PRECAUTIONS: Alarm Activated, Bed, Bed/Chair Locked, Call light within reach, Needs within reach, RN notified, and Virtual     INTERDISCIPLINARY COLLABORATION:  RN/ PCT and OT/ RAO    EDUCATION: Education Given To: Patient  Education Provided: Role of Therapy;Plan of Care  Education Method: Verbal  Barriers to Learning: Cognition  Education Outcome: Continued education needed    TIME IN/OUT:  Time In: 0958  Time Out: 1012  Minutes: 14    MIGUEL DEJESUS PT  
(TYLENOL) tablet 650 mg  650 mg Oral Q6H PRN    Or    acetaminophen (TYLENOL) suppository 650 mg  650 mg Rectal Q6H PRN    cefTRIAXone (ROCEPHIN) 1,000 mg in sterile water 10 mL IV syringe  1,000 mg IntraVENous Q24H    senna (SENOKOT) tablet 8.6 mg  1 tablet Oral BID    bisacodyl (DULCOLAX) suppository 10 mg  10 mg Rectal Daily PRN    melatonin tablet 3 mg  3 mg Oral Nightly PRN    diphenhydrAMINE (BENADRYL) injection 25 mg  25 mg IntraVENous Q6H PRN       Signed:    Grant Justice, DO   Internal Medicine      Part of this note may have been written by using a voice dictation software.  The note has been proof read but may still contain some grammatical/other typographical errors.  
UPDATES TO FOLLOW      Culture        Refer to Blood Culture ID Panel Accession [id number]   Culture, Blood 1    Collection Time: 10/15/24  8:31 AM    Specimen: Blood   Result Value Ref Range    Special Requests LEFT  HAND        Culture NO GROWTH 1 DAY     Lactic Acid    Collection Time: 10/15/24  8:31 AM   Result Value Ref Range    Lactic Acid 2.6 (H) 0.5 - 2.0 mmol/L   Urinalysis w/Reflex to Micro    Collection Time: 10/15/24  9:56 AM   Result Value Ref Range    Color, UA YELLOW/STRAW      Appearance CLOUDY      Specific Gravity, UA 1.022 1.001 - 1.023      pH, Urine 5.5 5.0 - 9.0      Protein, UA TRACE (A) NEG mg/dL    Glucose, Ur Negative NEG mg/dL    Ketones, Urine TRACE (A) NEG mg/dL    Bilirubin, Urine Negative NEG      Blood, Urine MODERATE (A) NEG      Urobilinogen, Urine 1.0 0.2 - 1.0 EU/dL    Nitrite, Urine Negative NEG      Leukocyte Esterase, Urine Negative NEG      WBC, UA 0-3 0 /hpf    RBC, UA 0-3 0 /hpf    Epithelial Cells, UA 0-3 0 /hpf    BACTERIA, URINE TRACE 0 /hpf    Casts HYALINE 0 /lpf    Amorphous Crystal 1+ (H) 0    Other observations RESULTS VERIFIED MANUALLY     Culture, Urine    Collection Time: 10/15/24  9:56 AM    Specimen: Urine, clean catch    Urine   Result Value Ref Range    Special Requests NO SPECIAL REQUESTS      Culture        50,000-100,000 COLONIES/mL MIXED SKIN LESIA ISOLATED   Respiratory Panel, Molecular, with COVID-19 (Restricted: peds pts or suitable admitted adults)    Collection Time: 10/15/24  9:56 AM    Specimen: Nasopharyngeal   Result Value Ref Range    Adenovirus by PCR NOT DETECTED NOTDET      Coronavirus 229E by PCR NOT DETECTED NOTDET      Coronavirus HKU1 by PCR NOT DETECTED NOTDET      Coronavirus NL63 by PCR NOT DETECTED NOTDET      Coronavirus OC43 by PCR NOT DETECTED NOTDET      SARS-CoV-2, PCR NOT DETECTED NOTDET      Human Metapneumovirus by PCR NOT DETECTED NOTDET      Rhinovirus Enterovirus PCR NOT DETECTED NOTDET      Influenza A by PCR NOT DETECTED 
injection 4 mg  4 mg IntraVENous Q6H PRN    polyethylene glycol (GLYCOLAX) packet 17 g  17 g Oral Daily PRN    acetaminophen (TYLENOL) tablet 650 mg  650 mg Oral Q6H PRN    Or    acetaminophen (TYLENOL) suppository 650 mg  650 mg Rectal Q6H PRN    cefTRIAXone (ROCEPHIN) 1,000 mg in sterile water 10 mL IV syringe  1,000 mg IntraVENous Q24H    senna (SENOKOT) tablet 8.6 mg  1 tablet Oral BID    bisacodyl (DULCOLAX) suppository 10 mg  10 mg Rectal Daily PRN    melatonin tablet 3 mg  3 mg Oral Nightly PRN       Signed:    Grant Justice, DO   Internal Medicine      Part of this note may have been written by using a voice dictation software.  The note has been proof read but may still contain some grammatical/other typographical errors.  
mg  1 mg SubCUTAneous PRN    dextrose 10 % infusion   IntraVENous Continuous PRN    [Held by provider] clopidogrel (PLAVIX) tablet 75 mg  75 mg Oral Daily    [Held by provider] gabapentin (NEURONTIN) capsule 400 mg  400 mg Oral TID    [Held by provider] lisinopril (PRINIVIL;ZESTRIL) tablet 5 mg  5 mg Oral Daily    [Held by provider] rivaroxaban (XARELTO) tablet 15 mg  15 mg Oral Dinner    sodium chloride flush 0.9 % injection 5-40 mL  5-40 mL IntraVENous PRN    potassium chloride (KLOR-CON M) extended release tablet 40 mEq  40 mEq Oral PRN    Or    potassium bicarb-citric acid (EFFER-K) effervescent tablet 40 mEq  40 mEq Oral PRN    Or    potassium chloride 10 mEq/100 mL IVPB (Peripheral Line)  10 mEq IntraVENous PRN    magnesium sulfate 2000 mg in 50 mL IVPB premix  2,000 mg IntraVENous PRN    ondansetron (ZOFRAN-ODT) disintegrating tablet 4 mg  4 mg Oral Q8H PRN    Or    ondansetron (ZOFRAN) injection 4 mg  4 mg IntraVENous Q6H PRN    polyethylene glycol (GLYCOLAX) packet 17 g  17 g Oral Daily PRN    acetaminophen (TYLENOL) tablet 650 mg  650 mg Oral Q6H PRN    Or    acetaminophen (TYLENOL) suppository 650 mg  650 mg Rectal Q6H PRN    bisacodyl (DULCOLAX) suppository 10 mg  10 mg Rectal Daily PRN    melatonin tablet 3 mg  3 mg Oral Nightly PRN       Signed:  Luis Almonte MD    Part of this note may have been written by using a voice dictation software.  The note has been proof read but may still contain some grammatical/other typographical errors.    
(PLAVIX) tablet 75 mg  75 mg Oral Daily    [Held by provider] gabapentin (NEURONTIN) capsule 400 mg  400 mg Oral TID    [Held by provider] lisinopril (PRINIVIL;ZESTRIL) tablet 5 mg  5 mg Oral Daily    metoprolol tartrate (LOPRESSOR) tablet 100 mg  100 mg Oral BID    rivaroxaban (XARELTO) tablet 15 mg  15 mg Oral Dinner    0.9 % sodium chloride infusion   IntraVENous PRN    sodium chloride flush 0.9 % injection 5-40 mL  5-40 mL IntraVENous PRN    potassium chloride (KLOR-CON M) extended release tablet 40 mEq  40 mEq Oral PRN    Or    potassium bicarb-citric acid (EFFER-K) effervescent tablet 40 mEq  40 mEq Oral PRN    Or    potassium chloride 10 mEq/100 mL IVPB (Peripheral Line)  10 mEq IntraVENous PRN    magnesium sulfate 2000 mg in 50 mL IVPB premix  2,000 mg IntraVENous PRN    ondansetron (ZOFRAN-ODT) disintegrating tablet 4 mg  4 mg Oral Q8H PRN    Or    ondansetron (ZOFRAN) injection 4 mg  4 mg IntraVENous Q6H PRN    polyethylene glycol (GLYCOLAX) packet 17 g  17 g Oral Daily PRN    acetaminophen (TYLENOL) tablet 650 mg  650 mg Oral Q6H PRN    Or    acetaminophen (TYLENOL) suppository 650 mg  650 mg Rectal Q6H PRN    senna (SENOKOT) tablet 8.6 mg  1 tablet Oral BID    bisacodyl (DULCOLAX) suppository 10 mg  10 mg Rectal Daily PRN    melatonin tablet 3 mg  3 mg Oral Nightly PRN       Signed:    Grant Justice,    Internal Medicine      Part of this note may have been written by using a voice dictation software.  The note has been proof read but may still contain some grammatical/other typographical errors.  
(TYLENOL) suppository 650 mg  650 mg Rectal Q6H PRN    bisacodyl (DULCOLAX) suppository 10 mg  10 mg Rectal Daily PRN    melatonin tablet 3 mg  3 mg Oral Nightly PRN       Signed:  Luis Almonte MD    Part of this note may have been written by using a voice dictation software.  The note has been proof read but may still contain some grammatical/other typographical errors.    
BID    senna (SENOKOT) tablet 8.6 mg  1 tablet Per NG tube BID    0.9 % sodium chloride infusion   IntraVENous PRN    pantoprazole (PROTONIX) 40 mg in sodium chloride (PF) 0.9 % 10 mL injection  40 mg IntraVENous Q12H    sodium chloride 0.9 % bolus 250 mL  250 mL IntraVENous Once    diatrizoate meglumine-sodium (GASTROGRAFIN) 66-10 % solution 15 mL  15 mL Oral ONCE PRN    0.9 % sodium chloride infusion   IntraVENous Continuous    cefTRIAXone (ROCEPHIN) 1,000 mg in sterile water 10 mL IV syringe  1,000 mg IntraVENous Q24H    metroNIDAZOLE (FLAGYL) 500 mg in 0.9% NaCl 100 mL IVPB premix  500 mg IntraVENous Q8H    traZODone (DESYREL) tablet 50 mg  50 mg Oral Nightly PRN    [Held by provider] QUEtiapine (SEROQUEL) tablet 25 mg  25 mg Oral Nightly    OLANZapine (ZyPREXA) 5 mg in sterile water 1 mL injection  5 mg IntraMUSCular BID PRN    OLANZapine zydis (ZYPREXA) disintegrating tablet 5 mg  5 mg Oral BID PRN    naloxone 0.4 mg in 10 mL sodium chloride syringe   IntraVENous PRN    sodium chloride flush 0.9 % injection 5-40 mL  5-40 mL IntraVENous 2 times per day    sodium chloride flush 0.9 % injection 5-40 mL  5-40 mL IntraVENous PRN    0.9 % sodium chloride infusion   IntraVENous PRN    glucose chewable tablet 16 g  4 tablet Oral PRN    dextrose bolus 10% 125 mL  125 mL IntraVENous PRN    Or    dextrose bolus 10% 250 mL  250 mL IntraVENous PRN    Glucagon Emergency KIT 1 mg  1 mg SubCUTAneous PRN    dextrose 10 % infusion   IntraVENous Continuous PRN    [Held by provider] clopidogrel (PLAVIX) tablet 75 mg  75 mg Oral Daily    [Held by provider] gabapentin (NEURONTIN) capsule 400 mg  400 mg Oral TID    [Held by provider] lisinopril (PRINIVIL;ZESTRIL) tablet 5 mg  5 mg Oral Daily    [Held by provider] rivaroxaban (XARELTO) tablet 15 mg  15 mg Oral Dinner    0.9 % sodium chloride infusion   IntraVENous PRN    sodium chloride flush 0.9 % injection 5-40 mL  5-40 mL IntraVENous PRN    potassium chloride (KLOR-CON M) extended 
(PRINIVIL;ZESTRIL) tablet 5 mg  5 mg Oral Daily    [Held by provider] rivaroxaban (XARELTO) tablet 15 mg  15 mg Oral Dinner    0.9 % sodium chloride infusion   IntraVENous PRN    sodium chloride flush 0.9 % injection 5-40 mL  5-40 mL IntraVENous PRN    potassium chloride (KLOR-CON M) extended release tablet 40 mEq  40 mEq Oral PRN    Or    potassium bicarb-citric acid (EFFER-K) effervescent tablet 40 mEq  40 mEq Oral PRN    Or    potassium chloride 10 mEq/100 mL IVPB (Peripheral Line)  10 mEq IntraVENous PRN    magnesium sulfate 2000 mg in 50 mL IVPB premix  2,000 mg IntraVENous PRN    ondansetron (ZOFRAN-ODT) disintegrating tablet 4 mg  4 mg Oral Q8H PRN    Or    ondansetron (ZOFRAN) injection 4 mg  4 mg IntraVENous Q6H PRN    polyethylene glycol (GLYCOLAX) packet 17 g  17 g Oral Daily PRN    acetaminophen (TYLENOL) tablet 650 mg  650 mg Oral Q6H PRN    Or    acetaminophen (TYLENOL) suppository 650 mg  650 mg Rectal Q6H PRN    bisacodyl (DULCOLAX) suppository 10 mg  10 mg Rectal Daily PRN    melatonin tablet 3 mg  3 mg Oral Nightly PRN       Signed:    Grant Justice, DO   Internal Medicine      Part of this note may have been written by using a voice dictation software.  The note has been proof read but may still contain some grammatical/other typographical errors.  
400 mg Oral TID    [Held by provider] lisinopril (PRINIVIL;ZESTRIL) tablet 5 mg  5 mg Oral Daily    [Held by provider] metoprolol (LOPRESSOR) tablet 100 mg  100 mg Oral BID    [Held by provider] rivaroxaban (XARELTO) tablet 15 mg  15 mg Oral Dinner    lactated ringers IV soln infusion   IntraVENous Continuous    linezolid (ZYVOX) IVPB 600 mg  600 mg IntraVENous Q12H    0.9 % sodium chloride infusion   IntraVENous PRN    pantoprazole (PROTONIX) 40 mg in sodium chloride (PF) 0.9 % 10 mL injection  40 mg IntraVENous Q12H    VASOpressin (VASOSTRICT) 20 units in dextrose 5% 100 mL infusion  0.01-0.03 Units/min IntraVENous Continuous    dexmedeTOMIDine (PRECEDEX) 400 mcg in sodium chloride 0.9 % 100 mL infusion  0.1-1.5 mcg/kg/hr IntraVENous Continuous    sodium chloride flush 0.9 % injection 5-40 mL  5-40 mL IntraVENous 2 times per day    sodium chloride flush 0.9 % injection 5-40 mL  5-40 mL IntraVENous PRN    potassium chloride (KLOR-CON M) extended release tablet 40 mEq  40 mEq Oral PRN    Or    potassium bicarb-citric acid (EFFER-K) effervescent tablet 40 mEq  40 mEq Oral PRN    Or    potassium chloride 10 mEq/100 mL IVPB (Peripheral Line)  10 mEq IntraVENous PRN    magnesium sulfate 2000 mg in 50 mL IVPB premix  2,000 mg IntraVENous PRN    ondansetron (ZOFRAN-ODT) disintegrating tablet 4 mg  4 mg Oral Q8H PRN    Or    ondansetron (ZOFRAN) injection 4 mg  4 mg IntraVENous Q6H PRN    polyethylene glycol (GLYCOLAX) packet 17 g  17 g Oral Daily PRN    acetaminophen (TYLENOL) tablet 650 mg  650 mg Oral Q6H PRN    Or    acetaminophen (TYLENOL) suppository 650 mg  650 mg Rectal Q6H PRN    cefTRIAXone (ROCEPHIN) 1,000 mg in sterile water 10 mL IV syringe  1,000 mg IntraVENous Q24H    [Held by provider] polyethylene glycol (GLYCOLAX) packet 17 g  17 g Oral Daily    senna (SENOKOT) tablet 8.6 mg  1 tablet Oral BID    bisacodyl (DULCOLAX) suppository 10 mg  10 mg Rectal Daily PRN    melatonin tablet 3 mg  3 mg Oral Nightly PRN

## 2024-11-05 NOTE — PLAN OF CARE
Problem: Chronic Conditions and Co-morbidities  Goal: Patient's chronic conditions and co-morbidity symptoms are monitored and maintained or improved  10/19/2024 0005 by Elvis Boles RN  Outcome: Not Progressing  10/18/2024 1447 by Sam Buchanan RN  Outcome: Progressing     Problem: Discharge Planning  Goal: Discharge to home or other facility with appropriate resources  10/19/2024 0005 by Elvis Boles RN  Outcome: Not Progressing  10/18/2024 1447 by Sam Buchanan RN  Outcome: Progressing     Problem: Chronic Conditions and Co-morbidities  Goal: Patient's chronic conditions and co-morbidity symptoms are monitored and maintained or improved  10/19/2024 0005 by Elvis Boles RN  Outcome: Not Progressing  10/18/2024 1447 by Sam Buchanan RN  Outcome: Progressing     Problem: Discharge Planning  Goal: Discharge to home or other facility with appropriate resources  10/19/2024 0005 by Elvis Boles RN  Outcome: Not Progressing  10/18/2024 1447 by Sam Buchanan RN  Outcome: Progressing     
  Problem: Chronic Conditions and Co-morbidities  Goal: Patient's chronic conditions and co-morbidity symptoms are monitored and maintained or improved  10/22/2024 2248 by Elvis Boles RN  Outcome: Progressing  10/22/2024 1832 by Sandy Kapoor RN  Outcome: Progressing     Problem: Discharge Planning  Goal: Discharge to home or other facility with appropriate resources  10/22/2024 2248 by Elvis Boles RN  Outcome: Progressing  10/22/2024 1832 by Sandy Kapoor RN  Outcome: Progressing     Problem: Skin/Tissue Integrity  Goal: Absence of new skin breakdown  Description: 1.  Monitor for areas of redness and/or skin breakdown  2.  Assess vascular access sites hourly  3.  Every 4-6 hours minimum:  Change oxygen saturation probe site  4.  Every 4-6 hours:  If on nasal continuous positive airway pressure, respiratory therapy assess nares and determine need for appliance change or resting period.  10/22/2024 2248 by Elvis Boles RN  Outcome: Progressing  10/22/2024 1832 by Sandy Kapoor RN  Outcome: Progressing     Problem: ABCDS Injury Assessment  Goal: Absence of physical injury  10/22/2024 2248 by Elvis Boles RN  Outcome: Progressing  10/22/2024 1832 by Sandy Kapoor RN  Outcome: Progressing     Problem: Safety - Adult  Goal: Free from fall injury  10/22/2024 2248 by Elvis Boles RN  Outcome: Progressing  10/22/2024 1832 by Sandy Kapoor RN  Outcome: Progressing     Problem: Pain  Goal: Verbalizes/displays adequate comfort level or baseline comfort level  10/22/2024 2248 by Elvis Boles RN  Outcome: Progressing  10/22/2024 1832 by Sandy Kapoor RN  Outcome: Progressing     
  Problem: Chronic Conditions and Co-morbidities  Goal: Patient's chronic conditions and co-morbidity symptoms are monitored and maintained or improved  10/26/2024 1445 by Karen Pham RN  Outcome: Progressing  10/26/2024 0127 by Ambar Chavez RN  Outcome: Progressing     Problem: Discharge Planning  Goal: Discharge to home or other facility with appropriate resources  10/26/2024 1445 by Karen Pham RN  Outcome: Progressing  10/26/2024 0127 by Ambar Chavez RN  Outcome: Progressing     Problem: Skin/Tissue Integrity  Goal: Absence of new skin breakdown  Description: 1.  Monitor for areas of redness and/or skin breakdown  2.  Assess vascular access sites hourly  3.  Every 4-6 hours minimum:  Change oxygen saturation probe site  4.  Every 4-6 hours:  If on nasal continuous positive airway pressure, respiratory therapy assess nares and determine need for appliance change or resting period.  10/26/2024 1445 by Karen Pham RN  Outcome: Progressing  10/26/2024 0127 by Ambar Chavez RN  Outcome: Progressing     Problem: ABCDS Injury Assessment  Goal: Absence of physical injury  10/26/2024 1445 by Karen Pham RN  Outcome: Progressing  10/26/2024 0127 by Ambar Chavez RN  Outcome: Progressing     Problem: Safety - Adult  Goal: Free from fall injury  10/26/2024 1445 by Karen Pham RN  Outcome: Progressing  10/26/2024 0127 by Ambar Chavez RN  Outcome: Progressing     Problem: Pain  Goal: Verbalizes/displays adequate comfort level or baseline comfort level  10/26/2024 1445 by Karen Pham RN  Outcome: Progressing  10/26/2024 0127 by Ambar Chavez RN  Outcome: Progressing     
  Problem: Chronic Conditions and Co-morbidities  Goal: Patient's chronic conditions and co-morbidity symptoms are monitored and maintained or improved  10/28/2024 0822 by Brigitte Garcia RN  Outcome: Progressing  10/27/2024 2054 by Ambar Chavez RN  Outcome: Progressing  10/27/2024 2053 by Ambar Chavez RN  Outcome: Progressing     Problem: Discharge Planning  Goal: Discharge to home or other facility with appropriate resources  10/28/2024 0822 by Brigitte Garcia RN  Outcome: Progressing  10/27/2024 2054 by Ambar Chavez RN  Outcome: Progressing  10/27/2024 2053 by Ambar Chvaez RN  Outcome: Progressing     Problem: Skin/Tissue Integrity  Goal: Absence of new skin breakdown  Description: 1.  Monitor for areas of redness and/or skin breakdown  2.  Assess vascular access sites hourly  3.  Every 4-6 hours minimum:  Change oxygen saturation probe site  4.  Every 4-6 hours:  If on nasal continuous positive airway pressure, respiratory therapy assess nares and determine need for appliance change or resting period.  10/28/2024 0822 by Brigitte Garcia RN  Outcome: Progressing  10/27/2024 2054 by Ambar Chavez RN  Outcome: Progressing  10/27/2024 2053 by Ambar Chavez RN  Outcome: Progressing     Problem: ABCDS Injury Assessment  Goal: Absence of physical injury  10/28/2024 0822 by Brigitte Gacria RN  Outcome: Progressing  10/27/2024 2054 by Ambar Chavez RN  Outcome: Progressing  10/27/2024 2053 by Ambar Chavez RN  Outcome: Progressing     Problem: Safety - Adult  Goal: Free from fall injury  10/28/2024 0822 by Brigitte Garcia RN  Outcome: Progressing  10/27/2024 2054 by Ambar Chavez RN  Outcome: Progressing  10/27/2024 2053 by Ambar Chavez RN  Outcome: Progressing     Problem: Pain  Goal: Verbalizes/displays adequate comfort level or baseline comfort level  10/28/2024 0822 by Brigitte Garcia RN  Outcome: Progressing  10/27/2024 2054 by Ambar Chavez RN  Outcome: Progressing  10/27/2024 2053 by Ambar Chavez 
  Problem: Chronic Conditions and Co-morbidities  Goal: Patient's chronic conditions and co-morbidity symptoms are monitored and maintained or improved  10/29/2024 0718 by Brigitte Garcia RN  Outcome: Progressing  10/29/2024 0045 by Michael Lima RN  Outcome: Progressing     Problem: Discharge Planning  Goal: Discharge to home or other facility with appropriate resources  10/29/2024 0718 by Brigitte Garcia RN  Outcome: Progressing  10/29/2024 0045 by Michael Lima RN  Outcome: Progressing     Problem: Skin/Tissue Integrity  Goal: Absence of new skin breakdown  Description: 1.  Monitor for areas of redness and/or skin breakdown  2.  Assess vascular access sites hourly  3.  Every 4-6 hours minimum:  Change oxygen saturation probe site  4.  Every 4-6 hours:  If on nasal continuous positive airway pressure, respiratory therapy assess nares and determine need for appliance change or resting period.  10/29/2024 0718 by Brigitte Garcia RN  Outcome: Progressing  10/29/2024 0045 by Michael Lima RN  Outcome: Progressing     Problem: ABCDS Injury Assessment  Goal: Absence of physical injury  10/29/2024 0718 by Brigitte Garcia RN  Outcome: Progressing  10/29/2024 0045 by Michael Lima RN  Outcome: Progressing     Problem: Safety - Adult  Goal: Free from fall injury  10/29/2024 0718 by Brigitte Garcia RN  Outcome: Progressing  10/29/2024 0045 by Michael Lima RN  Outcome: Progressing     Problem: Pain  Goal: Verbalizes/displays adequate comfort level or baseline comfort level  10/29/2024 0718 by Brigitte Garcia RN  Outcome: Progressing  10/29/2024 0045 by Michael iLma RN  Outcome: Progressing     Problem: Safety - Medical Restraint  Goal: Remains free of injury from restraints (Restraint for Interference with Medical Device)  Description: INTERVENTIONS:  1. Determine that other, less restrictive measures have been tried or would not be effective before applying the restraint  2. Evaluate the patient's condition at 
  Problem: Chronic Conditions and Co-morbidities  Goal: Patient's chronic conditions and co-morbidity symptoms are monitored and maintained or improved  11/1/2024 0848 by Karen Pham RN  Outcome: Progressing  11/1/2024 0029 by Michael Lima RN  Outcome: Progressing     Problem: Discharge Planning  Goal: Discharge to home or other facility with appropriate resources  11/1/2024 0848 by Karen Pham RN  Outcome: Progressing  11/1/2024 0029 by Michael Lima RN  Outcome: Progressing     Problem: Skin/Tissue Integrity  Goal: Absence of new skin breakdown  Description: 1.  Monitor for areas of redness and/or skin breakdown  2.  Assess vascular access sites hourly  3.  Every 4-6 hours minimum:  Change oxygen saturation probe site  4.  Every 4-6 hours:  If on nasal continuous positive airway pressure, respiratory therapy assess nares and determine need for appliance change or resting period.  11/1/2024 0848 by Karen Pham RN  Outcome: Progressing  11/1/2024 0029 by Michael Lima RN  Outcome: Progressing     Problem: ABCDS Injury Assessment  Goal: Absence of physical injury  11/1/2024 0848 by Karen Pham RN  Outcome: Progressing  11/1/2024 0029 by Michael Lima RN  Outcome: Progressing     Problem: Safety - Adult  Goal: Free from fall injury  11/1/2024 0848 by Karen Pham RN  Outcome: Progressing  11/1/2024 0029 by Michael Lima RN  Outcome: Progressing     Problem: Pain  Goal: Verbalizes/displays adequate comfort level or baseline comfort level  11/1/2024 0848 by Karen Pham RN  Outcome: Progressing  11/1/2024 0029 by Michael Lima RN  Outcome: Progressing     Problem: Safety - Medical Restraint  Goal: Remains free of injury from restraints (Restraint for Interference with Medical Device)  Description: INTERVENTIONS:  1. Determine that other, less restrictive measures have been tried or would not be effective before applying the restraint  2. Evaluate the patient's condition at 
  Problem: Chronic Conditions and Co-morbidities  Goal: Patient's chronic conditions and co-morbidity symptoms are monitored and maintained or improved  11/5/2024 1109 by Elizabeth Sarabia  Outcome: Adequate for Discharge  11/4/2024 2205 by Chrissy Dunn, RN  Outcome: Progressing  Flowsheets (Taken 11/4/2024 1925)  Care Plan - Patient's Chronic Conditions and Co-Morbidity Symptoms are Monitored and Maintained or Improved: Monitor and assess patient's chronic conditions and comorbid symptoms for stability, deterioration, or improvement     Problem: Discharge Planning  Goal: Discharge to home or other facility with appropriate resources  11/5/2024 1109 by Elizabeth Sarabia  Outcome: Adequate for Discharge  11/4/2024 2205 by Chrissy Dunn, RN  Outcome: Progressing  Flowsheets (Taken 11/4/2024 1925)  Discharge to home or other facility with appropriate resources:   Identify barriers to discharge with patient and caregiver   Identify discharge learning needs (meds, wound care, etc)   Refer to discharge planning if patient needs post-hospital services based on physician order or complex needs related to functional status, cognitive ability or social support system     Problem: Skin/Tissue Integrity  Goal: Absence of new skin breakdown  Description: 1.  Monitor for areas of redness and/or skin breakdown  2.  Assess vascular access sites hourly  3.  Every 4-6 hours minimum:  Change oxygen saturation probe site  4.  Every 4-6 hours:  If on nasal continuous positive airway pressure, respiratory therapy assess nares and determine need for appliance change or resting period.  11/5/2024 1109 by Elizabeth Sarabia  Outcome: Adequate for Discharge  11/4/2024 2205 by Chrissy Dunn, RN  Outcome: Progressing     Problem: ABCDS Injury Assessment  Goal: Absence of physical injury  11/5/2024 1109 by Elizabeth Sarabia  Outcome: Adequate for Discharge  11/4/2024 2205 by Chrissy Dunn, RN  Outcome: 
  Problem: Chronic Conditions and Co-morbidities  Goal: Patient's chronic conditions and co-morbidity symptoms are monitored and maintained or improved  Outcome: Progressing     Problem: Discharge Planning  Goal: Discharge to home or other facility with appropriate resources  Outcome: Progressing     Problem: Skin/Tissue Integrity  Goal: Absence of new skin breakdown  Description: 1.  Monitor for areas of redness and/or skin breakdown  2.  Assess vascular access sites hourly  3.  Every 4-6 hours minimum:  Change oxygen saturation probe site  4.  Every 4-6 hours:  If on nasal continuous positive airway pressure, respiratory therapy assess nares and determine need for appliance change or resting period.  Outcome: Progressing     Problem: ABCDS Injury Assessment  Goal: Absence of physical injury  Outcome: Progressing     Problem: Safety - Adult  Goal: Free from fall injury  Outcome: Progressing     Problem: Pain  Goal: Verbalizes/displays adequate comfort level or baseline comfort level  Outcome: Progressing     
  Problem: Chronic Conditions and Co-morbidities  Goal: Patient's chronic conditions and co-morbidity symptoms are monitored and maintained or improved  Outcome: Progressing     Problem: Discharge Planning  Goal: Discharge to home or other facility with appropriate resources  Outcome: Progressing     Problem: Skin/Tissue Integrity  Goal: Absence of new skin breakdown  Description: 1.  Monitor for areas of redness and/or skin breakdown  2.  Assess vascular access sites hourly  3.  Every 4-6 hours minimum:  Change oxygen saturation probe site  4.  Every 4-6 hours:  If on nasal continuous positive airway pressure, respiratory therapy assess nares and determine need for appliance change or resting period.  Outcome: Progressing     Problem: ABCDS Injury Assessment  Goal: Absence of physical injury  Outcome: Progressing     Problem: Safety - Adult  Goal: Free from fall injury  Outcome: Progressing     Problem: Pain  Goal: Verbalizes/displays adequate comfort level or baseline comfort level  Outcome: Progressing     Problem: Safety - Medical Restraint  Goal: Remains free of injury from restraints (Restraint for Interference with Medical Device)  Description: INTERVENTIONS:  1. Determine that other, less restrictive measures have been tried or would not be effective before applying the restraint  2. Evaluate the patient's condition at the time of restraint application  3. Inform patient/family regarding the reason for restraint  4. Q2H: Monitor safety, psychosocial status, comfort, nutrition and hydration  Outcome: Progressing     
  Problem: Chronic Conditions and Co-morbidities  Goal: Patient's chronic conditions and co-morbidity symptoms are monitored and maintained or improved  Outcome: Progressing     Problem: Discharge Planning  Goal: Discharge to home or other facility with appropriate resources  Outcome: Progressing     Problem: Skin/Tissue Integrity  Goal: Absence of new skin breakdown  Description: 1.  Monitor for areas of redness and/or skin breakdown  2.  Assess vascular access sites hourly  3.  Every 4-6 hours minimum:  Change oxygen saturation probe site  4.  Every 4-6 hours:  If on nasal continuous positive airway pressure, respiratory therapy assess nares and determine need for appliance change or resting period.  Outcome: Progressing     Problem: ABCDS Injury Assessment  Goal: Absence of physical injury  Outcome: Progressing     Problem: Safety - Adult  Goal: Free from fall injury  Outcome: Progressing     Problem: Pain  Goal: Verbalizes/displays adequate comfort level or baseline comfort level  Outcome: Progressing     Problem: Safety - Medical Restraint  Goal: Remains free of injury from restraints (Restraint for Interference with Medical Device)  Description: INTERVENTIONS:  1. Determine that other, less restrictive measures have been tried or would not be effective before applying the restraint  2. Evaluate the patient's condition at the time of restraint application  3. Inform patient/family regarding the reason for restraint  4. Q2H: Monitor safety, psychosocial status, comfort, nutrition and hydration  Outcome: Progressing     
  Problem: Chronic Conditions and Co-morbidities  Goal: Patient's chronic conditions and co-morbidity symptoms are monitored and maintained or improved  Outcome: Progressing     Problem: Discharge Planning  Goal: Discharge to home or other facility with appropriate resources  Outcome: Progressing     Problem: Skin/Tissue Integrity  Goal: Absence of new skin breakdown  Outcome: Progressing     Problem: ABCDS Injury Assessment  Goal: Absence of physical injury  Outcome: Progressing     Problem: Safety - Adult  Goal: Free from fall injury  Outcome: Progressing     Problem: Pain  Goal: Verbalizes/displays adequate comfort level or baseline comfort level  Outcome: Progressing     
  Problem: Discharge Planning  Goal: Discharge to home or other facility with appropriate resources  Outcome: Progressing     Problem: Safety - Adult  Goal: Free from fall injury  Outcome: Progressing     Problem: Pain  Goal: Verbalizes/displays adequate comfort level or baseline comfort level  Outcome: Progressing     
  Problem: Discharge Planning  Goal: Discharge to home or other facility with appropriate resources  Outcome: Progressing  Flowsheets (Taken 11/4/2024 1925)  Discharge to home or other facility with appropriate resources:   Identify barriers to discharge with patient and caregiver   Identify discharge learning needs (meds, wound care, etc)   Refer to discharge planning if patient needs post-hospital services based on physician order or complex needs related to functional status, cognitive ability or social support system     Problem: Skin/Tissue Integrity  Goal: Absence of new skin breakdown  Description: 1.  Monitor for areas of redness and/or skin breakdown  2.  Assess vascular access sites hourly  3.  Every 4-6 hours minimum:  Change oxygen saturation probe site  4.  Every 4-6 hours:  If on nasal continuous positive airway pressure, respiratory therapy assess nares and determine need for appliance change or resting period.  Outcome: Progressing     
  Problem: Skin/Tissue Integrity  Goal: Absence of new skin breakdown  10/17/2024 0006 by Carli Goncalves, RN  Outcome: Progressing  10/16/2024 1510 by Melissa Snyder RN  Outcome: Progressing     Problem: Safety - Adult  Goal: Free from fall injury  10/17/2024 0006 by Carli Goncalves, RN  Outcome: Progressing  10/16/2024 1510 by Melissa Snyder, RN  Outcome: Progressing     
psychosocial status, comfort, nutrition and hydration  Taken 10/27/2024 0200 by Ambar Chavez, RN  Remains free of injury from restraints (restraint for interference with medical device): Every 2 hours: Monitor safety, psychosocial status, comfort, nutrition and hydration

## 2024-11-05 NOTE — DISCHARGE SUMMARY
11.9 - 14.6 %    Platelets 295 150 - 450 K/uL    MPV 10.2 9.4 - 12.3 FL    nRBC 0.00 0.0 - 0.2 K/uL    Differential Type AUTOMATED      Neutrophils % 73 43 - 78 %    Lymphocytes % 16 13 - 44 %    Monocytes % 10 4.0 - 12.0 %    Eosinophils % 1 0.5 - 7.8 %    Basophils % 0 0.0 - 2.0 %    Immature Granulocytes % 0 0.0 - 5.0 %    Neutrophils Absolute 6.8 1.7 - 8.2 K/UL    Lymphocytes Absolute 1.5 0.5 - 4.6 K/UL    Monocytes Absolute 0.9 0.1 - 1.3 K/UL    Eosinophils Absolute 0.1 0.0 - 0.8 K/UL    Basophils Absolute 0.0 0.0 - 0.2 K/UL    Immature Granulocytes Absolute 0.0 0.0 - 0.5 K/UL   Basic Metabolic Panel w/ Reflex to MG    Collection Time: 11/05/24  5:26 AM   Result Value Ref Range    Sodium 137 136 - 145 mmol/L    Potassium 4.3 3.5 - 5.1 mmol/L    Chloride 106 98 - 107 mmol/L    CO2 24 20 - 29 mmol/L    Anion Gap 8 7 - 16 mmol/L    Glucose 109 (H) 70 - 99 mg/dL    BUN 10 8 - 23 MG/DL    Creatinine 0.91 0.80 - 1.30 MG/DL    Est, Glom Filt Rate 89 >60 ml/min/1.73m2    Calcium 8.3 (L) 8.8 - 10.2 MG/DL       No results for input(s): \"COVID19\" in the last 72 hours.    Recent Vital Data:  Patient Vitals for the past 24 hrs:   Temp Pulse Resp BP SpO2   11/05/24 0751 97.7 °F (36.5 °C) 61 16 118/76 98 %   11/05/24 0445 -- -- -- 117/77 --   11/05/24 0439 97.5 °F (36.4 °C) 52 16 (!) 116/49 96 %   11/04/24 2042 -- 66 -- (!) 139/59 --   11/04/24 1956 98.2 °F (36.8 °C) 66 19 (!) 139/59 100 %   11/04/24 1632 98.1 °F (36.7 °C) 73 16 115/81 94 %       Oxygen Therapy  SpO2: 98 %  Pulse via Oximetry: 74 beats per minute  Pulse Oximeter Device Mode: Continuous  Pulse Oximeter Device Location: Finger  O2 Device: None (Room air)  O2 Flow Rate (L/min): 3 L/min  Blood Gas  Performed?: Yes  Jd's Test #1: Collateral flow confirmed  Site #1: Right Radial  Site Prepped #1: Yes  Number of Attempts #1: 1  Pressure Held #1: Yes  Complications #1: None  Post-procedure #1: Standard  Specimen Status #1: Point of care  How Tolerated?: Tolerated

## 2024-11-05 NOTE — CARE COORDINATION
Transport arranged for 1200. CM spoke with daughter in room, IMM signed.   ROOM 425  REPORT [phone]    Albina HOUSER, ACM  [place]

## 2024-11-05 NOTE — CARE COORDINATION
Patient is discharging to short term rehab today. Transportation arranged for 1230. CM called daughter, Mary and updated her on plan. Mary reports she will be at the hospital shortly. Packet arranged and placed in chart at nursing station.     Albina HOUSER, ACM  [place]

## 2024-11-09 ENCOUNTER — APPOINTMENT (OUTPATIENT)
Dept: GENERAL RADIOLOGY | Age: 74
DRG: 871 | End: 2024-11-09
Payer: MEDICARE

## 2024-11-09 ENCOUNTER — APPOINTMENT (OUTPATIENT)
Dept: CT IMAGING | Age: 74
DRG: 871 | End: 2024-11-09
Payer: MEDICARE

## 2024-11-09 ENCOUNTER — HOSPITAL ENCOUNTER (INPATIENT)
Age: 74
LOS: 4 days | Discharge: HOSPICE | DRG: 871 | End: 2024-11-13
Attending: EMERGENCY MEDICINE | Admitting: FAMILY MEDICINE
Payer: MEDICARE

## 2024-11-09 DIAGNOSIS — J18.9 PNEUMONIA OF BOTH LOWER LOBES DUE TO INFECTIOUS ORGANISM: ICD-10-CM

## 2024-11-09 DIAGNOSIS — K56.609 SBO (SMALL BOWEL OBSTRUCTION) (HCC): Primary | ICD-10-CM

## 2024-11-09 DIAGNOSIS — K92.0 HEMATEMESIS, UNSPECIFIED WHETHER NAUSEA PRESENT: ICD-10-CM

## 2024-11-09 DIAGNOSIS — R06.03 RESPIRATORY DISTRESS: ICD-10-CM

## 2024-11-09 DIAGNOSIS — R06.02 SHORTNESS OF BREATH: ICD-10-CM

## 2024-11-09 DIAGNOSIS — N17.9 AKI (ACUTE KIDNEY INJURY) (HCC): ICD-10-CM

## 2024-11-09 DIAGNOSIS — E87.20 LACTIC ACID ACIDOSIS: ICD-10-CM

## 2024-11-09 PROBLEM — Z86.73 H/O: CVA (CEREBROVASCULAR ACCIDENT): Status: ACTIVE | Noted: 2024-11-09

## 2024-11-09 PROBLEM — L89.159 PRESSURE INJURY OF SKIN OF SACRAL REGION: Status: ACTIVE | Noted: 2024-11-09

## 2024-11-09 LAB
ABO + RH BLD: NORMAL
ALBUMIN SERPL-MCNC: 2.7 G/DL (ref 3.2–4.6)
ALBUMIN/GLOB SERPL: 0.8 (ref 1–1.9)
ALP SERPL-CCNC: 85 U/L (ref 40–129)
ALT SERPL-CCNC: 29 U/L (ref 8–55)
AMORPH CRY URNS QL MICRO: ABNORMAL
ANION GAP BLD CALC-SCNC: ABNORMAL MMOL/L
ANION GAP SERPL CALC-SCNC: 18 MMOL/L (ref 7–16)
ANION GAP SERPL CALC-SCNC: 24 MMOL/L (ref 7–16)
APPEARANCE UR: ABNORMAL
ARTERIAL PATENCY WRIST A: POSITIVE
AST SERPL-CCNC: 37 U/L (ref 15–37)
BACTERIA URNS QL MICRO: ABNORMAL /HPF
BASE DEFICIT BLD-SCNC: 7.3 MMOL/L
BASOPHILS # BLD: 0 K/UL (ref 0–0.2)
BASOPHILS # BLD: 0.1 K/UL (ref 0–0.2)
BASOPHILS NFR BLD: 0 % (ref 0–2)
BASOPHILS NFR BLD: 1 % (ref 0–2)
BDY SITE: ABNORMAL
BILIRUB SERPL-MCNC: 1.1 MG/DL (ref 0–1.2)
BILIRUB UR QL: ABNORMAL
BLOOD GROUP ANTIBODIES SERPL: NORMAL
BUN SERPL-MCNC: 26 MG/DL (ref 8–23)
BUN SERPL-MCNC: 28 MG/DL (ref 8–23)
CA-I BLD-MCNC: 1.2 MMOL/L (ref 1.12–1.32)
CALCIUM SERPL-MCNC: 8.4 MG/DL (ref 8.8–10.2)
CALCIUM SERPL-MCNC: 9.2 MG/DL (ref 8.8–10.2)
CASTS URNS QL MICRO: ABNORMAL /LPF
CHLORIDE SERPL-SCNC: 103 MMOL/L (ref 98–107)
CHLORIDE SERPL-SCNC: 107 MMOL/L (ref 98–107)
CO2 BLD-SCNC: 14 MMOL/L (ref 13–23)
CO2 SERPL-SCNC: 17 MMOL/L (ref 20–29)
CO2 SERPL-SCNC: 17 MMOL/L (ref 20–29)
COLOR UR: ABNORMAL
CREAT SERPL-MCNC: 1.81 MG/DL (ref 0.8–1.3)
CREAT SERPL-MCNC: 1.97 MG/DL (ref 0.8–1.3)
DIFFERENTIAL METHOD BLD: ABNORMAL
DIFFERENTIAL METHOD BLD: ABNORMAL
EKG ATRIAL RATE: 102 BPM
EKG DIAGNOSIS: NORMAL
EKG P AXIS: 56 DEGREES
EKG P-R INTERVAL: 135 MS
EKG Q-T INTERVAL: 355 MS
EKG QRS DURATION: 122 MS
EKG QTC CALCULATION (BAZETT): 461 MS
EKG R AXIS: -22 DEGREES
EKG T AXIS: 17 DEGREES
EKG VENTRICULAR RATE: 101 BPM
EOSINOPHIL # BLD: 0 K/UL (ref 0–0.8)
EOSINOPHIL # BLD: 0 K/UL (ref 0–0.8)
EOSINOPHIL NFR BLD: 0 % (ref 0.5–7.8)
EOSINOPHIL NFR BLD: 0 % (ref 0.5–7.8)
EPI CELLS #/AREA URNS HPF: ABNORMAL /HPF
ERYTHROCYTE [DISTWIDTH] IN BLOOD BY AUTOMATED COUNT: 18.8 % (ref 11.9–14.6)
ERYTHROCYTE [DISTWIDTH] IN BLOOD BY AUTOMATED COUNT: 18.9 % (ref 11.9–14.6)
ETHANOL SERPL-MCNC: <11 MG/DL (ref 0–0.08)
GAS FLOW.O2 O2 DELIVERY SYS: ABNORMAL
GLOBULIN SER CALC-MCNC: 3.5 G/DL (ref 2.3–3.5)
GLUCOSE BLD STRIP.AUTO-MCNC: 190 MG/DL (ref 65–100)
GLUCOSE SERPL-MCNC: 127 MG/DL (ref 70–99)
GLUCOSE SERPL-MCNC: 175 MG/DL (ref 70–99)
GLUCOSE UR STRIP.AUTO-MCNC: NEGATIVE MG/DL
HCO3 BLD-SCNC: 14.9 MMOL/L (ref 22–26)
HCT VFR BLD AUTO: 41.6 % (ref 41.1–50.3)
HCT VFR BLD AUTO: 42.4 % (ref 41.1–50.3)
HGB BLD-MCNC: 12.4 G/DL (ref 13.6–17.2)
HGB BLD-MCNC: 12.9 G/DL (ref 13.6–17.2)
HGB UR QL STRIP: NEGATIVE
IMM GRANULOCYTES # BLD AUTO: 0.1 K/UL (ref 0–0.5)
IMM GRANULOCYTES # BLD AUTO: 0.1 K/UL (ref 0–0.5)
IMM GRANULOCYTES NFR BLD AUTO: 1 % (ref 0–5)
IMM GRANULOCYTES NFR BLD AUTO: 1 % (ref 0–5)
INR PPP: 1.3
KETONES UR QL STRIP.AUTO: 15 MG/DL
LACTATE SERPL-SCNC: 11.2 MMOL/L (ref 0.5–2)
LACTATE SERPL-SCNC: 6.4 MMOL/L (ref 0.5–2)
LACTATE SERPL-SCNC: 7.5 MMOL/L (ref 0.5–2)
LACTATE SERPL-SCNC: 8.4 MMOL/L (ref 0.5–2)
LEUKOCYTE ESTERASE UR QL STRIP.AUTO: NEGATIVE
LIPASE SERPL-CCNC: 13 U/L (ref 13–60)
LYMPHOCYTES # BLD: 0.6 K/UL (ref 0.5–4.6)
LYMPHOCYTES # BLD: 0.9 K/UL (ref 0.5–4.6)
LYMPHOCYTES NFR BLD: 12 % (ref 13–44)
LYMPHOCYTES NFR BLD: 9 % (ref 13–44)
MCH RBC QN AUTO: 30.6 PG (ref 26.1–32.9)
MCH RBC QN AUTO: 30.7 PG (ref 26.1–32.9)
MCHC RBC AUTO-ENTMCNC: 29.8 G/DL (ref 31.4–35)
MCHC RBC AUTO-ENTMCNC: 30.4 G/DL (ref 31.4–35)
MCV RBC AUTO: 100.5 FL (ref 82–102)
MCV RBC AUTO: 103 FL (ref 82–102)
MONOCYTES # BLD: 0.4 K/UL (ref 0.1–1.3)
MONOCYTES # BLD: 0.5 K/UL (ref 0.1–1.3)
MONOCYTES NFR BLD: 5 % (ref 4–12)
MONOCYTES NFR BLD: 7 % (ref 4–12)
MUCOUS THREADS URNS QL MICRO: ABNORMAL /LPF
NEUTS SEG # BLD: 5.2 K/UL (ref 1.7–8.2)
NEUTS SEG # BLD: 6.7 K/UL (ref 1.7–8.2)
NEUTS SEG NFR BLD: 82 % (ref 43–78)
NEUTS SEG NFR BLD: 82 % (ref 43–78)
NITRITE UR QL STRIP.AUTO: NEGATIVE
NRBC # BLD: 0 K/UL (ref 0–0.2)
NRBC # BLD: 0 K/UL (ref 0–0.2)
PCO2 BLD: 22.4 MMHG (ref 35–45)
PH BLD: 7.43 (ref 7.35–7.45)
PH UR STRIP: 5.5 (ref 5–9)
PLATELET # BLD AUTO: 273 K/UL (ref 150–450)
PLATELET # BLD AUTO: 336 K/UL (ref 150–450)
PLATELET COMMENT: ADEQUATE
PMV BLD AUTO: 9.7 FL (ref 9.4–12.3)
PMV BLD AUTO: 9.8 FL (ref 9.4–12.3)
PO2 BLD: 50 MMHG (ref 75–100)
POC FIO2: 6
POTASSIUM BLD-SCNC: 4.5 MMOL/L (ref 3.5–5.1)
POTASSIUM SERPL-SCNC: 4.3 MMOL/L (ref 3.5–5.1)
POTASSIUM SERPL-SCNC: 4.8 MMOL/L (ref 3.5–5.1)
PROCALCITONIN SERPL-MCNC: 0.8 NG/ML (ref 0–0.1)
PROT SERPL-MCNC: 6.1 G/DL (ref 6.3–8.2)
PROT UR STRIP-MCNC: 30 MG/DL
PROTHROMBIN TIME: 16.6 SEC (ref 11.3–14.9)
RBC # BLD AUTO: 4.04 M/UL (ref 4.23–5.6)
RBC # BLD AUTO: 4.22 M/UL (ref 4.23–5.6)
RBC #/AREA URNS HPF: ABNORMAL /HPF
RBC MORPH BLD: ABNORMAL
SAO2 % BLD: 87 %
SERVICE CMNT-IMP: ABNORMAL
SODIUM BLD-SCNC: 141 MMOL/L (ref 136–145)
SODIUM SERPL-SCNC: 142 MMOL/L (ref 136–145)
SODIUM SERPL-SCNC: 144 MMOL/L (ref 136–145)
SP GR UR REFRACTOMETRY: 1.03 (ref 1–1.02)
SPECIMEN EXP DATE BLD: NORMAL
SPECIMEN SITE: ABNORMAL
UROBILINOGEN UR QL STRIP.AUTO: 1 EU/DL (ref 0.2–1)
WBC # BLD AUTO: 6.5 K/UL (ref 4.3–11.1)
WBC # BLD AUTO: 8.1 K/UL (ref 4.3–11.1)
WBC MORPH BLD: ABNORMAL
WBC URNS QL MICRO: ABNORMAL /HPF

## 2024-11-09 PROCEDURE — 82077 ASSAY SPEC XCP UR&BREATH IA: CPT

## 2024-11-09 PROCEDURE — 84132 ASSAY OF SERUM POTASSIUM: CPT

## 2024-11-09 PROCEDURE — 84295 ASSAY OF SERUM SODIUM: CPT

## 2024-11-09 PROCEDURE — 96367 TX/PROPH/DG ADDL SEQ IV INF: CPT

## 2024-11-09 PROCEDURE — 2100000000 HC CCU R&B

## 2024-11-09 PROCEDURE — 81001 URINALYSIS AUTO W/SCOPE: CPT

## 2024-11-09 PROCEDURE — 94640 AIRWAY INHALATION TREATMENT: CPT

## 2024-11-09 PROCEDURE — 82330 ASSAY OF CALCIUM: CPT

## 2024-11-09 PROCEDURE — 83690 ASSAY OF LIPASE: CPT

## 2024-11-09 PROCEDURE — 36415 COLL VENOUS BLD VENIPUNCTURE: CPT

## 2024-11-09 PROCEDURE — 71260 CT THORAX DX C+: CPT

## 2024-11-09 PROCEDURE — 86901 BLOOD TYPING SEROLOGIC RH(D): CPT

## 2024-11-09 PROCEDURE — 85610 PROTHROMBIN TIME: CPT

## 2024-11-09 PROCEDURE — 80053 COMPREHEN METABOLIC PANEL: CPT

## 2024-11-09 PROCEDURE — 93005 ELECTROCARDIOGRAM TRACING: CPT | Performed by: EMERGENCY MEDICINE

## 2024-11-09 PROCEDURE — 84145 PROCALCITONIN (PCT): CPT

## 2024-11-09 PROCEDURE — 82803 BLOOD GASES ANY COMBINATION: CPT

## 2024-11-09 PROCEDURE — 6370000000 HC RX 637 (ALT 250 FOR IP): Performed by: EMERGENCY MEDICINE

## 2024-11-09 PROCEDURE — 99285 EMERGENCY DEPT VISIT HI MDM: CPT

## 2024-11-09 PROCEDURE — 6360000004 HC RX CONTRAST MEDICATION: Performed by: EMERGENCY MEDICINE

## 2024-11-09 PROCEDURE — 96361 HYDRATE IV INFUSION ADD-ON: CPT

## 2024-11-09 PROCEDURE — 83605 ASSAY OF LACTIC ACID: CPT

## 2024-11-09 PROCEDURE — 96375 TX/PRO/DX INJ NEW DRUG ADDON: CPT

## 2024-11-09 PROCEDURE — 74018 RADEX ABDOMEN 1 VIEW: CPT

## 2024-11-09 PROCEDURE — 2700000000 HC OXYGEN THERAPY PER DAY

## 2024-11-09 PROCEDURE — 96365 THER/PROPH/DIAG IV INF INIT: CPT

## 2024-11-09 PROCEDURE — 2580000003 HC RX 258: Performed by: FAMILY MEDICINE

## 2024-11-09 PROCEDURE — 99222 1ST HOSP IP/OBS MODERATE 55: CPT | Performed by: SURGERY

## 2024-11-09 PROCEDURE — 82947 ASSAY GLUCOSE BLOOD QUANT: CPT

## 2024-11-09 PROCEDURE — 2580000003 HC RX 258: Performed by: EMERGENCY MEDICINE

## 2024-11-09 PROCEDURE — 86900 BLOOD TYPING SEROLOGIC ABO: CPT

## 2024-11-09 PROCEDURE — 85025 COMPLETE CBC W/AUTO DIFF WBC: CPT

## 2024-11-09 PROCEDURE — 6360000002 HC RX W HCPCS: Performed by: EMERGENCY MEDICINE

## 2024-11-09 PROCEDURE — 71045 X-RAY EXAM CHEST 1 VIEW: CPT

## 2024-11-09 PROCEDURE — 6360000002 HC RX W HCPCS: Performed by: FAMILY MEDICINE

## 2024-11-09 PROCEDURE — 86850 RBC ANTIBODY SCREEN: CPT

## 2024-11-09 PROCEDURE — 87040 BLOOD CULTURE FOR BACTERIA: CPT

## 2024-11-09 PROCEDURE — 93010 ELECTROCARDIOGRAM REPORT: CPT | Performed by: INTERNAL MEDICINE

## 2024-11-09 RX ORDER — IPRATROPIUM BROMIDE AND ALBUTEROL SULFATE 2.5; .5 MG/3ML; MG/3ML
1 SOLUTION RESPIRATORY (INHALATION)
Status: COMPLETED | OUTPATIENT
Start: 2024-11-09 | End: 2024-11-09

## 2024-11-09 RX ORDER — 0.9 % SODIUM CHLORIDE 0.9 %
1000 INTRAVENOUS SOLUTION INTRAVENOUS ONCE
Status: COMPLETED | OUTPATIENT
Start: 2024-11-09 | End: 2024-11-09

## 2024-11-09 RX ORDER — SODIUM CHLORIDE 0.9 % (FLUSH) 0.9 %
5-40 SYRINGE (ML) INJECTION PRN
Status: DISCONTINUED | OUTPATIENT
Start: 2024-11-09 | End: 2024-11-13 | Stop reason: HOSPADM

## 2024-11-09 RX ORDER — SODIUM CHLORIDE 9 MG/ML
INJECTION, SOLUTION INTRAVENOUS CONTINUOUS
Status: DISCONTINUED | OUTPATIENT
Start: 2024-11-09 | End: 2024-11-10

## 2024-11-09 RX ORDER — 0.9 % SODIUM CHLORIDE 0.9 %
250 INTRAVENOUS SOLUTION INTRAVENOUS ONCE
Status: COMPLETED | OUTPATIENT
Start: 2024-11-09 | End: 2024-11-09

## 2024-11-09 RX ORDER — ONDANSETRON 2 MG/ML
4 INJECTION INTRAMUSCULAR; INTRAVENOUS EVERY 6 HOURS PRN
Status: DISCONTINUED | OUTPATIENT
Start: 2024-11-09 | End: 2024-11-13 | Stop reason: HOSPADM

## 2024-11-09 RX ORDER — POLYETHYLENE GLYCOL 3350 17 G/17G
17 POWDER, FOR SOLUTION ORAL DAILY PRN
Status: DISCONTINUED | OUTPATIENT
Start: 2024-11-09 | End: 2024-11-11

## 2024-11-09 RX ORDER — 0.9 % SODIUM CHLORIDE 0.9 %
1000 INTRAVENOUS SOLUTION INTRAVENOUS ONCE
Status: DISCONTINUED | OUTPATIENT
Start: 2024-11-09 | End: 2024-11-11

## 2024-11-09 RX ORDER — ACETAMINOPHEN 325 MG/1
650 TABLET ORAL EVERY 6 HOURS PRN
Status: DISCONTINUED | OUTPATIENT
Start: 2024-11-09 | End: 2024-11-11

## 2024-11-09 RX ORDER — SODIUM CHLORIDE 9 MG/ML
INJECTION, SOLUTION INTRAVENOUS PRN
Status: DISCONTINUED | OUTPATIENT
Start: 2024-11-09 | End: 2024-11-13 | Stop reason: HOSPADM

## 2024-11-09 RX ORDER — METRONIDAZOLE 500 MG/100ML
500 INJECTION, SOLUTION INTRAVENOUS EVERY 8 HOURS
Status: DISCONTINUED | OUTPATIENT
Start: 2024-11-09 | End: 2024-11-11

## 2024-11-09 RX ORDER — LINEZOLID 2 MG/ML
600 INJECTION, SOLUTION INTRAVENOUS ONCE
Status: COMPLETED | OUTPATIENT
Start: 2024-11-09 | End: 2024-11-09

## 2024-11-09 RX ORDER — SODIUM CHLORIDE 0.9 % (FLUSH) 0.9 %
5-40 SYRINGE (ML) INJECTION EVERY 12 HOURS SCHEDULED
Status: DISCONTINUED | OUTPATIENT
Start: 2024-11-09 | End: 2024-11-13 | Stop reason: HOSPADM

## 2024-11-09 RX ORDER — IOPAMIDOL 755 MG/ML
100 INJECTION, SOLUTION INTRAVASCULAR
Status: COMPLETED | OUTPATIENT
Start: 2024-11-09 | End: 2024-11-09

## 2024-11-09 RX ORDER — LIDOCAINE HYDROCHLORIDE 20 MG/ML
15 SOLUTION OROPHARYNGEAL
Status: ACTIVE | OUTPATIENT
Start: 2024-11-09 | End: 2024-11-09

## 2024-11-09 RX ORDER — ONDANSETRON 4 MG/1
4 TABLET, ORALLY DISINTEGRATING ORAL EVERY 8 HOURS PRN
Status: DISCONTINUED | OUTPATIENT
Start: 2024-11-09 | End: 2024-11-11

## 2024-11-09 RX ORDER — LINEZOLID 2 MG/ML
600 INJECTION, SOLUTION INTRAVENOUS EVERY 12 HOURS
Status: DISCONTINUED | OUTPATIENT
Start: 2024-11-10 | End: 2024-11-11

## 2024-11-09 RX ORDER — ACETAMINOPHEN 650 MG/1
650 SUPPOSITORY RECTAL EVERY 6 HOURS PRN
Status: DISCONTINUED | OUTPATIENT
Start: 2024-11-09 | End: 2024-11-11

## 2024-11-09 RX ORDER — ONDANSETRON 2 MG/ML
4 INJECTION INTRAMUSCULAR; INTRAVENOUS
Status: COMPLETED | OUTPATIENT
Start: 2024-11-09 | End: 2024-11-09

## 2024-11-09 RX ADMIN — PANTOPRAZOLE SODIUM 40 MG: 40 INJECTION, POWDER, FOR SOLUTION INTRAVENOUS at 10:56

## 2024-11-09 RX ADMIN — SODIUM CHLORIDE 1000 ML: 9 INJECTION, SOLUTION INTRAVENOUS at 12:37

## 2024-11-09 RX ADMIN — CEFEPIME 1000 MG: 1 INJECTION, POWDER, FOR SOLUTION INTRAMUSCULAR; INTRAVENOUS at 22:48

## 2024-11-09 RX ADMIN — AZITHROMYCIN MONOHYDRATE 500 MG: 500 INJECTION, POWDER, LYOPHILIZED, FOR SOLUTION INTRAVENOUS at 13:51

## 2024-11-09 RX ADMIN — ONDANSETRON 4 MG: 2 INJECTION INTRAMUSCULAR; INTRAVENOUS at 10:58

## 2024-11-09 RX ADMIN — SODIUM CHLORIDE: 9 INJECTION, SOLUTION INTRAVENOUS at 18:48

## 2024-11-09 RX ADMIN — IPRATROPIUM BROMIDE AND ALBUTEROL SULFATE 1 DOSE: 2.5; .5 SOLUTION RESPIRATORY (INHALATION) at 11:30

## 2024-11-09 RX ADMIN — SODIUM CHLORIDE, PRESERVATIVE FREE 10 ML: 5 INJECTION INTRAVENOUS at 21:23

## 2024-11-09 RX ADMIN — SODIUM CHLORIDE, PRESERVATIVE FREE 40 MG: 5 INJECTION INTRAVENOUS at 22:46

## 2024-11-09 RX ADMIN — CEFEPIME 2000 MG: 2 INJECTION, POWDER, FOR SOLUTION INTRAVENOUS at 10:59

## 2024-11-09 RX ADMIN — LINEZOLID 600 MG: 600 INJECTION, SOLUTION INTRAVENOUS at 15:16

## 2024-11-09 RX ADMIN — SODIUM CHLORIDE 250 ML: 9 INJECTION, SOLUTION INTRAVENOUS at 19:58

## 2024-11-09 RX ADMIN — IOPAMIDOL 100 ML: 755 INJECTION, SOLUTION INTRAVENOUS at 12:17

## 2024-11-09 ASSESSMENT — PAIN SCALES - GENERAL
PAINLEVEL_OUTOF10: 0
PAINLEVEL_OUTOF10: 0

## 2024-11-09 NOTE — ED TRIAGE NOTES
Pt arrives via EMS coming from [facility] with staff reporting patient having coffee colored emesis; onset last night. EMS reports patient complaining of abdominal; has confusion at baseline since recent hospital admission per staff. Pt dyspneic on arrival with audible rales that EMS reports just started. /74, HR 99, Sinus tach, 98.1, . 18g IV right forearm. 600mL of LR given en route.

## 2024-11-09 NOTE — ED PROVIDER NOTES
Emergency Department Provider Note       PCP: Mirta Walker APRN - MÓNICA   Age: 74 y.o.   Sex: male     DISPOSITION Decision To Admit 11/09/2024 12:59:02 PM    ICD-10-CM    1. SBO (small bowel obstruction) ([facility])  K56.609       2. Respiratory distress  R06.03       3. Hematemesis, unspecified whether nausea present  K92.0       4. HAM (acute kidney injury) ([facility])  N17.9       5. Lactic acid acidosis  E87.20       6. Pneumonia of both lower lobes due to infectious organism  J18.9           Medical Decision Making     74 y.o. male with PMHx of HTN, CVA, poor medication compliance, frequent falls secondary to EtOH, peripheral neuropathy, tobacco use disorder, HLD, OA, w/ recent hospitalization on 10/15/2024 for concern for hemorrhagic shock 2/2 UGIB (Hb down to 5.4 on arrival) who presents via EMS from [facility] with staff reporting patient with multiple episodes of coffee-ground emesis overnight with worsening confusion, abdominal discomfort and worsening shortness of breath.  Patient noted to be dyspneic on arrival requiring supplemental O2 which she does not receive at baseline.  Patient currently requiring 8 L O2 via nasal cannula.  Patient given around 500 to 700 mL normal saline and route.  Tachypneic; hypoxic. Afebrile. Normotensive.   WBC 6.5. H&H 12.9/42.4; on 55/5/24 11/35.1.  HAM w/ Cr 1.97 and BUN 26. Bicarb 17, AG 24. Glucose 175.   Covered with Cefepime over concern for aspiration PNA.  Additional 2 L normal saline IV fluid bolus ordered on arrival.  CXR w/ trace effusion on right. Prominence of central pulm vasculature. Dilated air-filled loops of bowel noted to upper abdomen.   X-ray KUB w/ contrast noted throughout the colon. Multiple dilated air-filled  loops of bowel noted at the abdomen measuring up to 5.7 cm concerning for bowel  obstruction. Contrast reaches the rectum suggesting partial bowel obstruction.  Recommend CT abdomen and pelvis for further evaluation.  Ultimately

## 2024-11-09 NOTE — H&P
Collection Time: 11/09/24 10:39 AM   Result Value Ref Range    Lactic Acid, Sepsis 11.2 (HH) 0.5 - 2.0 MMOL/L   Procalcitonin    Collection Time: 11/09/24 10:39 AM   Result Value Ref Range    Procalcitonin 0.80 (H) 0.00 - 0.10 ng/mL   Lipase    Collection Time: 11/09/24 10:39 AM   Result Value Ref Range    Lipase 13 13 - 60 U/L   Protime-INR    Collection Time: 11/09/24 10:39 AM   Result Value Ref Range    Protime 16.6 (H) 11.3 - 14.9 sec    INR 1.3     Alcohol    Collection Time: 11/09/24 10:39 AM   Result Value Ref Range    Ethanol Lvl <11 MG/DL   TYPE AND SCREEN    Collection Time: 11/09/24 10:44 AM   Result Value Ref Range    Crossmatch expiration date 11/12/2024,2359     ABO/Rh B POSITIVE     Antibody Screen NEG    Urinalysis w/Reflex to Micro    Collection Time: 11/09/24 12:39 PM   Result Value Ref Range    Color, UA DARK YELLOW      Appearance CLOUDY      Specific Gravity, UA 1.028 (H) 1.001 - 1.023      pH, Urine 5.5 5.0 - 9.0      Protein, UA 30 (A) NEG mg/dL    Glucose, Ur Negative NEG mg/dL    Ketones, Urine 15 (A) NEG mg/dL    Bilirubin, Urine SMALL (A) NEG      Blood, Urine Negative NEG      Urobilinogen, Urine 1.0 0.2 - 1.0 EU/dL    Nitrite, Urine Negative NEG      Leukocyte Esterase, Urine Negative NEG      WBC, UA 0-3 0 /hpf    RBC, UA 0-3 0 /hpf    Epithelial Cells, UA 3-5 0 /hpf    BACTERIA, URINE 1+ (H) 0 /hpf    Casts GRANULAR 0 /lpf    Amorphous Crystal 2+ (H) 0    Mucus, UA 1+ (H) 0 /lpf   Lactate, Sepsis    Collection Time: 11/09/24  1:34 PM   Result Value Ref Range    Lactic Acid, Sepsis 6.4 (HH) 0.5 - 2.0 MMOL/L   Lactic Acid    Collection Time: 11/09/24  5:26 PM   Result Value Ref Range    Lactic Acid 7.5 (HH) 0.5 - 2.0 mmol/L       No results for input(s): \"COVID19\" in the last 72 hours.    XR ABDOMEN (KUB) (SINGLE AP VIEW)    Result Date: 11/9/2024  KUB INDICATION:   Enteric tube placement COMPARISON: 9 November 2024 TECHNIQUE: Supine views of the abdomen were obtained.

## 2024-11-10 ENCOUNTER — APPOINTMENT (OUTPATIENT)
Dept: NON INVASIVE DIAGNOSTICS | Age: 74
DRG: 871 | End: 2024-11-10
Attending: HOSPITALIST
Payer: MEDICARE

## 2024-11-10 ENCOUNTER — APPOINTMENT (OUTPATIENT)
Dept: GENERAL RADIOLOGY | Age: 74
DRG: 871 | End: 2024-11-10
Payer: MEDICARE

## 2024-11-10 PROBLEM — J69.0 ASPIRATION PNEUMONIA OF BOTH LUNGS (HCC): Status: ACTIVE | Noted: 2024-11-10

## 2024-11-10 PROBLEM — J96.01 ACUTE RESPIRATORY FAILURE WITH HYPOXIA: Status: ACTIVE | Noted: 2024-11-10

## 2024-11-10 LAB
ANION GAP SERPL CALC-SCNC: 13 MMOL/L (ref 7–16)
BASOPHILS # BLD: 0.1 K/UL (ref 0–0.2)
BASOPHILS NFR BLD: 1 % (ref 0–2)
BUN SERPL-MCNC: 26 MG/DL (ref 8–23)
CALCIUM SERPL-MCNC: 8.6 MG/DL (ref 8.8–10.2)
CHLORIDE SERPL-SCNC: 112 MMOL/L (ref 98–107)
CO2 SERPL-SCNC: 23 MMOL/L (ref 20–29)
CREAT SERPL-MCNC: 1.66 MG/DL (ref 0.8–1.3)
DIFFERENTIAL METHOD BLD: ABNORMAL
DIFFERENTIAL METHOD BLD: ABNORMAL
EOSINOPHIL # BLD: 0 K/UL (ref 0–0.8)
EOSINOPHIL NFR BLD: 0 % (ref 0.5–7.8)
ERYTHROCYTE [DISTWIDTH] IN BLOOD BY AUTOMATED COUNT: 18.2 % (ref 11.9–14.6)
ERYTHROCYTE [DISTWIDTH] IN BLOOD BY AUTOMATED COUNT: 18.9 % (ref 11.9–14.6)
EST. AVERAGE GLUCOSE BLD GHB EST-MCNC: 113 MG/DL
GLUCOSE BLD STRIP.AUTO-MCNC: 114 MG/DL (ref 65–100)
GLUCOSE SERPL-MCNC: 120 MG/DL (ref 70–99)
HBA1C MFR BLD: 5.6 % (ref 0–5.6)
HCT VFR BLD AUTO: 34.6 % (ref 41.1–50.3)
HCT VFR BLD AUTO: 35.8 % (ref 41.1–50.3)
HGB BLD-MCNC: 10.8 G/DL (ref 13.6–17.2)
HGB BLD-MCNC: 11 G/DL (ref 13.6–17.2)
IMM GRANULOCYTES # BLD AUTO: 0.1 K/UL (ref 0–0.5)
IMM GRANULOCYTES NFR BLD AUTO: 1 % (ref 0–5)
LACTATE SERPL-SCNC: 2.5 MMOL/L (ref 0.5–2)
LACTATE SERPL-SCNC: 3.2 MMOL/L (ref 0.5–2)
LACTATE SERPL-SCNC: 3.8 MMOL/L (ref 0.5–2)
LACTATE SERPL-SCNC: 5.2 MMOL/L (ref 0.5–2)
LYMPHOCYTES # BLD: 0.8 K/UL (ref 0.5–4.6)
LYMPHOCYTES # BLD: 1 K/UL (ref 0.5–4.6)
LYMPHOCYTES NFR BLD MANUAL: 12 % (ref 16–44)
LYMPHOCYTES NFR BLD: 9 % (ref 13–44)
MCH RBC QN AUTO: 30.1 PG (ref 26.1–32.9)
MCH RBC QN AUTO: 30.7 PG (ref 26.1–32.9)
MCHC RBC AUTO-ENTMCNC: 30.7 G/DL (ref 31.4–35)
MCHC RBC AUTO-ENTMCNC: 31.2 G/DL (ref 31.4–35)
MCV RBC AUTO: 98.1 FL (ref 82–102)
MCV RBC AUTO: 98.3 FL (ref 82–102)
MONOCYTES # BLD: 0.5 K/UL (ref 0.1–1.3)
MONOCYTES # BLD: 0.9 K/UL (ref 0.1–1.3)
MONOCYTES NFR BLD MANUAL: 10 % (ref 3–9)
MONOCYTES NFR BLD: 6 % (ref 4–12)
NEUTS SEG # BLD: 6.6 K/UL (ref 1.7–8.2)
NEUTS SEG # BLD: 7.4 K/UL (ref 1.7–8.2)
NEUTS SEG NFR BLD MANUAL: 78 % (ref 47–75)
NEUTS SEG NFR BLD: 83 % (ref 43–78)
NRBC # BLD: 0 K/UL (ref 0–0.2)
NRBC # BLD: 0 K/UL (ref 0–0.2)
NT PRO BNP: 1125 PG/ML (ref 0–125)
PLATELET # BLD AUTO: 216 K/UL (ref 150–450)
PLATELET # BLD AUTO: 217 K/UL (ref 150–450)
PLATELET COMMENT: ADEQUATE
PLATELET COMMENT: ADEQUATE
PMV BLD AUTO: 9.6 FL (ref 9.4–12.3)
PMV BLD AUTO: 9.8 FL (ref 9.4–12.3)
POTASSIUM SERPL-SCNC: 4.6 MMOL/L (ref 3.5–5.1)
RBC # BLD AUTO: 3.52 M/UL (ref 4.23–5.6)
RBC # BLD AUTO: 3.65 M/UL (ref 4.23–5.6)
RBC MORPH BLD: ABNORMAL
RBC MORPH BLD: ABNORMAL
SERVICE CMNT-IMP: ABNORMAL
SODIUM SERPL-SCNC: 148 MMOL/L (ref 136–145)
WBC # BLD AUTO: 8.5 K/UL (ref 4.3–11.1)
WBC # BLD AUTO: 8.9 K/UL (ref 4.3–11.1)
WBC MORPH BLD: ABNORMAL
WBC MORPH BLD: ABNORMAL

## 2024-11-10 PROCEDURE — 99222 1ST HOSP IP/OBS MODERATE 55: CPT | Performed by: INTERNAL MEDICINE

## 2024-11-10 PROCEDURE — 6360000002 HC RX W HCPCS: Performed by: HOSPITALIST

## 2024-11-10 PROCEDURE — 2580000003 HC RX 258: Performed by: FAMILY MEDICINE

## 2024-11-10 PROCEDURE — 6370000000 HC RX 637 (ALT 250 FOR IP): Performed by: HOSPITALIST

## 2024-11-10 PROCEDURE — 6360000002 HC RX W HCPCS: Performed by: FAMILY MEDICINE

## 2024-11-10 PROCEDURE — 2500000003 HC RX 250 WO HCPCS: Performed by: HOSPITALIST

## 2024-11-10 PROCEDURE — 83880 ASSAY OF NATRIURETIC PEPTIDE: CPT

## 2024-11-10 PROCEDURE — 83605 ASSAY OF LACTIC ACID: CPT

## 2024-11-10 PROCEDURE — 97161 PT EVAL LOW COMPLEX 20 MIN: CPT

## 2024-11-10 PROCEDURE — 6360000002 HC RX W HCPCS: Performed by: INTERNAL MEDICINE

## 2024-11-10 PROCEDURE — 36415 COLL VENOUS BLD VENIPUNCTURE: CPT

## 2024-11-10 PROCEDURE — 2580000003 HC RX 258: Performed by: HOSPITALIST

## 2024-11-10 PROCEDURE — 80048 BASIC METABOLIC PNL TOTAL CA: CPT

## 2024-11-10 PROCEDURE — 2580000003 HC RX 258: Performed by: INTERNAL MEDICINE

## 2024-11-10 PROCEDURE — 83036 HEMOGLOBIN GLYCOSYLATED A1C: CPT

## 2024-11-10 PROCEDURE — 97530 THERAPEUTIC ACTIVITIES: CPT

## 2024-11-10 PROCEDURE — 74018 RADEX ABDOMEN 1 VIEW: CPT

## 2024-11-10 PROCEDURE — 94640 AIRWAY INHALATION TREATMENT: CPT

## 2024-11-10 PROCEDURE — 82962 GLUCOSE BLOOD TEST: CPT

## 2024-11-10 PROCEDURE — 2100000000 HC CCU R&B

## 2024-11-10 PROCEDURE — 99231 SBSQ HOSP IP/OBS SF/LOW 25: CPT | Performed by: SURGERY

## 2024-11-10 PROCEDURE — 85025 COMPLETE CBC W/AUTO DIFF WBC: CPT

## 2024-11-10 PROCEDURE — 2700000000 HC OXYGEN THERAPY PER DAY

## 2024-11-10 RX ORDER — OLANZAPINE 5 MG/1
5 TABLET, ORALLY DISINTEGRATING ORAL NIGHTLY
Status: DISCONTINUED | OUTPATIENT
Start: 2024-11-10 | End: 2024-11-13

## 2024-11-10 RX ORDER — DEXTROSE MONOHYDRATE 50 MG/ML
INJECTION, SOLUTION INTRAVENOUS CONTINUOUS
Status: DISCONTINUED | OUTPATIENT
Start: 2024-11-10 | End: 2024-11-11

## 2024-11-10 RX ORDER — LEVALBUTEROL INHALATION SOLUTION 0.63 MG/3ML
0.63 SOLUTION RESPIRATORY (INHALATION)
Status: DISCONTINUED | OUTPATIENT
Start: 2024-11-10 | End: 2024-11-11

## 2024-11-10 RX ORDER — FUROSEMIDE 10 MG/ML
20 INJECTION INTRAMUSCULAR; INTRAVENOUS ONCE
Status: COMPLETED | OUTPATIENT
Start: 2024-11-10 | End: 2024-11-10

## 2024-11-10 RX ORDER — MIDODRINE HYDROCHLORIDE 5 MG/1
5 TABLET ORAL 3 TIMES DAILY
Status: DISCONTINUED | OUTPATIENT
Start: 2024-11-10 | End: 2024-11-10

## 2024-11-10 RX ORDER — LEVALBUTEROL INHALATION SOLUTION 0.63 MG/3ML
0.63 SOLUTION RESPIRATORY (INHALATION)
Status: DISCONTINUED | OUTPATIENT
Start: 2024-11-10 | End: 2024-11-10

## 2024-11-10 RX ORDER — MIDODRINE HYDROCHLORIDE 5 MG/1
5 TABLET ORAL 3 TIMES DAILY
Status: DISCONTINUED | OUTPATIENT
Start: 2024-11-10 | End: 2024-11-11

## 2024-11-10 RX ADMIN — SODIUM CHLORIDE, PRESERVATIVE FREE 10 ML: 5 INJECTION INTRAVENOUS at 20:23

## 2024-11-10 RX ADMIN — SODIUM CHLORIDE, PRESERVATIVE FREE 40 MG: 5 INJECTION INTRAVENOUS at 09:37

## 2024-11-10 RX ADMIN — SODIUM BICARBONATE 50 MEQ: 84 INJECTION, SOLUTION INTRAVENOUS at 09:36

## 2024-11-10 RX ADMIN — SODIUM CHLORIDE, PRESERVATIVE FREE 10 ML: 5 INJECTION INTRAVENOUS at 09:43

## 2024-11-10 RX ADMIN — MIDODRINE HYDROCHLORIDE 5 MG: 5 TABLET ORAL at 20:22

## 2024-11-10 RX ADMIN — SODIUM CHLORIDE: 9 INJECTION, SOLUTION INTRAVENOUS at 03:30

## 2024-11-10 RX ADMIN — MIDODRINE HYDROCHLORIDE 5 MG: 5 TABLET ORAL at 09:36

## 2024-11-10 RX ADMIN — SODIUM CHLORIDE: 9 INJECTION, SOLUTION INTRAVENOUS at 10:59

## 2024-11-10 RX ADMIN — DEXTROSE MONOHYDRATE: 50 INJECTION, SOLUTION INTRAVENOUS at 22:34

## 2024-11-10 RX ADMIN — LINEZOLID 600 MG: 600 INJECTION, SOLUTION INTRAVENOUS at 04:45

## 2024-11-10 RX ADMIN — METRONIDAZOLE 500 MG: 500 INJECTION, SOLUTION INTRAVENOUS at 17:40

## 2024-11-10 RX ADMIN — SODIUM CHLORIDE, PRESERVATIVE FREE 40 MG: 5 INJECTION INTRAVENOUS at 20:22

## 2024-11-10 RX ADMIN — DEXTROSE MONOHYDRATE: 50 INJECTION, SOLUTION INTRAVENOUS at 10:04

## 2024-11-10 RX ADMIN — WATER 2000 MG: 1 INJECTION INTRAMUSCULAR; INTRAVENOUS; SUBCUTANEOUS at 22:29

## 2024-11-10 RX ADMIN — METRONIDAZOLE 500 MG: 500 INJECTION, SOLUTION INTRAVENOUS at 02:33

## 2024-11-10 RX ADMIN — LINEZOLID 600 MG: 600 INJECTION, SOLUTION INTRAVENOUS at 15:36

## 2024-11-10 RX ADMIN — MIDODRINE HYDROCHLORIDE 5 MG: 5 TABLET ORAL at 15:33

## 2024-11-10 RX ADMIN — FUROSEMIDE 20 MG: 10 INJECTION, SOLUTION INTRAMUSCULAR; INTRAVENOUS at 02:27

## 2024-11-10 RX ADMIN — OLANZAPINE 5 MG: 5 TABLET, ORALLY DISINTEGRATING ORAL at 22:54

## 2024-11-10 RX ADMIN — LEVALBUTEROL HYDROCHLORIDE 0.63 MG: 0.63 SOLUTION RESPIRATORY (INHALATION) at 16:25

## 2024-11-10 RX ADMIN — METRONIDAZOLE 500 MG: 500 INJECTION, SOLUTION INTRAVENOUS at 09:38

## 2024-11-10 RX ADMIN — LEVALBUTEROL HYDROCHLORIDE 0.63 MG: 0.63 SOLUTION RESPIRATORY (INHALATION) at 11:23

## 2024-11-10 RX ADMIN — LEVALBUTEROL HYDROCHLORIDE 0.63 MG: 0.63 SOLUTION RESPIRATORY (INHALATION) at 19:27

## 2024-11-10 RX ADMIN — WATER 2000 MG: 1 INJECTION INTRAMUSCULAR; INTRAVENOUS; SUBCUTANEOUS at 11:30

## 2024-11-10 ASSESSMENT — PAIN SCALES - GENERAL
PAINLEVEL_OUTOF10: 0

## 2024-11-10 NOTE — ED NOTES
TRANSFER - OUT REPORT:    Verbal report given to BORIS Johnson on [address]  being transferred to [place] [id number] for routine progression of patient care       Report consisted of patient's Situation, Background, Assessment and   Recommendations(SBAR).     Information from the following report(s) ED Encounter Summary, ED SBAR, Intake/Output, MAR, and Recent Results was reviewed with the receiving nurse.           Lines:   Peripheral IV 11/09/24 Right;Dorsal Forearm (Active)   Site Assessment Clean, dry & intact 11/10/24 0711   Line Status Flushed;Normal saline locked 11/10/24 0711   Line Care Connections checked and tightened 11/10/24 0711   Phlebitis Assessment No symptoms 11/10/24 0711   Infiltration Assessment 0 11/10/24 0711   Alcohol Cap Used Yes 11/10/24 0711   Dressing Status Clean, dry & intact 11/10/24 0711   Dressing Type Transparent 11/10/24 0711       Peripheral IV 11/09/24 Proximal;Right Forearm (Active)   Site Assessment Clean, dry & intact 11/10/24 0711   Line Status Infusing 11/10/24 0711   Line Care Connections checked and tightened 11/10/24 0711   Phlebitis Assessment No symptoms 11/10/24 0711   Infiltration Assessment 0 11/10/24 0711   Alcohol Cap Used Yes 11/10/24 0711   Dressing Status Clean, dry & intact 11/10/24 0711   Dressing Type Transparent 11/10/24 0711        Opportunity for questions and clarification was provided.      Patient transported with:  Monitor, O2 @ 8lpm, and Registered Nurse           Yancy Cuellar RN  11/10/24 [id number]

## 2024-11-10 NOTE — PLAN OF CARE
Problem: Chronic Conditions and Co-morbidities  Goal: Patient's chronic conditions and co-morbidity symptoms are monitored and maintained or improved  Outcome: Progressing  Flowsheets (Taken 11/9/2024 1915)  Care Plan - Patient's Chronic Conditions and Co-Morbidity Symptoms are Monitored and Maintained or Improved: Monitor and assess patient's chronic conditions and comorbid symptoms for stability, deterioration, or improvement     Problem: Discharge Planning  Goal: Discharge to home or other facility with appropriate resources  Outcome: Progressing  Flowsheets (Taken 11/9/2024 1915)  Discharge to home or other facility with appropriate resources: Identify barriers to discharge with patient and caregiver     Problem: Safety - Adult  Goal: Free from fall injury  Outcome: Progressing  Flowsheets (Taken 11/9/2024 1915)  Free From Fall Injury: Instruct family/caregiver on patient safety

## 2024-11-11 ENCOUNTER — APPOINTMENT (OUTPATIENT)
Dept: NON INVASIVE DIAGNOSTICS | Age: 74
DRG: 871 | End: 2024-11-11
Attending: HOSPITALIST
Payer: MEDICARE

## 2024-11-11 ENCOUNTER — APPOINTMENT (OUTPATIENT)
Dept: GENERAL RADIOLOGY | Age: 74
DRG: 871 | End: 2024-11-11
Payer: MEDICARE

## 2024-11-11 LAB
ANION GAP SERPL CALC-SCNC: 10 MMOL/L (ref 7–16)
BASOPHILS # BLD: 0 K/UL (ref 0–0.2)
BASOPHILS NFR BLD: 0 % (ref 0–2)
BUN SERPL-MCNC: 21 MG/DL (ref 8–23)
CALCIUM SERPL-MCNC: 8 MG/DL (ref 8.8–10.2)
CHLORIDE SERPL-SCNC: 107 MMOL/L (ref 98–107)
CO2 SERPL-SCNC: 22 MMOL/L (ref 20–29)
CREAT SERPL-MCNC: 1.35 MG/DL (ref 0.8–1.3)
DIFFERENTIAL METHOD BLD: ABNORMAL
ECHO AO ROOT DIAM: 3.6 CM
ECHO AV AREA PEAK VELOCITY: 2.2 CM2
ECHO AV AREA VTI: 2.3 CM2
ECHO AV MEAN GRADIENT: 6 MMHG
ECHO AV MEAN VELOCITY: 1.1 M/S
ECHO AV PEAK GRADIENT: 10 MMHG
ECHO AV PEAK VELOCITY: 1.6 M/S
ECHO AV VELOCITY RATIO: 0.75
ECHO AV VTI: 25 CM
ECHO EST RA PRESSURE: 3 MMHG
ECHO LA DIAMETER: 3.2 CM
ECHO LA TO AORTIC ROOT RATIO: 0.89
ECHO LV E' LATERAL VELOCITY: 5.7 CM/S
ECHO LV EDV A2C: 34 ML
ECHO LV EDV A4C: 56 ML
ECHO LV EJECTION FRACTION A2C: 63 %
ECHO LV EJECTION FRACTION A4C: 66 %
ECHO LV EJECTION FRACTION BIPLANE: 66 % (ref 55–100)
ECHO LV ESV A2C: 13 ML
ECHO LV ESV A4C: 19 ML
ECHO LV FRACTIONAL SHORTENING: 37 % (ref 28–44)
ECHO LV INTERNAL DIMENSION DIASTOLIC: 3.5 CM (ref 4.2–5.9)
ECHO LV INTERNAL DIMENSION SYSTOLIC: 2.2 CM
ECHO LV IVSD: 1 CM (ref 0.6–1)
ECHO LV MASS 2D: 95.9 G (ref 88–224)
ECHO LV POSTERIOR WALL DIASTOLIC: 0.9 CM (ref 0.6–1)
ECHO LV RELATIVE WALL THICKNESS RATIO: 0.51
ECHO LVOT AREA: 3.1 CM2
ECHO LVOT AV VTI INDEX: 0.73
ECHO LVOT DIAM: 2 CM
ECHO LVOT MEAN GRADIENT: 3 MMHG
ECHO LVOT MEAN GRADIENT: 3 MMHG
ECHO LVOT PEAK GRADIENT: 5 MMHG
ECHO LVOT PEAK VELOCITY: 1.2 M/S
ECHO LVOT SV: 57.1 ML
ECHO LVOT VTI: 18.2 CM
ECHO MV A VELOCITY: 0.95 M/S
ECHO MV AREA VTI: 3.1 CM2
ECHO MV E DECELERATION TIME (DT): 131 MS
ECHO MV E VELOCITY: 0.48 M/S
ECHO MV E/A RATIO: 0.51
ECHO MV E/E' LATERAL: 8.42
ECHO MV LVOT VTI INDEX: 1.02
ECHO MV MAX VELOCITY: 1.4 M/S
ECHO MV MEAN GRADIENT: 3 MMHG
ECHO MV MEAN VELOCITY: 0.8 M/S
ECHO MV PEAK GRADIENT: 8 MMHG
ECHO MV VTI: 18.6 CM
ECHO PV ACCELERATION TIME (AT): 90 MS
ECHO PV MAX VELOCITY: 0.9 M/S
ECHO PV PEAK GRADIENT: 3 MMHG
ECHO RIGHT VENTRICULAR SYSTOLIC PRESSURE (RVSP): 27 MMHG
ECHO RV FREE WALL PEAK S': 10.2 CM/S
ECHO TV REGURGITANT MAX VELOCITY: 2.47 M/S
ECHO TV REGURGITANT PEAK GRADIENT: 24 MMHG
EOSINOPHIL # BLD: 0.1 K/UL (ref 0–0.8)
EOSINOPHIL NFR BLD: 1 % (ref 0.5–7.8)
ERYTHROCYTE [DISTWIDTH] IN BLOOD BY AUTOMATED COUNT: 18.4 % (ref 11.9–14.6)
GLUCOSE SERPL-MCNC: 128 MG/DL (ref 70–99)
HCT VFR BLD AUTO: 32.8 % (ref 41.1–50.3)
HGB BLD-MCNC: 10.2 G/DL (ref 13.6–17.2)
IMM GRANULOCYTES # BLD AUTO: 0 K/UL (ref 0–0.5)
IMM GRANULOCYTES NFR BLD AUTO: 1 % (ref 0–5)
LACTATE SERPL-SCNC: 3 MMOL/L (ref 0.5–2)
LACTATE SERPL-SCNC: 3.1 MMOL/L (ref 0.5–2)
LYMPHOCYTES # BLD: 0.8 K/UL (ref 0.5–4.6)
LYMPHOCYTES NFR BLD: 12 % (ref 13–44)
MCH RBC QN AUTO: 30.3 PG (ref 26.1–32.9)
MCHC RBC AUTO-ENTMCNC: 31.1 G/DL (ref 31.4–35)
MCV RBC AUTO: 97.3 FL (ref 82–102)
MONOCYTES # BLD: 0.7 K/UL (ref 0.1–1.3)
MONOCYTES NFR BLD: 11 % (ref 4–12)
NEUTS SEG # BLD: 5 K/UL (ref 1.7–8.2)
NEUTS SEG NFR BLD: 75 % (ref 43–78)
NRBC # BLD: 0 K/UL (ref 0–0.2)
PLATELET # BLD AUTO: 174 K/UL (ref 150–450)
PMV BLD AUTO: 9.6 FL (ref 9.4–12.3)
POTASSIUM SERPL-SCNC: 3.6 MMOL/L (ref 3.5–5.1)
RBC # BLD AUTO: 3.37 M/UL (ref 4.23–5.6)
SARS-COV-2 RDRP RESP QL NAA+PROBE: NOT DETECTED
SODIUM SERPL-SCNC: 140 MMOL/L (ref 136–145)
SOURCE: NORMAL
WBC # BLD AUTO: 6.6 K/UL (ref 4.3–11.1)

## 2024-11-11 PROCEDURE — 85025 COMPLETE CBC W/AUTO DIFF WBC: CPT

## 2024-11-11 PROCEDURE — 6360000002 HC RX W HCPCS: Performed by: HOSPITALIST

## 2024-11-11 PROCEDURE — 2500000003 HC RX 250 WO HCPCS: Performed by: HOSPITALIST

## 2024-11-11 PROCEDURE — 83605 ASSAY OF LACTIC ACID: CPT

## 2024-11-11 PROCEDURE — 87635 SARS-COV-2 COVID-19 AMP PRB: CPT

## 2024-11-11 PROCEDURE — 80048 BASIC METABOLIC PNL TOTAL CA: CPT

## 2024-11-11 PROCEDURE — 71045 X-RAY EXAM CHEST 1 VIEW: CPT

## 2024-11-11 PROCEDURE — 93306 TTE W/DOPPLER COMPLETE: CPT

## 2024-11-11 PROCEDURE — 36415 COLL VENOUS BLD VENIPUNCTURE: CPT

## 2024-11-11 PROCEDURE — 94640 AIRWAY INHALATION TREATMENT: CPT

## 2024-11-11 PROCEDURE — 2580000003 HC RX 258: Performed by: FAMILY MEDICINE

## 2024-11-11 PROCEDURE — 6370000000 HC RX 637 (ALT 250 FOR IP): Performed by: HOSPITALIST

## 2024-11-11 PROCEDURE — 1100000000 HC RM PRIVATE

## 2024-11-11 PROCEDURE — 2700000000 HC OXYGEN THERAPY PER DAY

## 2024-11-11 PROCEDURE — 6360000002 HC RX W HCPCS: Performed by: FAMILY MEDICINE

## 2024-11-11 PROCEDURE — 2580000003 HC RX 258: Performed by: HOSPITALIST

## 2024-11-11 PROCEDURE — 76937 US GUIDE VASCULAR ACCESS: CPT

## 2024-11-11 RX ORDER — MORPHINE SULFATE 4 MG/ML
4 INJECTION, SOLUTION INTRAMUSCULAR; INTRAVENOUS
Status: DISCONTINUED | OUTPATIENT
Start: 2024-11-11 | End: 2024-11-12

## 2024-11-11 RX ORDER — MORPHINE SULFATE 2 MG/ML
2 INJECTION, SOLUTION INTRAMUSCULAR; INTRAVENOUS ONCE
Status: COMPLETED | OUTPATIENT
Start: 2024-11-11 | End: 2024-11-11

## 2024-11-11 RX ORDER — POLYETHYLENE GLYCOL 3350 17 G/17G
17 POWDER, FOR SOLUTION ORAL DAILY
Status: DISCONTINUED | OUTPATIENT
Start: 2024-11-11 | End: 2024-11-13 | Stop reason: HOSPADM

## 2024-11-11 RX ORDER — SODIUM HYPOCHLORITE 1.25 MG/ML
SOLUTION TOPICAL DAILY
Status: DISCONTINUED | OUTPATIENT
Start: 2024-11-11 | End: 2024-11-13 | Stop reason: HOSPADM

## 2024-11-11 RX ORDER — KETOROLAC TROMETHAMINE 15 MG/ML
15 INJECTION, SOLUTION INTRAMUSCULAR; INTRAVENOUS EVERY 6 HOURS PRN
Status: DISCONTINUED | OUTPATIENT
Start: 2024-11-11 | End: 2024-11-13 | Stop reason: HOSPADM

## 2024-11-11 RX ORDER — 0.9 % SODIUM CHLORIDE 0.9 %
1000 INTRAVENOUS SOLUTION INTRAVENOUS ONCE
Status: COMPLETED | OUTPATIENT
Start: 2024-11-11 | End: 2024-11-11

## 2024-11-11 RX ORDER — LEVALBUTEROL INHALATION SOLUTION 0.63 MG/3ML
0.63 SOLUTION RESPIRATORY (INHALATION)
Status: DISCONTINUED | OUTPATIENT
Start: 2024-11-11 | End: 2024-11-11

## 2024-11-11 RX ORDER — METOCLOPRAMIDE HYDROCHLORIDE 5 MG/ML
5 INJECTION INTRAMUSCULAR; INTRAVENOUS EVERY 6 HOURS
Status: DISPENSED | OUTPATIENT
Start: 2024-11-11 | End: 2024-11-13

## 2024-11-11 RX ORDER — MORPHINE SULFATE 2 MG/ML
2 INJECTION, SOLUTION INTRAMUSCULAR; INTRAVENOUS
Status: DISCONTINUED | OUTPATIENT
Start: 2024-11-11 | End: 2024-11-12

## 2024-11-11 RX ORDER — MORPHINE SULFATE 20 MG/ML
10 SOLUTION ORAL
Status: DISCONTINUED | OUTPATIENT
Start: 2024-11-11 | End: 2024-11-13 | Stop reason: HOSPADM

## 2024-11-11 RX ORDER — BISACODYL 10 MG
10 SUPPOSITORY, RECTAL RECTAL DAILY
Status: DISCONTINUED | OUTPATIENT
Start: 2024-11-11 | End: 2024-11-11

## 2024-11-11 RX ORDER — GLYCOPYRROLATE 0.2 MG/ML
0.2 INJECTION INTRAMUSCULAR; INTRAVENOUS EVERY 4 HOURS PRN
Status: DISCONTINUED | OUTPATIENT
Start: 2024-11-11 | End: 2024-11-13 | Stop reason: HOSPADM

## 2024-11-11 RX ORDER — BISACODYL 10 MG
10 SUPPOSITORY, RECTAL RECTAL DAILY PRN
Status: DISCONTINUED | OUTPATIENT
Start: 2024-11-11 | End: 2024-11-13 | Stop reason: HOSPADM

## 2024-11-11 RX ORDER — DEXTROSE MONOHYDRATE AND SODIUM CHLORIDE 5; .45 G/100ML; G/100ML
INJECTION, SOLUTION INTRAVENOUS CONTINUOUS
Status: DISCONTINUED | OUTPATIENT
Start: 2024-11-11 | End: 2024-11-11

## 2024-11-11 RX ADMIN — METOCLOPRAMIDE 5 MG: 5 INJECTION, SOLUTION INTRAMUSCULAR; INTRAVENOUS at 20:17

## 2024-11-11 RX ADMIN — SODIUM CHLORIDE 1000 ML: 9 INJECTION, SOLUTION INTRAVENOUS at 07:54

## 2024-11-11 RX ADMIN — SODIUM CHLORIDE, PRESERVATIVE FREE 40 MG: 5 INJECTION INTRAVENOUS at 10:25

## 2024-11-11 RX ADMIN — WATER 2000 MG: 1 INJECTION INTRAMUSCULAR; INTRAVENOUS; SUBCUTANEOUS at 11:16

## 2024-11-11 RX ADMIN — MIDODRINE HYDROCHLORIDE 5 MG: 5 TABLET ORAL at 10:25

## 2024-11-11 RX ADMIN — METOCLOPRAMIDE 5 MG: 5 INJECTION, SOLUTION INTRAMUSCULAR; INTRAVENOUS at 11:13

## 2024-11-11 RX ADMIN — OLANZAPINE 5 MG: 5 TABLET, ORALLY DISINTEGRATING ORAL at 20:12

## 2024-11-11 RX ADMIN — MORPHINE SULFATE 2 MG: 2 INJECTION, SOLUTION INTRAMUSCULAR; INTRAVENOUS at 12:13

## 2024-11-11 RX ADMIN — LINEZOLID 600 MG: 600 INJECTION, SOLUTION INTRAVENOUS at 03:48

## 2024-11-11 RX ADMIN — METRONIDAZOLE 500 MG: 500 INJECTION, SOLUTION INTRAVENOUS at 01:33

## 2024-11-11 RX ADMIN — SODIUM CHLORIDE, PRESERVATIVE FREE 10 ML: 5 INJECTION INTRAVENOUS at 20:12

## 2024-11-11 RX ADMIN — DEXTROSE AND SODIUM CHLORIDE: 5; 450 INJECTION, SOLUTION INTRAVENOUS at 11:10

## 2024-11-11 RX ADMIN — SODIUM CHLORIDE 2 MG: 9 INJECTION INTRAMUSCULAR; INTRAVENOUS; SUBCUTANEOUS at 12:19

## 2024-11-11 RX ADMIN — LEVALBUTEROL HYDROCHLORIDE 0.63 MG: 0.63 SOLUTION RESPIRATORY (INHALATION) at 08:26

## 2024-11-11 RX ADMIN — SODIUM CHLORIDE 2 MG: 9 INJECTION INTRAMUSCULAR; INTRAVENOUS; SUBCUTANEOUS at 20:17

## 2024-11-11 ASSESSMENT — PAIN SCALES - GENERAL
PAINLEVEL_OUTOF10: 0

## 2024-11-11 NOTE — PLAN OF CARE
Problem: Skin/Tissue Integrity  Goal: Absence of new skin breakdown  11/10/2024 5285 by Carli Goncalves, RN  Outcome: Progressing  11/10/2024 1545 by Elizabeth Carmona, RN  Outcome: Progressing     Problem: Confusion  Goal: Confusion, delirium, dementia, or psychosis is improved or at baseline  Outcome: Progressing

## 2024-11-11 NOTE — CARE COORDINATION
Case Management Assessment  Initial Evaluation    Date/Time of Evaluation: 11/11/2024 4:17 PM  Assessment Completed by: Anita Byrd RN    If patient is discharged prior to next notation, then this note serves as note for discharge by case management.    Patient Name: Rashel Lane                   YOB: 1950  Diagnosis: SBO (small bowel obstruction) ([facility]) [K56.609]  Respiratory distress [R06.03]  Lactic acid acidosis [E87.20]  HAM (acute kidney injury) ([facility]) [N17.9]  Septic shock ([facility]) [A41.9, R65.21]  Pneumonia of both lower lobes due to infectious organism [J18.9]  Hematemesis, unspecified whether nausea present [K92.0]                   Date / Time: 11/9/2024  9:54 AM    Patient Admission Status: Inpatient   Readmission Risk (Low < 19, Mod (19-27), High > 27): Readmission Risk Score: 25.1    Current PCP: Mirta Walker APRN - NP  PCP verified by CM? (P) Yes    Chart Reviewed: Yes      History Provided by: (P) Child/Family  Patient Orientation: (P) Unable to Assess    Patient Cognition: (P) Other (see comment) (not responsive)    Hospitalization in the last 30 days (Readmission):  Yes    If yes, Readmission Assessment in  Navigator will be completed.    Advance Directives:      Code Status: DNR   Patient's Primary Decision Maker is: (P) Legal Next of Kin      Discharge Planning:    Patient lives with:   Type of Home: (P) Other (Comment) (pending)  Primary Care Giver: (P) Other (Comment) (SNF)  Patient Support Systems include: (P) Children   Current Financial resources: (P) Medicare, Medicaid ([facility])  Current community resources: (P) Other (Comment) ([facility])  Current services prior to admission: (P) Skilled Nursing Facility            Current DME:  facility            Type of Home Care services:   n/a    ADLS  Prior functional level: (P) Assistance with the following:  Current functional level: (P) Assistance with the following:    PT AM-PAC: 8 /24  OT

## 2024-11-11 NOTE — INTERDISCIPLINARY ROUNDS
Multi-D Rounds/Checklist (leapfrog):  Lines: can any be removed?: None    NG/OG/NJ/NE Tube Nasogastric 16 fr Right nostril (Active)       External Urinary Catheter (Active)      DVT Prophylaxis: Ordered  Vent: N/A  Nutrition Ordered/appropriate: Per Primary Team  Can antibiotics or other drugs be stopped? Yes/End Date set Yes/No  MRSA swab:   Inpat Anti-Infectives (From admission, onward)       Start     Ordered Stop    11/10/24 1100  ceFEPIme (MAXIPIME) 2,000 mg in sterile water 20 mL IV syringe  2,000 mg,   IntraVENous,   EVERY 12 HOURS         11/10/24 1044 --    11/10/24 0400  linezolid (ZYVOX) IVPB 600 mg  600 mg,   IntraVENous,   EVERY 12 HOURS         11/09/24 1735 --    11/09/24 1745  metroNIDAZOLE (FLAGYL) 500 mg in 0.9% NaCl 100 mL IVPB premix  500 mg,   IntraVENous,   EVERY 8 HOURS         11/09/24 1735 --                  Consults needed: None  A: Is pain control adequate? (has PRNs? Stop drip?) Yes  B: Sedation break and SBT? N/A  C: Is sedation choice appropriate? N/A  D: Delirium/CAM-ICU? No  E: Mobility goals/appropriateness? N/A  F: Family update and plan? Daughter is surrogate decision maker and is being updated daily by primary attending and nursing staff.    KEENA Monique

## 2024-11-11 NOTE — CONSULTS
US Guided PIV access    Called for assistance with IV access. Ultrasound was used to find the vein which was compressible and does not have any features of an artery or nerve bundle. Skin was cleaned and disinfected prior to IV puncture. Under real-time ultrasound guidance, peripheral access was obtained in the left upper arm using 22 G 1.75\" Peripheral IV catheter x 1 attempt. Blood return was present and IV flushed without difficulty. IV dressing applied, no immediate complications noted, and patient tolerated the procedure well.      
endoscopy at this time   - Continue IV PPI BID   - Monitor for active GI bleeding   - Continue NGT to low intermittent suction as needed   - GI team will sign off at this time, please call with questions or concerns       MD Madi Paulino [facility]   
exposure control, adjustment of the mA and/or kVp according to  patient's size, iterative reconstruction.    COMPARISON: None    FINDINGS: Limited evaluation due to motion and extensive artifact from arm  position.  - LUNGS: Right greater than left lower lobe infiltrates/consolidations. No  significant pleural effusion.  - MEDIASTINUM/AXILLA: No significant adenopathy. Right neck 1.7 cm cyst.  - HEART/VESSELS: Normal.  - CHEST WALL: Normal.    - LIVER: Normal in size and appearance.  - GALLBLADDER/BILE DUCTS: No gallstones or bile duct dilatation.  - PANCREAS: Normal.  - SPLEEN: Normal.    - ADRENALS:  Normal.  - KIDNEYS/URETERS: No hydronephrosis or significant mass.  - BLADDER: Normal.  - REPRODUCTIVE ORGANS: No pelvic masses.    - BOWEL: Distended small bowel loops with distal small bowel loop collapse.  - LYMPH NODES: No significant retroperitoneal, mesenteric, or pelvic adenopathy.  - BONES: No fracture or significant bone lesion.  - VASCULATURE: Normal  - OTHER: No ascites. Right inguinal bowel-containing hernia.    Impression  Small bowel obstruction. Surgical consultation recommended.    Right greater than left lower lobe infiltrates/consolidations. Correlate  clinically for infectious process.    Right inguinal bowel-containing hernia    Results conveyed to Dr. Bach on 11/9/2024 at 1:00 p.m. via [employer].              Electronically signed by GASTON LANG     Result (most recent):  No results found for this or any previous visit from the past 3650 days.      Admission date (for inpatients): 11/9/2024   * No surgery found *  * No surgery found *        ASSESSMENT/PLAN:  [unfilled]  Principal Problem:    Septic shock ([facility])  Resolved Problems:    * No resolved hospital problems. *     Patient Active Problem List    Diagnosis Date Noted    Acute CVA (cerebrovascular accident) ([facility]) 10/27/2024    Delirium with fluctuating severity 10/19/2024    Septicemia ([facility]) 10/18/2024    Acute encephalopathy

## 2024-11-11 NOTE — WOUND CARE
Sacral wound is 10x 14.6cm with darkened non blanchable skin surrounding, wound base is soft eschar, unstageable pressure injury, the eschar needs debridement, not currently concerned for infection. Recommend finding goals of care and then possible surgical debridement depeninding on goals.Discussed with Dr. Ricketts by perfect serve. Will use Dakin's moist to dry and moist wound healing.      Nodule cyst like,  left mid back ? Limpoma, purple dark top layers of skin, concern if not chronic could be infected. Dr Ricketts informed it is present via perfect serve.

## 2024-11-12 PROCEDURE — 2580000003 HC RX 258: Performed by: FAMILY MEDICINE

## 2024-11-12 PROCEDURE — 6370000000 HC RX 637 (ALT 250 FOR IP): Performed by: HOSPITALIST

## 2024-11-12 PROCEDURE — 2500000003 HC RX 250 WO HCPCS: Performed by: NURSE PRACTITIONER

## 2024-11-12 PROCEDURE — 51798 US URINE CAPACITY MEASURE: CPT

## 2024-11-12 PROCEDURE — 2580000003 HC RX 258: Performed by: HOSPITALIST

## 2024-11-12 PROCEDURE — 6360000002 HC RX W HCPCS: Performed by: HOSPITALIST

## 2024-11-12 PROCEDURE — 51702 INSERT TEMP BLADDER CATH: CPT

## 2024-11-12 PROCEDURE — 1100000000 HC RM PRIVATE

## 2024-11-12 RX ORDER — MORPHINE SULFATE 4 MG/ML
4 INJECTION, SOLUTION INTRAMUSCULAR; INTRAVENOUS
Status: DISCONTINUED | OUTPATIENT
Start: 2024-11-12 | End: 2024-11-13 | Stop reason: HOSPADM

## 2024-11-12 RX ORDER — MORPHINE SULFATE 2 MG/ML
2 INJECTION, SOLUTION INTRAMUSCULAR; INTRAVENOUS
Status: DISCONTINUED | OUTPATIENT
Start: 2024-11-12 | End: 2024-11-13 | Stop reason: HOSPADM

## 2024-11-12 RX ADMIN — SODIUM CHLORIDE 2 MG: 9 INJECTION INTRAMUSCULAR; INTRAVENOUS; SUBCUTANEOUS at 01:40

## 2024-11-12 RX ADMIN — GLYCOPYRROLATE 0.2 MG: 0.2 INJECTION INTRAMUSCULAR; INTRAVENOUS at 01:41

## 2024-11-12 RX ADMIN — METOCLOPRAMIDE 5 MG: 5 INJECTION, SOLUTION INTRAMUSCULAR; INTRAVENOUS at 20:06

## 2024-11-12 RX ADMIN — METOCLOPRAMIDE 5 MG: 5 INJECTION, SOLUTION INTRAMUSCULAR; INTRAVENOUS at 06:46

## 2024-11-12 RX ADMIN — DAKIN'S SOLUTION 0.125% (QUARTER STRENGTH): 0.12 SOLUTION at 10:37

## 2024-11-12 RX ADMIN — MORPHINE SULFATE 2 MG: 2 INJECTION, SOLUTION INTRAMUSCULAR; INTRAVENOUS at 20:07

## 2024-11-12 RX ADMIN — GLYCOPYRROLATE 0.2 MG: 0.2 INJECTION INTRAMUSCULAR; INTRAVENOUS at 15:25

## 2024-11-12 RX ADMIN — METOCLOPRAMIDE 5 MG: 5 INJECTION, SOLUTION INTRAMUSCULAR; INTRAVENOUS at 09:52

## 2024-11-12 RX ADMIN — SODIUM CHLORIDE, PRESERVATIVE FREE 10 ML: 5 INJECTION INTRAVENOUS at 09:52

## 2024-11-12 RX ADMIN — MORPHINE SULFATE 2 MG: 2 INJECTION, SOLUTION INTRAMUSCULAR; INTRAVENOUS at 01:40

## 2024-11-12 RX ADMIN — SODIUM CHLORIDE 2 MG: 9 INJECTION INTRAMUSCULAR; INTRAVENOUS; SUBCUTANEOUS at 12:28

## 2024-11-12 RX ADMIN — METOCLOPRAMIDE 5 MG: 5 INJECTION, SOLUTION INTRAMUSCULAR; INTRAVENOUS at 16:15

## 2024-11-12 RX ADMIN — SODIUM CHLORIDE, PRESERVATIVE FREE 10 ML: 5 INJECTION INTRAVENOUS at 20:06

## 2024-11-12 NOTE — CARE COORDINATION
LEONIE MOTA    Spoke with Jonathan BRINK this am. Pt was at the facility for STR before admission to [facility] on 11/9. Pt is currently on comfort care. Inquiring if facility can take the pt under his Medicaid LTC benefits with Hospice. They are currently reviewing. The are able to take him back for STR if we received auth.    Update 0840: Pt does not have LTC benefits with his medicaid. Exploring options with facility.    Update 1118: Have been attempting to reach daughter Mary to FU on the Hospice discussion. Unable to leave VM as it is not set up.     Update 1414: Call placed to Mary pt's daughter and LNOK. Discussed Hosp[facility]. Mary said she is unable to take pt home with her and the doctor yesterday told her he could stay at this hospital. Explained that we have people stay for a day or so on comfort care but then we need to start looking at either home with hospice or the hospice house. After a lengthy discussion she was agreeable to having the pt go to the hospice house if he is GIP appropriate. Spoke with Vale the [employer] liaision, he will be up to evaluate the pt and discuss with Dr. Bruno for GIP appropriateness.    Update 1630: Pt has been approved for GIP. Vale the liaison has been attempting to get consents for pt to go. Packet made for transport and pt placed on Will Call. Spoke with Dr. Mckeon who stated the covering MD or midlevel can DC pt after 5. Vale made aware.

## 2024-11-13 VITALS
SYSTOLIC BLOOD PRESSURE: 128 MMHG | OXYGEN SATURATION: 97 % | DIASTOLIC BLOOD PRESSURE: 90 MMHG | WEIGHT: 126.76 LBS | HEART RATE: 118 BPM | RESPIRATION RATE: 28 BRPM | BODY MASS INDEX: 19.85 KG/M2 | TEMPERATURE: 98.4 F

## 2024-11-13 PROBLEM — K76.82 ENCEPHALOPATHY, PORTAL SYSTEMIC (HCC): Status: ACTIVE | Noted: 2024-11-06

## 2024-11-13 PROBLEM — K56.609 SMALL BOWEL OBSTRUCTION (HCC): Status: ACTIVE | Noted: 2024-11-13

## 2024-11-13 PROBLEM — K59.00 CONSTIPATION: Status: RESOLVED | Noted: 2024-10-15 | Resolved: 2024-11-13

## 2024-11-13 PROBLEM — R53.81 DEBILITY: Status: ACTIVE | Noted: 2024-11-07

## 2024-11-13 PROBLEM — M84.376A STRESS FRACTURE OF METATARSAL BONE: Status: ACTIVE | Noted: 2021-12-12

## 2024-11-13 PROBLEM — E86.0 DEHYDRATION: Status: RESOLVED | Noted: 2024-10-16 | Resolved: 2024-11-13

## 2024-11-13 PROBLEM — D72.829 LEUKOCYTOSIS: Status: RESOLVED | Noted: 2024-10-15 | Resolved: 2024-11-13

## 2024-11-13 PROBLEM — I12.9 HYPERTENSIVE CHRONIC KIDNEY DISEASE: Status: ACTIVE | Noted: 2022-02-01

## 2024-11-13 PROBLEM — R13.11 ORAL PHASE DYSPHAGIA: Status: ACTIVE | Noted: 2024-11-07

## 2024-11-13 PROCEDURE — 2500000003 HC RX 250 WO HCPCS: Performed by: NURSE PRACTITIONER

## 2024-11-13 PROCEDURE — 6360000002 HC RX W HCPCS: Performed by: HOSPITALIST

## 2024-11-13 PROCEDURE — 2580000003 HC RX 258: Performed by: HOSPITALIST

## 2024-11-13 PROCEDURE — 2700000000 HC OXYGEN THERAPY PER DAY

## 2024-11-13 PROCEDURE — 2580000003 HC RX 258: Performed by: FAMILY MEDICINE

## 2024-11-13 RX ADMIN — METOCLOPRAMIDE 5 MG: 5 INJECTION, SOLUTION INTRAMUSCULAR; INTRAVENOUS at 04:10

## 2024-11-13 RX ADMIN — SODIUM CHLORIDE 2 MG: 9 INJECTION INTRAMUSCULAR; INTRAVENOUS; SUBCUTANEOUS at 04:09

## 2024-11-13 RX ADMIN — MORPHINE SULFATE 2 MG: 2 INJECTION, SOLUTION INTRAMUSCULAR; INTRAVENOUS at 13:06

## 2024-11-13 RX ADMIN — MORPHINE SULFATE 2 MG: 2 INJECTION, SOLUTION INTRAMUSCULAR; INTRAVENOUS at 08:59

## 2024-11-13 RX ADMIN — SODIUM CHLORIDE 2 MG: 9 INJECTION INTRAMUSCULAR; INTRAVENOUS; SUBCUTANEOUS at 09:01

## 2024-11-13 RX ADMIN — SODIUM CHLORIDE, PRESERVATIVE FREE 10 ML: 5 INJECTION INTRAVENOUS at 08:59

## 2024-11-13 RX ADMIN — GLYCOPYRROLATE 0.2 MG: 0.2 INJECTION INTRAMUSCULAR; INTRAVENOUS at 12:11

## 2024-11-13 ASSESSMENT — PAIN SCALES - GENERAL: PAINLEVEL_OUTOF10: 0

## 2024-11-13 NOTE — DISCHARGE SUMMARY
Partially seen ileus bowel gas pattern. Chronic findings. Electronically signed by RALPH BENDER    XR ABDOMEN (KUB) (SINGLE AP VIEW)    Result Date: 11/10/2024  Findings/impression: Enteric tube again noted. The balance of the examination is similar compared to the prior. Electronically signed by Sal Vilchis    XR ABDOMEN (KUB) (SINGLE AP VIEW)    Result Date: 11/9/2024  Findings/impression: Interval placement of an enteric tube of which the proximal port projects over the stomach. Electronically signed by Sal Vilchis    CT CHEST ABDOMEN PELVIS W CONTRAST Additional Contrast? None    Result Date: 11/9/2024  Small bowel obstruction. Surgical consultation recommended. Right greater than left lower lobe infiltrates/consolidations. Correlate clinically for infectious process. Right inguinal bowel-containing hernia Results conveyed to Dr. Bach on 11/9/2024 at 1:00 p.m. via [employer]. Electronically signed by GASTON LANG    XR ABDOMEN (KUB) (SINGLE AP VIEW)    Result Date: 11/9/2024  Contrast noted throughout the colon. Multiple dilated air-filled loops of bowel noted at the abdomen measuring up to 5.7 cm concerning for bowel obstruction. Contrast reaches the rectum suggesting partial bowel obstruction. Recommend CT abdomen and pelvis for further evaluation. Electronically signed by Desi Leslie    XR CHEST PORTABLE    Result Date: 11/9/2024  1. Prominence of the central pulmonary vasculature and diffuse increased interstitial markings, nonspecific. 2. Trace effusion on the right. 3. Normal appearance of the visualized portions of the shoulder joints, recommend dedicated shoulder radiographs when able. 4. Dilated air-filled loops of bowel noted at the upper abdomen with contrast noted within multiple bowel loops at the upper abdomen Electronically signed by Desi Leslie    FL MODIFIED BARIUM SWALLOW W VIDEO    Result Date: 10/31/2024  Mild laryngeal penetration on thin liquid diet with mild

## 2024-11-13 NOTE — PROGRESS NOTES
Hospitalist Progress Note   Admit Date:  2024  9:54 AM   Name:  Rashel Lane   Age:  74 y.o.  Sex:  male  :  1950   MRN:  [id number]   Room:  [facility]    Presenting/Chief Complaint: Shortness of Breath and Hematemesis     Reason(s) for Admission: SBO (small bowel obstruction) ([facility]) [K56.609]  Respiratory distress [R06.03]  Lactic acid acidosis [E87.20]  HAM (acute kidney injury) ([facility]) [N17.9]  Septic shock ([facility]) [A41.9, R65.21]  Pneumonia of both lower lobes due to infectious organism [J18.9]  Hematemesis, unspecified whether nausea present [K92.0]     Hospital Course:   Rashel Lane is a 74 y.o. male with medical history of recent Gi bleed (taken off of DAPT and Xarelto), A.fib, DM-2, HTN, dyslipidemia, CVA, anxiety d/o, CAD recently discharged from [facility] on 24 got readmitted on 24 with sepsis 2/2 aspiration PNA, acute respiratory failure with hypoxia, hematemesis, SBO requiring NG tube placement and HAM with Cr of 1.97 BUN 26. Other significant lab work: Lactic acid of 11.2, PCT 0.80, Alb 2.7. Admitted to icu due to high risk of decompensation.    During previous admission: He received 3 units PRBC. GI consulted and patient underwent upper endoscopy on 10/17/24 which revealed LA grade B esophagitis with with no bleeding, clotted blood in the gastric body, nonbleeding gastric ulcer with visible vessel treated with bipolar cautery. Repeat EGD on 10/29/24 was performed due to continued bleeding on 10/28 which showed antral ulcer.  Patient underwent intervention and hemoglobin has remained stable since.    CT chest abdomen pelvis showed small bowel obstruction.  Surgery consulted and recommend conservative management    Subjective & 24hr Events:   Patient seen and examined at bedside.  Transferred out of ICU, new patient for me.  On comfort focused care only.  Patient calm and sedated at this time.  No acute distress noted.    ROS:  10 point review of system is limited due to patient's 
 attempted to visit patient.  Patient appeared to be sleeping comfortably at time and did not wake to name being called.   provided prayer and is available to follow up.  Peace be with you,  Signed by  VALARIE VernonDiv.   [phone]  
4 Eyes Skin Assessment     NAME:  Rashel Lane  YOB: 1950  MEDICAL RECORD NUMBER:  [id number]    The patient is being assessed for  Admission    I agree that at least one RN has performed a thorough Head to Toe Skin Assessment on the patient. ALL assessment sites listed below have been assessed.      Areas assessed by both nurses:    Head, Face, Ears, Shoulders, Back, Chest, Arms, Elbows, Hands, Sacrum. Buttock, Coccyx, Ischium, Legs. Feet and Heels, and Under Medical Devices         Does the Patient have a Wound? Yes wound(s) were present on assessment. LDA wound assessment was Initiated and completed by RN       Mal Prevention initiated by RN: Yes  Wound Care Orders initiated by RN: Yes    Pressure Injury (Stage 3,4, Unstageable, DTI, NWPT, and Complex wounds) if present, place Wound referral order by RN under : Yes    New Ostomies, if present place, Ostomy referral order under : No     Nurse 1 eSignature: Electronically signed by BETO ODONNELL RN on 11/9/24 at 6:57 PM EST    **SHARE this note so that the co-signing nurse can place an eSignature**    Nurse 2 eSignature: Electronically signed by Jo-Ann Bustos RN on 11/9/24 at 7:00 PM EST   
General Surgery   Brief note  Chart reviewed.  Comfort Care decision by family noted.  Hospice reviewing.  We will sign off.  Call for any surgical concerns.     GIOVANNA HICKEY, SORAYA - CNP    
General Surgery Progress Note    11/10/2024    Admit Date: 11/9/2024    Subjective:   Surgery   The patient was seen in CCU bed number 7. He is arousable, but not able to answer questions appropriately.     Objective:     /70   Pulse 79   Temp 98 °F (36.7 °C) (Oral)   Resp 19   Wt 55.4 kg (122 lb 1.6 oz)   SpO2 100%   BMI 19.12 kg/m²       Intake/Output Summary (Last 24 hours) at 11/10/2024 1225  Last data filed at 11/10/2024 1004  Gross per 24 hour   Intake 2764.35 ml   Output 2050 ml   Net 714.35 ml        EXAM:  ABD soft, not distended, active BS'S. The patient has had two BM'S since admission.   Groin: I could not appreciate a right or a left inguinal hernia on examination.       Data Review    Recent Results (from the past 24 hour(s))   Urinalysis w/Reflex to Micro    Collection Time: 11/09/24 12:39 PM   Result Value Ref Range    Color, UA DARK YELLOW      Appearance CLOUDY      Specific Gravity, UA 1.028 (H) 1.001 - 1.023      pH, Urine 5.5 5.0 - 9.0      Protein, UA 30 (A) NEG mg/dL    Glucose, Ur Negative NEG mg/dL    Ketones, Urine 15 (A) NEG mg/dL    Bilirubin, Urine SMALL (A) NEG      Blood, Urine Negative NEG      Urobilinogen, Urine 1.0 0.2 - 1.0 EU/dL    Nitrite, Urine Negative NEG      Leukocyte Esterase, Urine Negative NEG      WBC, UA 0-3 0 /hpf    RBC, UA 0-3 0 /hpf    Epithelial Cells, UA 3-5 0 /hpf    BACTERIA, URINE 1+ (H) 0 /hpf    Casts GRANULAR 0 /lpf    Amorphous Crystal 2+ (H) 0    Mucus, UA 1+ (H) 0 /lpf   Lactate, Sepsis    Collection Time: 11/09/24  1:34 PM   Result Value Ref Range    Lactic Acid, Sepsis 6.4 (HH) 0.5 - 2.0 MMOL/L   Lactic Acid    Collection Time: 11/09/24  5:26 PM   Result Value Ref Range    Lactic Acid 7.5 (HH) 0.5 - 2.0 mmol/L   CBC with Auto Differential    Collection Time: 11/09/24  6:30 PM   Result Value Ref Range    WBC 8.1 4.3 - 11.1 K/uL    RBC 4.04 (L) 4.23 - 5.6 M/uL    Hemoglobin 12.4 (L) 13.6 - 17.2 g/dL    Hematocrit 41.6 41.1 - 50.3 %    MCV 
I met with pt's daughter at bedside to discuss about pt's on going medical issues, overall prognosis and goals of care.    Daughter is very realistic and understanding about pt's medical issues and overall prognosis.    She has expressed interest in transitioning pt to hospice/comfort care measures in view of recurrent health complications, recent CVA, declining quality of life.    Pt's nurse was at bedside during my conversation with pt's daughter.    All questions answered to best of ability.    Pt is being transitioned to comfort care measures with end of life care orders in place now.    Pt can be transferred out of ICU today.  
Occupational Therapy Note:    Attempted to see patient this PM for occupational therapy evaluation session. Patient is now on comfort care. Will discontinue OT orders at this time.  Thank you,    Tiffany Lacy, OT    Rehab Caseload Tracker      
Physical Therapy Note:    Attempted to see patient this PM for physical therapy treatment  session. Per pt chart, pt now on comfort care, will d/c PT services at this time. Thank you,    Terry Tim, PT     Rehab Caseload Tracker   
Pt is currently on comfort measures. CH attempted to visit PT, but PT appeared to be resting. CH offered comforting spiritual presence and prayer.  CH prayed quietly for PT, PT's Family, and Staff.    Rev. Norma Lane M.Div.    
Pt is resting comfortably in bed, NAD noted. Comfort care measures ongoing.   
Received alert but confused male to room 3307 from ED. CHG bath done and connected to monitoring devices. Noted large sacral wound Allevyn applied. NGT to right nares connected LIS. Applied male external catheter.  
TRANSFER - IN REPORT:    Verbal report received from Sue ESCALANTE on Rashel Lane  being received from ED for routine progression of patient care      Report consisted of patient's Situation, Background, Assessment and   Recommendations(SBAR).     Information from the following report(s) ED Encounter Summary, ED SBAR, Intake/Output, and MAR was reviewed with the receiving nurse.    Opportunity for questions and clarification was provided.      Assessment completed upon patient's arrival to unit and care assumed.    
TRANSFER - OUT REPORT:    Verbal report given to BORIS Harrell on Rashel Lane  being transferred to [facility] for routine progression of patient care       Report consisted of patient's Situation, Background, Assessment and   Recommendations(SBAR).     Information from the following report(s) Nurse Handoff Report, Index, MAR, and Neuro Assessment was reviewed with the receiving nurse.           Lines:       Opportunity for questions and clarification was provided.      Patient transported with:  Tech, O2 at 2 LPM        
TRANSFER - OUT REPORT:    Verbal report given to Hannah ESCALANTE on Rashel Lane  being transferred to [facility] for routine progression of patient care       Report consisted of patient's Situation, Background, Assessment and   Recommendations(SBAR).     Information from the following report(s) Nurse Handoff Report was reviewed with the receiving nurse.           Lines:   Peripheral IV 11/11/24 Left;Upper Arm (Active)   Site Assessment Clean, dry & intact 11/11/24 1600   Line Status Flushed;Normal saline locked;Capped 11/11/24 1600   Line Care Cap changed;Connections checked and tightened 11/11/24 1600   Phlebitis Assessment No symptoms 11/11/24 1600   Infiltration Assessment 0 11/11/24 1600   Alcohol Cap Used Yes 11/11/24 1600   Dressing Status Clean, dry & intact 11/11/24 1600   Dressing Type Transparent 11/11/24 1600   Dressing Intervention New 11/11/24 0945        Opportunity for questions and clarification was provided.      Patient transported with:  Registered Nurse        
error  
FL    nRBC 0.00 0.0 - 0.2 K/uL    Differential Type AUTOMATED      Neutrophils % 75 43 - 78 %    Lymphocytes % 12 (L) 13 - 44 %    Monocytes % 11 4.0 - 12.0 %    Eosinophils % 1 0.5 - 7.8 %    Basophils % 0 0.0 - 2.0 %    Immature Granulocytes % 1 0.0 - 5.0 %    Neutrophils Absolute 5.0 1.7 - 8.2 K/UL    Lymphocytes Absolute 0.8 0.5 - 4.6 K/UL    Monocytes Absolute 0.7 0.1 - 1.3 K/UL    Eosinophils Absolute 0.1 0.0 - 0.8 K/UL    Basophils Absolute 0.0 0.0 - 0.2 K/UL    Immature Granulocytes Absolute 0.0 0.0 - 0.5 K/UL   Lactic Acid    Collection Time: 11/11/24  7:50 AM   Result Value Ref Range    Lactic Acid 3.0 (H) 0.5 - 2.0 mmol/L        Principal Problem:    Septic shock (HCC)  Active Problems:    Anxiety    Diabetic polyneuropathy (HCC)    Coronary artery disease involving native coronary artery of native heart    GERD (gastroesophageal reflux disease)    HTN (hypertension)    Type 2 diabetes with nephropathy ([facility])    Atrial fibrillation (HCC)    Hyperlipidemia    Gastrointestinal hemorrhage    HAM (acute kidney injury) ([facility])    H/O: CVA (cerebrovascular accident)    SBO (small bowel obstruction) ([facility])    Pressure injury of skin of sacral region    Aspiration pneumonia of both lungs ([facility])    Acute respiratory failure with hypoxia  Resolved Problems:    * No resolved hospital problems. *        Plan: 1. No evidence of an incarcerated inguinal hernia. No hernia noted on examination. The patient has had two bowel movements since admission- smears- continue to monitor   2. No surgical intervention necessary    GIOVANNA HICKEY, APRN - CNP    
intervention is medically necessary to address:)  Decreased ADL/Functional Activities  Decreased Activity Tolerance  Decreased AROM/PROM  Decreased Balance  Decreased Cognition  Decreased Coordination  Decreased Gait Ability  Decreased Safety Awareness  Decreased Strength  Decreased Transfer Abilities INTERVENTIONS PLANNED:   (Benefits and precautions of physical therapy have been discussed with the patient.)  Self Care Training  Therapeutic Activity  Therapeutic Exercise/HEP  Gait Training  Education       TREATMENT:   EVALUATION: LOW COMPLEXITY: (Untimed Charge)  The initial evaluation charge encompasses clinical chart review, objective assessment, interpretation of assessment, and skilled monitoring of the patient's response to treatment in order to develop a plan of care.     TREATMENT:   Therapeutic Activity (13 Minutes): Therapeutic activity included Rolling, Supine to Sit, Sit to Supine, Scooting, Transfer Training, and Sitting balance  to improve functional Activity tolerance, Balance, Coordination, Mobility, and Strength.    TREATMENT GRID:  N/A    AFTER TREATMENT PRECAUTIONS: Bed, Bed/Chair Locked, Call light within reach, Needs within reach, and RN at bedside    INTERDISCIPLINARY COLLABORATION:  RN/ PCT    EDUCATION: Education Given To: Patient  Education Provided: Role of Therapy;Plan of Care;Transfer Training;Fall Prevention Strategies  Education Method: Verbal  Barriers to Learning: Cognition  Education Outcome: Continued education needed    TIME IN/OUT:  Time In: 0920  Time Out: 0936  Minutes: 16    Terry Tim PT   
(XOPENEX) nebulization 0.63 mg  0.63 mg Nebulization Q4H While awake    sodium bicarbonate 8.4 % injection 50 mEq  50 mEq IntraVENous Once    dextrose 5 % solution   IntraVENous Continuous    midodrine (PROAMATINE) tablet 5 mg  5 mg Oral TID    sodium chloride 0.9 % bolus 1,000 mL  1,000 mL IntraVENous Once    pantoprazole (PROTONIX) 40 mg in sodium chloride (PF) 0.9 % 10 mL injection  40 mg IntraVENous Q12H    sodium chloride flush 0.9 % injection 5-40 mL  5-40 mL IntraVENous 2 times per day    sodium chloride flush 0.9 % injection 5-40 mL  5-40 mL IntraVENous PRN    0.9 % sodium chloride infusion   IntraVENous PRN    ondansetron (ZOFRAN-ODT) disintegrating tablet 4 mg  4 mg Oral Q8H PRN    Or    ondansetron (ZOFRAN) injection 4 mg  4 mg IntraVENous Q6H PRN    polyethylene glycol (GLYCOLAX) packet 17 g  17 g Oral Daily PRN    acetaminophen (TYLENOL) tablet 650 mg  650 mg Oral Q6H PRN    Or    acetaminophen (TYLENOL) suppository 650 mg  650 mg Rectal Q6H PRN    linezolid (ZYVOX) IVPB 600 mg  600 mg IntraVENous Q12H    cefepime (MAXIPIME) 1,000 mg in sterile water 10 mL IV syringe  1,000 mg IntraVENous Q12H    metroNIDAZOLE (FLAGYL) 500 mg in 0.9% NaCl 100 mL IVPB premix  500 mg IntraVENous Q8H       Signed:  Keshawn Ricketts MD    Part of this note may have been written by using a voice dictation software.  The note has been proof read but may still contain some grammatical/other typographical errors.  
Range    Lactic Acid 2.5 (H) 0.5 - 2.0 mmol/L   Lactic Acid    Collection Time: 11/10/24  6:44 PM   Result Value Ref Range    Lactic Acid 3.2 (H) 0.5 - 2.0 mmol/L   Lactic Acid    Collection Time: 11/11/24 12:43 AM   Result Value Ref Range    Lactic Acid 3.1 (H) 0.5 - 2.0 mmol/L   Basic Metabolic Panel    Collection Time: 11/11/24 12:45 AM   Result Value Ref Range    Sodium 140 136 - 145 mmol/L    Potassium 3.6 3.5 - 5.1 mmol/L    Chloride 107 98 - 107 mmol/L    CO2 22 20 - 29 mmol/L    Anion Gap 10 7 - 16 mmol/L    Glucose 128 (H) 70 - 99 mg/dL    BUN 21 8 - 23 MG/DL    Creatinine 1.35 (H) 0.80 - 1.30 MG/DL    Est, Glom Filt Rate 55 (L) >60 ml/min/1.73m2    Calcium 8.0 (L) 8.8 - 10.2 MG/DL   CBC with Auto Differential    Collection Time: 11/11/24 12:45 AM   Result Value Ref Range    WBC 6.6 4.3 - 11.1 K/uL    RBC 3.37 (L) 4.23 - 5.6 M/uL    Hemoglobin 10.2 (L) 13.6 - 17.2 g/dL    Hematocrit 32.8 (L) 41.1 - 50.3 %    MCV 97.3 82 - 102 FL    MCH 30.3 26.1 - 32.9 PG    MCHC 31.1 (L) 31.4 - 35.0 g/dL    RDW 18.4 (H) 11.9 - 14.6 %    Platelets 174 150 - 450 K/uL    MPV 9.6 9.4 - 12.3 FL    nRBC 0.00 0.0 - 0.2 K/uL    Differential Type AUTOMATED      Neutrophils % 75 43 - 78 %    Lymphocytes % 12 (L) 13 - 44 %    Monocytes % 11 4.0 - 12.0 %    Eosinophils % 1 0.5 - 7.8 %    Basophils % 0 0.0 - 2.0 %    Immature Granulocytes % 1 0.0 - 5.0 %    Neutrophils Absolute 5.0 1.7 - 8.2 K/UL    Lymphocytes Absolute 0.8 0.5 - 4.6 K/UL    Monocytes Absolute 0.7 0.1 - 1.3 K/UL    Eosinophils Absolute 0.1 0.0 - 0.8 K/UL    Basophils Absolute 0.0 0.0 - 0.2 K/UL    Immature Granulocytes Absolute 0.0 0.0 - 0.5 K/UL       No results for input(s): \"COVID19\" in the last 72 hours.    Current Meds:  Current Facility-Administered Medications   Medication Dose Route Frequency    sodium chloride 0.9 % bolus 1,000 mL  1,000 mL IntraVENous Once    dextrose 5 % solution   IntraVENous Continuous    levalbuterol (XOPENEX) nebulization 0.63 mg  0.63

## 2024-11-13 NOTE — CARE COORDINATION
LEONIE STORY    Spoke with Mikael Collazo liaison, he will be meeting with Mary, the pt's daughter, to sign consents for the [facility] at 10 am.     Update 1225: Consents signed and DNR for transport signed by Mary and Dr. Noguera. Pt will discharge to the [facility] for continuation of care. He will transport via [facility] at 2pm. Mary aware of transport time. Transport packet done. No other needs identified at this time and Mary is in agreement with this discharge plan.       11/13/24 1221   Services At/After Discharge   Transition of Care Consult (CM Consult) [facility];Discharge Planning;Transportation Assistance   Services At/After Discharge Hospice   [place] Resource Information Provided? No   Mode of Transport at Discharge Self   Hospital Transport Time of Discharge 1400   Confirm Follow Up Transport Family   Condition of Participation: Discharge Planning   The Plan for Transition of Care is related to the following treatment goals: Pt will transition to the [facility]   The Patient and/or Patient Representative was provided with a Choice of Provider? Patient Representative   Name of the Patient Representative who was provided with the Choice of Provider and agrees with the Discharge Plan?  Mary Aguilar   The Patient and/Or Patient Representative agree with the Discharge Plan? Yes   Freedom of Choice list was provided with basic dialogue that supports the patient's individualized plan of care/goals, treatment preferences, and shares the quality data associated with the providers?  Yes

## 2024-11-14 LAB
BACTERIA SPEC CULT: NORMAL
BACTERIA SPEC CULT: NORMAL
SERVICE CMNT-IMP: NORMAL
SERVICE CMNT-IMP: NORMAL

## (undated) DEVICE — CONNECTOR TBNG OD5-7MM O2 END DISP

## (undated) DEVICE — HIGH PERFORMANCE GUIDEWIRE: Brand: HYDRA JAGWIRE

## (undated) DEVICE — ENDOSCOPIC KIT 1.1+ OP4 CA DE 2 GWN AAMI LEVEL 3

## (undated) DEVICE — BLOCK BITE AD 60FR W/ VELC STRP ADDRESSES MOST PT AND

## (undated) DEVICE — AIRLIFE™ OXYGEN TUBING 7 FEET (2.1 M) CRUSH RESISTANT OXYGEN TUBING, VINYL TIPPED: Brand: AIRLIFE™

## (undated) DEVICE — CORFLO* NASOGASTRIC/NASOINTESTINAL FEEDING TUBE WITH STYLET WITH ANTI-IV* CONNECTOR: Brand: AVANOS

## (undated) DEVICE — CANNULA NSL ORAL AD FOR CAPNOFLEX CO2 O2 AIRLFE

## (undated) DEVICE — CATHETER ELECHEMSTAS 10FR L300CM WRK CHN 3.7MM STD PLUG

## (undated) DEVICE — MOUTHPIECE ENDOSCP L CTRL OPN AND SIDE PORTS DISP

## (undated) DEVICE — KENDALL RADIOLUCENT FOAM MONITORING ELECTRODE RECTANGULAR SHAPE: Brand: KENDALL

## (undated) DEVICE — SINGLE PORT MANIFOLD: Brand: NEPTUNE 2

## (undated) DEVICE — FEEDING TUBE: Brand: KANGAROO

## (undated) DEVICE — ENDOSCOPIC ULTRASOUND FINE NEEDLE BIOPSY (FNB) DEVICE: Brand: ACQUIRE

## (undated) DEVICE — GAUZE,SPONGE,4"X4",12PLY,WOVEN,NS,LF: Brand: MEDLINE